# Patient Record
Sex: MALE | Race: WHITE | NOT HISPANIC OR LATINO | Employment: OTHER | URBAN - METROPOLITAN AREA
[De-identification: names, ages, dates, MRNs, and addresses within clinical notes are randomized per-mention and may not be internally consistent; named-entity substitution may affect disease eponyms.]

---

## 2023-03-06 LAB
ALANINE AMINOTRANSFERASE (SGPT) (U/L) IN SER/PLAS: 19 U/L (ref 10–52)
ALBUMIN (G/DL) IN SER/PLAS: 4.1 G/DL (ref 3.4–5)
ALKALINE PHOSPHATASE (U/L) IN SER/PLAS: 76 U/L (ref 33–136)
ANION GAP IN SER/PLAS: 12 MMOL/L (ref 10–20)
ASPARTATE AMINOTRANSFERASE (SGOT) (U/L) IN SER/PLAS: 19 U/L (ref 9–39)
BASOPHILS (10*3/UL) IN BLOOD BY AUTOMATED COUNT: 0.06 X10E9/L (ref 0–0.1)
BASOPHILS/100 LEUKOCYTES IN BLOOD BY AUTOMATED COUNT: 1.4 % (ref 0–2)
BILIRUBIN TOTAL (MG/DL) IN SER/PLAS: 0.8 MG/DL (ref 0–1.2)
CALCIUM (MG/DL) IN SER/PLAS: 9.4 MG/DL (ref 8.6–10.3)
CARBON DIOXIDE, TOTAL (MMOL/L) IN SER/PLAS: 27 MMOL/L (ref 21–32)
CHLORIDE (MMOL/L) IN SER/PLAS: 104 MMOL/L (ref 98–107)
CREATININE (MG/DL) IN SER/PLAS: 0.81 MG/DL (ref 0.5–1.3)
EOSINOPHILS (10*3/UL) IN BLOOD BY AUTOMATED COUNT: 0.28 X10E9/L (ref 0–0.7)
EOSINOPHILS/100 LEUKOCYTES IN BLOOD BY AUTOMATED COUNT: 6.7 % (ref 0–6)
ERYTHROCYTE DISTRIBUTION WIDTH (RATIO) BY AUTOMATED COUNT: 11.9 % (ref 11.5–14.5)
ERYTHROCYTE MEAN CORPUSCULAR HEMOGLOBIN CONCENTRATION (G/DL) BY AUTOMATED: 33.3 G/DL (ref 32–36)
ERYTHROCYTE MEAN CORPUSCULAR VOLUME (FL) BY AUTOMATED COUNT: 90 FL (ref 80–100)
ERYTHROCYTES (10*6/UL) IN BLOOD BY AUTOMATED COUNT: 4.71 X10E12/L (ref 4.5–5.9)
GFR MALE: >90 ML/MIN/1.73M2
GLUCOSE (MG/DL) IN SER/PLAS: 93 MG/DL (ref 74–99)
HEMATOCRIT (%) IN BLOOD BY AUTOMATED COUNT: 42.4 % (ref 41–52)
HEMOGLOBIN (G/DL) IN BLOOD: 14.1 G/DL (ref 13.5–17.5)
IMMATURE GRANULOCYTES/100 LEUKOCYTES IN BLOOD BY AUTOMATED COUNT: 0 % (ref 0–0.9)
LEUKOCYTES (10*3/UL) IN BLOOD BY AUTOMATED COUNT: 4.2 X10E9/L (ref 4.4–11.3)
LYMPHOCYTES (10*3/UL) IN BLOOD BY AUTOMATED COUNT: 0.87 X10E9/L (ref 1.2–4.8)
LYMPHOCYTES/100 LEUKOCYTES IN BLOOD BY AUTOMATED COUNT: 20.9 % (ref 13–44)
MONOCYTES (10*3/UL) IN BLOOD BY AUTOMATED COUNT: 0.5 X10E9/L (ref 0.1–1)
MONOCYTES/100 LEUKOCYTES IN BLOOD BY AUTOMATED COUNT: 12 % (ref 2–10)
NEUTROPHILS (10*3/UL) IN BLOOD BY AUTOMATED COUNT: 2.46 X10E9/L (ref 1.2–7.7)
NEUTROPHILS/100 LEUKOCYTES IN BLOOD BY AUTOMATED COUNT: 59 % (ref 40–80)
PLATELETS (10*3/UL) IN BLOOD AUTOMATED COUNT: 336 X10E9/L (ref 150–450)
POTASSIUM (MMOL/L) IN SER/PLAS: 3.2 MMOL/L (ref 3.5–5.3)
PROSTATE SPECIFIC AG (NG/ML) IN SER/PLAS: <0.01 NG/ML (ref 0–4)
PROTEIN TOTAL: 7.2 G/DL (ref 6.4–8.2)
SODIUM (MMOL/L) IN SER/PLAS: 140 MMOL/L (ref 136–145)
TESTOSTERONE (NG/DL) IN SER/PLAS: <60 NG/DL (ref 240–1000)
UREA NITROGEN (MG/DL) IN SER/PLAS: 15 MG/DL (ref 6–23)

## 2023-05-31 LAB
ALANINE AMINOTRANSFERASE (SGPT) (U/L) IN SER/PLAS: 20 U/L (ref 10–52)
ALBUMIN (G/DL) IN SER/PLAS: 4.4 G/DL (ref 3.4–5)
ALKALINE PHOSPHATASE (U/L) IN SER/PLAS: 86 U/L (ref 33–136)
ANION GAP IN SER/PLAS: 13 MMOL/L (ref 10–20)
ASPARTATE AMINOTRANSFERASE (SGOT) (U/L) IN SER/PLAS: 20 U/L (ref 9–39)
BASOPHILS (10*3/UL) IN BLOOD BY AUTOMATED COUNT: 0.11 X10E9/L (ref 0–0.1)
BASOPHILS/100 LEUKOCYTES IN BLOOD BY AUTOMATED COUNT: 2.1 % (ref 0–2)
BILIRUBIN TOTAL (MG/DL) IN SER/PLAS: 0.6 MG/DL (ref 0–1.2)
CALCIUM (MG/DL) IN SER/PLAS: 9.4 MG/DL (ref 8.6–10.3)
CARBON DIOXIDE, TOTAL (MMOL/L) IN SER/PLAS: 26 MMOL/L (ref 21–32)
CHLORIDE (MMOL/L) IN SER/PLAS: 106 MMOL/L (ref 98–107)
CREATININE (MG/DL) IN SER/PLAS: 0.74 MG/DL (ref 0.5–1.3)
EOSINOPHILS (10*3/UL) IN BLOOD BY AUTOMATED COUNT: 0.42 X10E9/L (ref 0–0.7)
EOSINOPHILS/100 LEUKOCYTES IN BLOOD BY AUTOMATED COUNT: 7.9 % (ref 0–6)
ERYTHROCYTE DISTRIBUTION WIDTH (RATIO) BY AUTOMATED COUNT: 12.3 % (ref 11.5–14.5)
ERYTHROCYTE MEAN CORPUSCULAR HEMOGLOBIN CONCENTRATION (G/DL) BY AUTOMATED: 33.4 G/DL (ref 32–36)
ERYTHROCYTE MEAN CORPUSCULAR VOLUME (FL) BY AUTOMATED COUNT: 90 FL (ref 80–100)
ERYTHROCYTES (10*6/UL) IN BLOOD BY AUTOMATED COUNT: 5 X10E12/L (ref 4.5–5.9)
GFR MALE: >90 ML/MIN/1.73M2
GLUCOSE (MG/DL) IN SER/PLAS: 99 MG/DL (ref 74–99)
HEMATOCRIT (%) IN BLOOD BY AUTOMATED COUNT: 44.9 % (ref 41–52)
HEMOGLOBIN (G/DL) IN BLOOD: 15 G/DL (ref 13.5–17.5)
IMMATURE GRANULOCYTES/100 LEUKOCYTES IN BLOOD BY AUTOMATED COUNT: 0.2 % (ref 0–0.9)
LEUKOCYTES (10*3/UL) IN BLOOD BY AUTOMATED COUNT: 5.3 X10E9/L (ref 4.4–11.3)
LYMPHOCYTES (10*3/UL) IN BLOOD BY AUTOMATED COUNT: 0.92 X10E9/L (ref 1.2–4.8)
LYMPHOCYTES/100 LEUKOCYTES IN BLOOD BY AUTOMATED COUNT: 17.4 % (ref 13–44)
MONOCYTES (10*3/UL) IN BLOOD BY AUTOMATED COUNT: 0.6 X10E9/L (ref 0.1–1)
MONOCYTES/100 LEUKOCYTES IN BLOOD BY AUTOMATED COUNT: 11.3 % (ref 2–10)
NEUTROPHILS (10*3/UL) IN BLOOD BY AUTOMATED COUNT: 3.24 X10E9/L (ref 1.2–7.7)
NEUTROPHILS/100 LEUKOCYTES IN BLOOD BY AUTOMATED COUNT: 61.1 % (ref 40–80)
PLATELETS (10*3/UL) IN BLOOD AUTOMATED COUNT: 349 X10E9/L (ref 150–450)
POTASSIUM (MMOL/L) IN SER/PLAS: 3.6 MMOL/L (ref 3.5–5.3)
PROSTATE SPECIFIC AG (NG/ML) IN SER/PLAS: <0.01 NG/ML (ref 0–4)
PROTEIN TOTAL: 7.8 G/DL (ref 6.4–8.2)
SODIUM (MMOL/L) IN SER/PLAS: 141 MMOL/L (ref 136–145)
TESTOSTERONE (NG/DL) IN SER/PLAS: <60 NG/DL (ref 240–1000)
UREA NITROGEN (MG/DL) IN SER/PLAS: 18 MG/DL (ref 6–23)

## 2023-07-20 DIAGNOSIS — I10 PRIMARY HYPERTENSION: Primary | ICD-10-CM

## 2023-07-20 RX ORDER — ABIRATERONE ACETATE 250 MG/1
4 TABLET ORAL DAILY
COMMUNITY
Start: 2022-10-11 | End: 2023-11-30 | Stop reason: ALTCHOICE

## 2023-07-20 RX ORDER — LOSARTAN POTASSIUM 50 MG/1
50 TABLET ORAL 2 TIMES DAILY
Qty: 180 TABLET | Refills: 3 | Status: SHIPPED | OUTPATIENT
Start: 2023-07-20 | End: 2023-07-20

## 2023-07-20 RX ORDER — PREDNISONE 5 MG/1
TABLET ORAL
COMMUNITY
Start: 2022-10-11 | End: 2023-11-13 | Stop reason: WASHOUT

## 2023-07-20 RX ORDER — AMLODIPINE BESYLATE 5 MG/1
2 TABLET ORAL DAILY
COMMUNITY
Start: 2022-11-23 | End: 2023-11-30 | Stop reason: ALTCHOICE

## 2023-07-20 RX ORDER — LOSARTAN POTASSIUM 50 MG/1
50 TABLET ORAL 2 TIMES DAILY
COMMUNITY
End: 2023-07-20 | Stop reason: SDUPTHER

## 2023-08-24 LAB
ALANINE AMINOTRANSFERASE (SGPT) (U/L) IN SER/PLAS: 14 U/L (ref 10–52)
ALBUMIN (G/DL) IN SER/PLAS: 4.3 G/DL (ref 3.4–5)
ALKALINE PHOSPHATASE (U/L) IN SER/PLAS: 91 U/L (ref 33–136)
ANION GAP IN SER/PLAS: 12 MMOL/L (ref 10–20)
ASPARTATE AMINOTRANSFERASE (SGOT) (U/L) IN SER/PLAS: 16 U/L (ref 9–39)
BASOPHILS (10*3/UL) IN BLOOD BY AUTOMATED COUNT: 0.08 X10E9/L (ref 0–0.1)
BASOPHILS/100 LEUKOCYTES IN BLOOD BY AUTOMATED COUNT: 1.4 % (ref 0–2)
BILIRUBIN TOTAL (MG/DL) IN SER/PLAS: 0.4 MG/DL (ref 0–1.2)
CALCIUM (MG/DL) IN SER/PLAS: 9.7 MG/DL (ref 8.6–10.3)
CARBON DIOXIDE, TOTAL (MMOL/L) IN SER/PLAS: 26 MMOL/L (ref 21–32)
CHLORIDE (MMOL/L) IN SER/PLAS: 105 MMOL/L (ref 98–107)
CREATININE (MG/DL) IN SER/PLAS: 0.78 MG/DL (ref 0.5–1.3)
EOSINOPHILS (10*3/UL) IN BLOOD BY AUTOMATED COUNT: 0.6 X10E9/L (ref 0–0.7)
EOSINOPHILS/100 LEUKOCYTES IN BLOOD BY AUTOMATED COUNT: 10.7 % (ref 0–6)
ERYTHROCYTE DISTRIBUTION WIDTH (RATIO) BY AUTOMATED COUNT: 12.3 % (ref 11.5–14.5)
ERYTHROCYTE MEAN CORPUSCULAR HEMOGLOBIN CONCENTRATION (G/DL) BY AUTOMATED: 33.6 G/DL (ref 32–36)
ERYTHROCYTE MEAN CORPUSCULAR VOLUME (FL) BY AUTOMATED COUNT: 90 FL (ref 80–100)
ERYTHROCYTES (10*6/UL) IN BLOOD BY AUTOMATED COUNT: 4.76 X10E12/L (ref 4.5–5.9)
GFR MALE: >90 ML/MIN/1.73M2
GLUCOSE (MG/DL) IN SER/PLAS: 104 MG/DL (ref 74–99)
HEMATOCRIT (%) IN BLOOD BY AUTOMATED COUNT: 42.8 % (ref 41–52)
HEMOGLOBIN (G/DL) IN BLOOD: 14.4 G/DL (ref 13.5–17.5)
IMMATURE GRANULOCYTES/100 LEUKOCYTES IN BLOOD BY AUTOMATED COUNT: 0.2 % (ref 0–0.9)
LEUKOCYTES (10*3/UL) IN BLOOD BY AUTOMATED COUNT: 5.6 X10E9/L (ref 4.4–11.3)
LYMPHOCYTES (10*3/UL) IN BLOOD BY AUTOMATED COUNT: 1.05 X10E9/L (ref 1.2–4.8)
LYMPHOCYTES/100 LEUKOCYTES IN BLOOD BY AUTOMATED COUNT: 18.7 % (ref 13–44)
MONOCYTES (10*3/UL) IN BLOOD BY AUTOMATED COUNT: 0.49 X10E9/L (ref 0.1–1)
MONOCYTES/100 LEUKOCYTES IN BLOOD BY AUTOMATED COUNT: 8.7 % (ref 2–10)
NEUTROPHILS (10*3/UL) IN BLOOD BY AUTOMATED COUNT: 3.39 X10E9/L (ref 1.2–7.7)
NEUTROPHILS/100 LEUKOCYTES IN BLOOD BY AUTOMATED COUNT: 60.3 % (ref 40–80)
PLATELETS (10*3/UL) IN BLOOD AUTOMATED COUNT: 306 X10E9/L (ref 150–450)
POTASSIUM (MMOL/L) IN SER/PLAS: 3.8 MMOL/L (ref 3.5–5.3)
PROSTATE SPECIFIC AG (NG/ML) IN SER/PLAS: <0.1 NG/ML (ref 0–4)
PROTEIN TOTAL: 7.3 G/DL (ref 6.4–8.2)
SODIUM (MMOL/L) IN SER/PLAS: 139 MMOL/L (ref 136–145)
TESTOSTERONE (NG/DL) IN SER/PLAS: <60 NG/DL (ref 240–1000)
UREA NITROGEN (MG/DL) IN SER/PLAS: 18 MG/DL (ref 6–23)

## 2023-10-10 ENCOUNTER — SPECIALTY PHARMACY (OUTPATIENT)
Dept: PHARMACY | Facility: CLINIC | Age: 63
End: 2023-10-10

## 2023-10-10 ENCOUNTER — PHARMACY VISIT (OUTPATIENT)
Dept: PHARMACY | Facility: CLINIC | Age: 63
End: 2023-10-10
Payer: COMMERCIAL

## 2023-10-10 PROCEDURE — RXMED WILLOW AMBULATORY MEDICATION CHARGE

## 2023-10-19 ENCOUNTER — PHARMACY VISIT (OUTPATIENT)
Dept: PHARMACY | Facility: CLINIC | Age: 63
End: 2023-10-19
Payer: COMMERCIAL

## 2023-10-19 PROCEDURE — RXMED WILLOW AMBULATORY MEDICATION CHARGE

## 2023-10-30 PROBLEM — N40.0 BPH WITHOUT URINARY OBSTRUCTION: Status: ACTIVE | Noted: 2023-10-30

## 2023-10-30 PROBLEM — M25.551 BILATERAL HIP PAIN: Status: ACTIVE | Noted: 2023-10-30

## 2023-10-30 PROBLEM — G56.20 CUBITAL TUNNEL SYNDROME: Status: ACTIVE | Noted: 2023-10-30

## 2023-10-30 PROBLEM — N52.9 ERECTILE DYSFUNCTION: Status: ACTIVE | Noted: 2023-10-30

## 2023-10-30 PROBLEM — A60.00 HERPES, GENITAL: Status: ACTIVE | Noted: 2023-10-30

## 2023-10-30 PROBLEM — C61 PROSTATE CA (MULTI): Status: ACTIVE | Noted: 2023-10-30

## 2023-10-30 PROBLEM — R68.82 LOW LIBIDO: Status: ACTIVE | Noted: 2023-10-30

## 2023-10-30 PROBLEM — R35.1 NOCTURIA: Status: ACTIVE | Noted: 2023-10-30

## 2023-10-30 PROBLEM — G56.03 CARPAL TUNNEL SYNDROME, BILATERAL: Status: ACTIVE | Noted: 2023-10-30

## 2023-10-30 PROBLEM — L98.9 SKIN LESION: Status: ACTIVE | Noted: 2023-10-30

## 2023-10-30 PROBLEM — N50.89 SWELLING OF SCROTUM PRESENT ON EXAMINATION: Status: ACTIVE | Noted: 2023-10-30

## 2023-10-30 PROBLEM — I10 HTN (HYPERTENSION): Status: ACTIVE | Noted: 2023-10-30

## 2023-10-30 PROBLEM — M25.552 BILATERAL HIP PAIN: Status: ACTIVE | Noted: 2023-10-30

## 2023-10-30 PROBLEM — M19.012 GLENOHUMERAL ARTHRITIS, LEFT: Status: ACTIVE | Noted: 2023-10-30

## 2023-10-30 PROBLEM — E29.1 HYPOGONADISM IN MALE: Status: ACTIVE | Noted: 2023-10-30

## 2023-10-30 PROBLEM — R30.0 DYSURIA: Status: ACTIVE | Noted: 2023-10-30

## 2023-11-09 ENCOUNTER — APPOINTMENT (OUTPATIENT)
Dept: HEMATOLOGY/ONCOLOGY | Facility: CLINIC | Age: 63
End: 2023-11-09
Payer: COMMERCIAL

## 2023-11-13 ENCOUNTER — PHARMACY VISIT (OUTPATIENT)
Dept: PHARMACY | Facility: CLINIC | Age: 63
End: 2023-11-13
Payer: COMMERCIAL

## 2023-11-13 ENCOUNTER — SPECIALTY PHARMACY (OUTPATIENT)
Dept: PHARMACY | Facility: CLINIC | Age: 63
End: 2023-11-13

## 2023-11-13 DIAGNOSIS — C61 PROSTATE CA (MULTI): Primary | ICD-10-CM

## 2023-11-13 PROCEDURE — RXMED WILLOW AMBULATORY MEDICATION CHARGE

## 2023-11-13 RX ORDER — PREDNISONE 5 MG/1
TABLET ORAL
Qty: 30 TABLET | Refills: 11 | Status: SHIPPED | OUTPATIENT
Start: 2023-11-13 | End: 2024-11-11

## 2023-11-14 ENCOUNTER — PHARMACY VISIT (OUTPATIENT)
Dept: PHARMACY | Facility: CLINIC | Age: 63
End: 2023-11-14
Payer: COMMERCIAL

## 2023-11-14 PROCEDURE — RXMED WILLOW AMBULATORY MEDICATION CHARGE

## 2023-11-27 ENCOUNTER — PHARMACY VISIT (OUTPATIENT)
Dept: PHARMACY | Facility: CLINIC | Age: 63
End: 2023-11-27
Payer: COMMERCIAL

## 2023-11-27 ENCOUNTER — LAB (OUTPATIENT)
Dept: LAB | Facility: LAB | Age: 63
End: 2023-11-27
Payer: COMMERCIAL

## 2023-11-27 DIAGNOSIS — C61 MALIGNANT NEOPLASM OF PROSTATE (MULTI): Primary | ICD-10-CM

## 2023-11-27 LAB
ALBUMIN SERPL BCP-MCNC: 4.3 G/DL (ref 3.4–5)
ALP SERPL-CCNC: 89 U/L (ref 33–136)
ALT SERPL W P-5'-P-CCNC: 17 U/L (ref 10–52)
ANION GAP SERPL CALC-SCNC: 12 MMOL/L (ref 10–20)
AST SERPL W P-5'-P-CCNC: 17 U/L (ref 9–39)
BASOPHILS # BLD AUTO: 0.1 X10*3/UL (ref 0–0.1)
BASOPHILS NFR BLD AUTO: 1.7 %
BILIRUB SERPL-MCNC: 0.7 MG/DL (ref 0–1.2)
BUN SERPL-MCNC: 16 MG/DL (ref 6–23)
CALCIUM SERPL-MCNC: 9.4 MG/DL (ref 8.6–10.3)
CHLORIDE SERPL-SCNC: 103 MMOL/L (ref 98–107)
CO2 SERPL-SCNC: 28 MMOL/L (ref 21–32)
CREAT SERPL-MCNC: 0.75 MG/DL (ref 0.5–1.3)
EOSINOPHIL # BLD AUTO: 0.46 X10*3/UL (ref 0–0.7)
EOSINOPHIL NFR BLD AUTO: 7.9 %
ERYTHROCYTE [DISTWIDTH] IN BLOOD BY AUTOMATED COUNT: 12.3 % (ref 11.5–14.5)
GFR SERPL CREATININE-BSD FRML MDRD: >90 ML/MIN/1.73M*2
GLUCOSE SERPL-MCNC: 87 MG/DL (ref 74–99)
HCT VFR BLD AUTO: 43.7 % (ref 41–52)
HGB BLD-MCNC: 14.7 G/DL (ref 13.5–17.5)
IMM GRANULOCYTES # BLD AUTO: 0.01 X10*3/UL (ref 0–0.7)
IMM GRANULOCYTES NFR BLD AUTO: 0.2 % (ref 0–0.9)
LYMPHOCYTES # BLD AUTO: 1.14 X10*3/UL (ref 1.2–4.8)
LYMPHOCYTES NFR BLD AUTO: 19.7 %
MCH RBC QN AUTO: 30.6 PG (ref 26–34)
MCHC RBC AUTO-ENTMCNC: 33.6 G/DL (ref 32–36)
MCV RBC AUTO: 91 FL (ref 80–100)
MONOCYTES # BLD AUTO: 0.66 X10*3/UL (ref 0.1–1)
MONOCYTES NFR BLD AUTO: 11.4 %
NEUTROPHILS # BLD AUTO: 3.42 X10*3/UL (ref 1.2–7.7)
NEUTROPHILS NFR BLD AUTO: 59.1 %
NRBC BLD-RTO: 0 /100 WBCS (ref 0–0)
PLATELET # BLD AUTO: 346 X10*3/UL (ref 150–450)
POTASSIUM SERPL-SCNC: 4 MMOL/L (ref 3.5–5.3)
PROT SERPL-MCNC: 7.2 G/DL (ref 6.4–8.2)
PSA SERPL-MCNC: <0.01 NG/ML
RBC # BLD AUTO: 4.81 X10*6/UL (ref 4.5–5.9)
SODIUM SERPL-SCNC: 139 MMOL/L (ref 136–145)
TESTOST SERPL-MCNC: <30 NG/DL (ref 240–1000)
WBC # BLD AUTO: 5.8 X10*3/UL (ref 4.4–11.3)

## 2023-11-27 PROCEDURE — 80053 COMPREHEN METABOLIC PANEL: CPT

## 2023-11-27 PROCEDURE — 84403 ASSAY OF TOTAL TESTOSTERONE: CPT

## 2023-11-27 PROCEDURE — 36415 COLL VENOUS BLD VENIPUNCTURE: CPT

## 2023-11-27 PROCEDURE — RXMED WILLOW AMBULATORY MEDICATION CHARGE

## 2023-11-27 PROCEDURE — 84153 ASSAY OF PSA TOTAL: CPT

## 2023-11-27 PROCEDURE — 85025 COMPLETE CBC W/AUTO DIFF WBC: CPT

## 2023-11-30 ENCOUNTER — OFFICE VISIT (OUTPATIENT)
Dept: HEMATOLOGY/ONCOLOGY | Facility: CLINIC | Age: 63
End: 2023-11-30
Payer: COMMERCIAL

## 2023-11-30 ENCOUNTER — INFUSION (OUTPATIENT)
Dept: HEMATOLOGY/ONCOLOGY | Facility: CLINIC | Age: 63
End: 2023-11-30
Payer: COMMERCIAL

## 2023-11-30 ENCOUNTER — PHARMACY VISIT (OUTPATIENT)
Dept: PHARMACY | Facility: CLINIC | Age: 63
End: 2023-11-30
Payer: COMMERCIAL

## 2023-11-30 ENCOUNTER — APPOINTMENT (OUTPATIENT)
Dept: HEMATOLOGY/ONCOLOGY | Facility: CLINIC | Age: 63
End: 2023-11-30
Payer: COMMERCIAL

## 2023-11-30 VITALS
DIASTOLIC BLOOD PRESSURE: 70 MMHG | TEMPERATURE: 96.4 F | RESPIRATION RATE: 16 BRPM | BODY MASS INDEX: 32.1 KG/M2 | HEART RATE: 80 BPM | HEIGHT: 70 IN | SYSTOLIC BLOOD PRESSURE: 98 MMHG | WEIGHT: 224.21 LBS | OXYGEN SATURATION: 95 %

## 2023-11-30 DIAGNOSIS — C61 PROSTATE CA (MULTI): ICD-10-CM

## 2023-11-30 DIAGNOSIS — C61 PROSTATE CA (MULTI): Primary | ICD-10-CM

## 2023-11-30 PROCEDURE — 3078F DIAST BP <80 MM HG: CPT | Performed by: NURSE PRACTITIONER

## 2023-11-30 PROCEDURE — 96402 CHEMO HORMON ANTINEOPL SQ/IM: CPT

## 2023-11-30 PROCEDURE — 99215 OFFICE O/P EST HI 40 MIN: CPT | Performed by: NURSE PRACTITIONER

## 2023-11-30 PROCEDURE — RXMED WILLOW AMBULATORY MEDICATION CHARGE

## 2023-11-30 PROCEDURE — 3074F SYST BP LT 130 MM HG: CPT | Performed by: NURSE PRACTITIONER

## 2023-11-30 PROCEDURE — 2500000004 HC RX 250 GENERAL PHARMACY W/ HCPCS (ALT 636 FOR OP/ED): Mod: JZ | Performed by: NURSE PRACTITIONER

## 2023-11-30 RX ORDER — OXYBUTYNIN CHLORIDE 5 MG/1
5 TABLET ORAL 2 TIMES DAILY
Qty: 60 TABLET | Refills: 2 | Status: SHIPPED | OUTPATIENT
Start: 2023-11-30 | End: 2024-04-02 | Stop reason: SDUPTHER

## 2023-11-30 RX ORDER — EPINEPHRINE 0.3 MG/.3ML
0.3 INJECTION SUBCUTANEOUS EVERY 5 MIN PRN
Status: CANCELLED | OUTPATIENT
Start: 2023-11-30

## 2023-11-30 RX ORDER — DIPHENHYDRAMINE HYDROCHLORIDE 50 MG/ML
50 INJECTION INTRAMUSCULAR; INTRAVENOUS AS NEEDED
Status: CANCELLED | OUTPATIENT
Start: 2023-11-30

## 2023-11-30 RX ORDER — FAMOTIDINE 10 MG/ML
20 INJECTION INTRAVENOUS ONCE AS NEEDED
Status: CANCELLED | OUTPATIENT
Start: 2023-11-30

## 2023-11-30 RX ORDER — ALBUTEROL SULFATE 0.83 MG/ML
3 SOLUTION RESPIRATORY (INHALATION) AS NEEDED
Status: CANCELLED | OUTPATIENT
Start: 2024-02-22

## 2023-11-30 RX ORDER — FAMOTIDINE 10 MG/ML
20 INJECTION INTRAVENOUS ONCE AS NEEDED
Status: CANCELLED | OUTPATIENT
Start: 2024-02-22

## 2023-11-30 RX ORDER — ALBUTEROL SULFATE 0.83 MG/ML
3 SOLUTION RESPIRATORY (INHALATION) AS NEEDED
Status: CANCELLED | OUTPATIENT
Start: 2023-11-30

## 2023-11-30 RX ORDER — DIPHENHYDRAMINE HYDROCHLORIDE 50 MG/ML
50 INJECTION INTRAMUSCULAR; INTRAVENOUS AS NEEDED
Status: CANCELLED | OUTPATIENT
Start: 2024-02-22

## 2023-11-30 RX ORDER — EPINEPHRINE 0.3 MG/.3ML
0.3 INJECTION SUBCUTANEOUS EVERY 5 MIN PRN
Status: CANCELLED | OUTPATIENT
Start: 2024-02-22

## 2023-11-30 RX ADMIN — TRIPTORELIN PAMOATE 11.25 MG: KIT at 16:24

## 2023-11-30 ASSESSMENT — PAIN SCALES - GENERAL: PAINLEVEL: 0-NO PAIN

## 2023-11-30 ASSESSMENT — ENCOUNTER SYMPTOMS
LOSS OF SENSATION IN FEET: 0
DEPRESSION: 0
OCCASIONAL FEELINGS OF UNSTEADINESS: 0

## 2023-11-30 NOTE — PROGRESS NOTES
Pt seen and assessed by JULIETA Burkett NP.  Pt had no further symptoms, questions, or concerns during RN assessment.

## 2023-11-30 NOTE — PROGRESS NOTES
Patient ID: Arvind Cabrera is a 63 y.o. male.  Attending Physician: Dr. Lenard Ware  Cancer Diagnosis: Cancer Staging   No matching staging information was found for the patient.   Current Therapy: ADT (trelstar q12 weeks)  Abiraterone Acetate 750 mg PO daily  Prednisone 5 mg PO daily    Genetics:   xT  TMPRSS2 - ERG fusion  HRR not detected  MIS-S  TMB 0.0    Subjective      Cancer History:  Oncology History    No history exists.     9/2021  PSA 3.2     10/13/21  Prostate biopsy - Suzette grade group 2, involving 35% of the specimen     12/20/21  Prostatectomy - Suzette grade group 2, tertiary pattern 5, 0/6 lymph nodes positive, staged as pT3aN0, +EPE, +PNI, -SVI, - LVI. Tumor microns from lateral margin     2/1/22  PSA myles at 0.10     8/5/22  PSA 0.18     8/19/22  PSMA PET shows lesion at T11     8/26/22 MR spine shows enhancement at T9 and T11     9/9/22   PSA 0.24     9/15/22  Start ADT     10/6/22  Start abiraterone and prednisone     11/2/22 PSA 0.13, completed radiation with Dr. Nunez     12/7/22 PSA 0.03     3/6/23 PSA undetectable. C/o worsening back pain, MRI reveals treated lesions and unchanged area of edema     5/31/23  PSA undetectable     8/24/23  Abiraterone dose decreased to 750 mg    Interval History:  Mr. Cabrera feels much better from a fatigue standpoint since dose-reducing luan to 750. His hot flashes had improved about 50% when he started acupuncture, but he has noticed a somewhat rapid return after not going for some time. He has some pruritus in his low back occasionally without notable rash. His ankles do swell intermittently, as well as his hands. This has been for some time, and is equal bilaterally. Is taking 10 mg daily amlodipine in conjunction with other BP meds. Hasn't been checking BP at home, but doesn't have any dizziness or s/s of low BP. He follows with PCP for this. Otherwise, no new or concerning symptoms. The remainder of his ROS is otherwise negative.    HPI    Objective     BSA: There is no height or weight on file to calculate BSA.  There were no vitals taken for this visit.    Physical Exam  General: alert, well-dressed in NAD. Speech is fluent and coherent, words clear. Good insight. Oriented x4   Skin: warm, dry, and pink without cyanosis or nail clubbing. No rash, petechiae, or ecchymoses  HEENT: Normocephalic atraumatic. Sclera white, conjunctiva pink. EOMs intact. Hearing intact to spoken voice. Oral mucosa pink. No visible lesions  Respiratory: Chest expansion symmetric. Breath sounds vesicular in all lobes without crackles, wheezes, or rhonchi bilaterally.  CV: S1S2 audible and crisp without murmurs, rubs, or extra sounds. RRR. Radial pulses strong and regular. Extremities warm and pink. No edema bilaterally.  Lymph: no palpable cervical, submandibular, or supraclavicular lymphadenopathy.  GI: abdomen is round, soft, and non-tender. Active bowel sounds. No palpable masses or hepatosplenomegaly.   Psych: engaged, polite, appropriate conversation and eye contact.    Current Medications:    Current Outpatient Medications:     abiraterone (Zytiga) 250 mg tablet, Take 4 tablets (1,000 mg total) by mouth once daily., Disp: , Rfl:     abiraterone (Zytiga) 250 mg tablet, TAKE THREE (3) TABLETS BY MOUTH ONCE DAILY., Disp: 90 tablet, Rfl: 11    amLODIPine (Norvasc) 5 mg tablet, Take 2 tablets (10 mg) by mouth once daily., Disp: , Rfl:     amLODIPine (Norvasc) 5 mg tablet, TAKE 2 TABLETS DAILY., Disp: 180 tablet, Rfl: 1    amLODIPine (Norvasc) 5 mg tablet, TAKE 1 TABLET DAILY., Disp: 90 tablet, Rfl: 3    losartan (Cozaar) 50 mg tablet, TAKE 1 TABLET BY MOUTH 2 TIMES A DAY, Disp: 180 tablet, Rfl: 3    predniSONE (Deltasone) 5 mg tablet, TAKE ONE (1) TABLET BY MOUTH ONCE A DAY, Disp: 30 tablet, Rfl: 11     Most Recent Labs:  Results for orders placed or performed in visit on 11/27/23   Comprehensive Metabolic Panel   Result Value Ref Range    Glucose 87 74 - 99 mg/dL    Sodium 139 136 -  145 mmol/L    Potassium 4.0 3.5 - 5.3 mmol/L    Chloride 103 98 - 107 mmol/L    Bicarbonate 28 21 - 32 mmol/L    Anion Gap 12 10 - 20 mmol/L    Urea Nitrogen 16 6 - 23 mg/dL    Creatinine 0.75 0.50 - 1.30 mg/dL    eGFR >90 >60 mL/min/1.73m*2    Calcium 9.4 8.6 - 10.3 mg/dL    Albumin 4.3 3.4 - 5.0 g/dL    Alkaline Phosphatase 89 33 - 136 U/L    Total Protein 7.2 6.4 - 8.2 g/dL    AST 17 9 - 39 U/L    Bilirubin, Total 0.7 0.0 - 1.2 mg/dL    ALT 17 10 - 52 U/L   CBC and Auto Differential   Result Value Ref Range    WBC 5.8 4.4 - 11.3 x10*3/uL    nRBC 0.0 0.0 - 0.0 /100 WBCs    RBC 4.81 4.50 - 5.90 x10*6/uL    Hemoglobin 14.7 13.5 - 17.5 g/dL    Hematocrit 43.7 41.0 - 52.0 %    MCV 91 80 - 100 fL    MCH 30.6 26.0 - 34.0 pg    MCHC 33.6 32.0 - 36.0 g/dL    RDW 12.3 11.5 - 14.5 %    Platelets 346 150 - 450 x10*3/uL    Neutrophils % 59.1 40.0 - 80.0 %    Immature Granulocytes %, Automated 0.2 0.0 - 0.9 %    Lymphocytes % 19.7 13.0 - 44.0 %    Monocytes % 11.4 2.0 - 10.0 %    Eosinophils % 7.9 0.0 - 6.0 %    Basophils % 1.7 0.0 - 2.0 %    Neutrophils Absolute 3.42 1.20 - 7.70 x10*3/uL    Immature Granulocytes Absolute, Automated 0.01 0.00 - 0.70 x10*3/uL    Lymphocytes Absolute 1.14 (L) 1.20 - 4.80 x10*3/uL    Monocytes Absolute 0.66 0.10 - 1.00 x10*3/uL    Eosinophils Absolute 0.46 0.00 - 0.70 x10*3/uL    Basophils Absolute 0.10 0.00 - 0.10 x10*3/uL   Prostate Specific Antigen   Result Value Ref Range    Prostate Specific AG <0.01 <=4.00 ng/mL   Testosterone   Result Value Ref Range    Testosterone <30 (L) 240 - 1,000 ng/dL      Lab Results   Component Value Date    PSA <0.01 11/27/2023    PSA <0.10 08/24/2023    PSA <0.01 05/31/2023        Performance Status:  ECOG Score: 0- Fully active, able to carry on all pre-disease performance w/o restriction.  Karnofsky Score: 90 - Able to carry on normal activity; minor signs or symptoms of disease       Assessment/Plan   Arvind Cabrera is a 63 y.o. male with oligometastatic  prostate cancer who presents in follow up on ADT and AA/P (dose reduced).    He was diagnosed with [id number] GG2 prostate adenocarcinoma in 2021 and underwent prostatectomy. At the time, he had some high risk features including  EPE and close margins. In August 2022, he was found to have residual PSA and underwent PSMA PET imaging, which revealed a lesion at T11. He had no other sites of disease on imaging. He completed radiation to T11 and did not receive pelvic XRT.     Tolerating well, see below for AE management. He will continue treatment and plans to complete a two year course.    # Oligometastatic Prostate Cancer  - Continue ADT every 3 months, due today, next due February 2024  - Continue abiraterone at reduced dose of 750 mg PO, prednisone 5 mg PO daily  - No actionable mutations on NGS  - S/p radiation to T11  - Plan is for 2 years of therapy    # Pruritus on low back without rash  - OK for hydrocortisone    # Back pain  - Continue with ortho     # Fatigue, much improved  - Continue activity  - Dose reduction of abiraterone      # Hot flashes, did not tolerate venlafaxine  - Continue with acupuncture   - Start oxybutynin 5 mg PO BID     # Bone Health  - Continue calcium and vitamin D, at least 1000 units each daily  - Continue regular weight bearing physical activity     # HTN, hypotension today without symptoms  # Ankle edema  - Closely monitor BP  - Continue to follow with cardiology  - May need dose reduction of amlodipine  - Monitor when forgetting prednisone dose for worsening/improved swelling if correlation     # Urinary Incontinence  - Follows with urology (Dr. Alberts)    RTC 12 weeks with labs for next injection    Total time spent on this encounter was 50 minutes, which included preparation, direct time with patient, documentation, and care coordination on the day of visit.    Janel Burkett, MSN, APRN, AGNP-C, AOCNP  Associate Nurse Practitioner  [facility] [place], [facility]

## 2023-12-13 ENCOUNTER — SPECIALTY PHARMACY (OUTPATIENT)
Dept: PHARMACY | Facility: CLINIC | Age: 63
End: 2023-12-13

## 2023-12-13 ENCOUNTER — PHARMACY VISIT (OUTPATIENT)
Dept: PHARMACY | Facility: CLINIC | Age: 63
End: 2023-12-13
Payer: COMMERCIAL

## 2023-12-13 PROCEDURE — RXMED WILLOW AMBULATORY MEDICATION CHARGE

## 2024-01-04 ENCOUNTER — OFFICE VISIT (OUTPATIENT)
Dept: PRIMARY CARE | Facility: CLINIC | Age: 64
End: 2024-01-04
Payer: COMMERCIAL

## 2024-01-04 VITALS
SYSTOLIC BLOOD PRESSURE: 118 MMHG | TEMPERATURE: 98.9 F | HEART RATE: 71 BPM | WEIGHT: 224.4 LBS | DIASTOLIC BLOOD PRESSURE: 82 MMHG | HEIGHT: 72 IN | BODY MASS INDEX: 30.4 KG/M2

## 2024-01-04 DIAGNOSIS — Z00.00 HEALTHCARE MAINTENANCE: ICD-10-CM

## 2024-01-04 DIAGNOSIS — E29.1 HYPOGONADISM IN MALE: ICD-10-CM

## 2024-01-04 DIAGNOSIS — I10 PRIMARY HYPERTENSION: Primary | ICD-10-CM

## 2024-01-04 DIAGNOSIS — C61 PROSTATE CA (MULTI): ICD-10-CM

## 2024-01-04 PROCEDURE — 99213 OFFICE O/P EST LOW 20 MIN: CPT | Performed by: PHYSICIAN ASSISTANT

## 2024-01-04 PROCEDURE — 3074F SYST BP LT 130 MM HG: CPT | Performed by: PHYSICIAN ASSISTANT

## 2024-01-04 PROCEDURE — 3079F DIAST BP 80-89 MM HG: CPT | Performed by: PHYSICIAN ASSISTANT

## 2024-01-04 PROCEDURE — 1036F TOBACCO NON-USER: CPT | Performed by: PHYSICIAN ASSISTANT

## 2024-01-04 ASSESSMENT — PATIENT HEALTH QUESTIONNAIRE - PHQ9
SUM OF ALL RESPONSES TO PHQ9 QUESTIONS 1 AND 2: 0
1. LITTLE INTEREST OR PLEASURE IN DOING THINGS: NOT AT ALL
2. FEELING DOWN, DEPRESSED OR HOPELESS: NOT AT ALL

## 2024-01-04 NOTE — PROGRESS NOTES
Subjective   Patient ID: Arvind Cabrera is a 63 y.o. male who presents for Annual Exam (FU 1 year, patient offers no complaints./Colonoscopy done 7-17-23 and PSA done 11-27-23.).  HPI  Patient presents for annual follow-up.    Patient continues to follow with oncology regarding treatment for prostate cancer.  Patient was having hot flashes at for which oxybutynin was written and effective.  Patient is taking losartan for hypertension.    Patient reports chronic hip pain tends to wax and wane.  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    Patient also reports chronic neuropathy in the feet.  Patient has seen podiatry for this and was prescribed gabapentin.  Patient did not tolerate the medication.    Patient has some pending lab orders from oncology.  No acute complaints.    Review of Systems    Constitutional:  See HPI    Neurologic:  Alert and oriented X4, No numbness, No tingling.    All other systems are negative     Objective     /82   Pulse 71   Temp 37.2 °C (98.9 °F) (Temporal)   Ht 1.829 m (6')   Wt 102 kg (224 lb 6.4 oz)   BMI 30.43 kg/m²     Physical Exam  General:  Alert and oriented, No acute distress.    Eye:  Pupils are equal, round and reactive to light, Extraocular movements are intact, Normal conjunctiva.    HENT:  Normocephalic, Normal hearing, Oral mucosa is moist, No pharyngeal erythema, No sinus tenderness.    Neck:  Supple, Non-tender, No lymphadenopathy.    Respiratory:  Lungs are clear to auscultation, Respirations are non-labored, Breath sounds are equal    Cardiovascular:  Normal rate, Regular rhythm.    Gastrointestinal:  Non-distended.    Musculoskeletal: Normal range of motion, Normal strength, No tenderness, No swelling, No deformity, Normal gait.     Integumentary:  Warm, Dry, Intact, No pallor, No rash.    Neurologic:  Alert, Oriented, Normal sensory, Normal motor function, No focal deficits, Cranial Nerves  II-XII are grossly intact   Psychiatric:  Cooperative, Appropriate mood & affect.    Assessment/Plan   Hypertension: Continue losartan.  Lipid panel and TSH added to the previous lab orders    Prostate cancer: Continue with oncology and treatment as prescribed    Hypogonadism: Testosterone has been ordered by oncology.    Follow-up in 1 year or as needed.  Problem List Items Addressed This Visit       HTN (hypertension) - Primary    Relevant Orders    Lipid Panel    TSH    Hypogonadism in male    Relevant Orders    TSH    Prostate CA (CMS/HCC)    Relevant Orders    TSH     Other Visit Diagnoses       Healthcare maintenance        Relevant Orders    Lipid Panel    TSH            Final diagnoses:   [I10] Primary hypertension   [C61] Prostate CA (CMS/HCC)   [E29.1] Hypogonadism in male   [Z00.00] Healthcare maintenance

## 2024-01-05 DIAGNOSIS — E29.1 HYPOGONADISM IN MALE: Primary | ICD-10-CM

## 2024-01-05 DIAGNOSIS — I10 PRIMARY HYPERTENSION: ICD-10-CM

## 2024-01-05 RX ORDER — AMLODIPINE BESYLATE 5 MG/1
10 TABLET ORAL DAILY
Qty: 180 TABLET | Refills: 3 | Status: SHIPPED | OUTPATIENT
Start: 2024-01-05 | End: 2025-01-04

## 2024-01-15 ENCOUNTER — SPECIALTY PHARMACY (OUTPATIENT)
Dept: PHARMACY | Facility: CLINIC | Age: 64
End: 2024-01-15

## 2024-01-15 ENCOUNTER — PHARMACY VISIT (OUTPATIENT)
Dept: PHARMACY | Facility: CLINIC | Age: 64
End: 2024-01-15
Payer: COMMERCIAL

## 2024-01-15 PROCEDURE — RXMED WILLOW AMBULATORY MEDICATION CHARGE

## 2024-01-16 ENCOUNTER — SPECIALTY PHARMACY (OUTPATIENT)
Dept: PHARMACY | Facility: CLINIC | Age: 64
End: 2024-01-16

## 2024-01-19 ENCOUNTER — PHARMACY VISIT (OUTPATIENT)
Dept: PHARMACY | Facility: CLINIC | Age: 64
End: 2024-01-19
Payer: COMMERCIAL

## 2024-01-19 PROCEDURE — RXMED WILLOW AMBULATORY MEDICATION CHARGE

## 2024-01-29 ENCOUNTER — PHARMACY VISIT (OUTPATIENT)
Dept: PHARMACY | Facility: CLINIC | Age: 64
End: 2024-01-29
Payer: COMMERCIAL

## 2024-01-29 PROCEDURE — RXMED WILLOW AMBULATORY MEDICATION CHARGE

## 2024-02-13 PROCEDURE — RXMED WILLOW AMBULATORY MEDICATION CHARGE

## 2024-02-14 ENCOUNTER — PHARMACY VISIT (OUTPATIENT)
Dept: PHARMACY | Facility: CLINIC | Age: 64
End: 2024-02-14
Payer: COMMERCIAL

## 2024-02-16 ENCOUNTER — LAB (OUTPATIENT)
Dept: LAB | Facility: LAB | Age: 64
End: 2024-02-16
Payer: COMMERCIAL

## 2024-02-16 DIAGNOSIS — C61 PROSTATE CA (MULTI): ICD-10-CM

## 2024-02-16 DIAGNOSIS — I10 PRIMARY HYPERTENSION: ICD-10-CM

## 2024-02-16 DIAGNOSIS — Z00.00 HEALTHCARE MAINTENANCE: ICD-10-CM

## 2024-02-16 DIAGNOSIS — E29.1 HYPOGONADISM IN MALE: ICD-10-CM

## 2024-02-16 LAB
ALBUMIN SERPL BCP-MCNC: 4.3 G/DL (ref 3.4–5)
ALP SERPL-CCNC: 93 U/L (ref 33–136)
ALT SERPL W P-5'-P-CCNC: 14 U/L (ref 10–52)
ANION GAP SERPL CALC-SCNC: 12 MMOL/L (ref 10–20)
AST SERPL W P-5'-P-CCNC: 17 U/L (ref 9–39)
BASOPHILS # BLD AUTO: 0.09 X10*3/UL (ref 0–0.1)
BASOPHILS NFR BLD AUTO: 1.7 %
BILIRUB SERPL-MCNC: 0.6 MG/DL (ref 0–1.2)
BUN SERPL-MCNC: 16 MG/DL (ref 6–23)
CALCIUM SERPL-MCNC: 9.3 MG/DL (ref 8.6–10.3)
CHLORIDE SERPL-SCNC: 104 MMOL/L (ref 98–107)
CHOLEST SERPL-MCNC: 159 MG/DL (ref 0–199)
CHOLESTEROL/HDL RATIO: 2.5
CO2 SERPL-SCNC: 27 MMOL/L (ref 21–32)
CREAT SERPL-MCNC: 0.78 MG/DL (ref 0.5–1.3)
EGFRCR SERPLBLD CKD-EPI 2021: >90 ML/MIN/1.73M*2
EOSINOPHIL # BLD AUTO: 0.38 X10*3/UL (ref 0–0.7)
EOSINOPHIL NFR BLD AUTO: 7 %
ERYTHROCYTE [DISTWIDTH] IN BLOOD BY AUTOMATED COUNT: 12.3 % (ref 11.5–14.5)
GLUCOSE SERPL-MCNC: 91 MG/DL (ref 74–99)
HCT VFR BLD AUTO: 45.6 % (ref 41–52)
HDLC SERPL-MCNC: 63 MG/DL
HGB BLD-MCNC: 15.2 G/DL (ref 13.5–17.5)
IMM GRANULOCYTES # BLD AUTO: 0.01 X10*3/UL (ref 0–0.7)
IMM GRANULOCYTES NFR BLD AUTO: 0.2 % (ref 0–0.9)
LDLC SERPL CALC-MCNC: 67 MG/DL
LYMPHOCYTES # BLD AUTO: 1.1 X10*3/UL (ref 1.2–4.8)
LYMPHOCYTES NFR BLD AUTO: 20.3 %
MCH RBC QN AUTO: 30 PG (ref 26–34)
MCHC RBC AUTO-ENTMCNC: 33.3 G/DL (ref 32–36)
MCV RBC AUTO: 90 FL (ref 80–100)
MONOCYTES # BLD AUTO: 0.67 X10*3/UL (ref 0.1–1)
MONOCYTES NFR BLD AUTO: 12.4 %
NEUTROPHILS # BLD AUTO: 3.16 X10*3/UL (ref 1.2–7.7)
NEUTROPHILS NFR BLD AUTO: 58.4 %
NON HDL CHOLESTEROL: 96 MG/DL (ref 0–149)
NRBC BLD-RTO: 0 /100 WBCS (ref 0–0)
PLATELET # BLD AUTO: 372 X10*3/UL (ref 150–450)
POTASSIUM SERPL-SCNC: 3.8 MMOL/L (ref 3.5–5.3)
PROT SERPL-MCNC: 7.7 G/DL (ref 6.4–8.2)
PSA SERPL-MCNC: <0.01 NG/ML
RBC # BLD AUTO: 5.07 X10*6/UL (ref 4.5–5.9)
SODIUM SERPL-SCNC: 139 MMOL/L (ref 136–145)
TESTOST SERPL-MCNC: <30 NG/DL (ref 240–1000)
TRIGL SERPL-MCNC: 145 MG/DL (ref 0–149)
TSH SERPL-ACNC: 1.03 MIU/L (ref 0.44–3.98)
VLDL: 29 MG/DL (ref 0–40)
WBC # BLD AUTO: 5.4 X10*3/UL (ref 4.4–11.3)

## 2024-02-16 PROCEDURE — 84443 ASSAY THYROID STIM HORMONE: CPT

## 2024-02-16 PROCEDURE — 80061 LIPID PANEL: CPT

## 2024-02-16 PROCEDURE — 84403 ASSAY OF TOTAL TESTOSTERONE: CPT

## 2024-02-16 PROCEDURE — 80053 COMPREHEN METABOLIC PANEL: CPT

## 2024-02-16 PROCEDURE — 85025 COMPLETE CBC W/AUTO DIFF WBC: CPT

## 2024-02-16 PROCEDURE — 84153 ASSAY OF PSA TOTAL: CPT

## 2024-02-16 PROCEDURE — 36415 COLL VENOUS BLD VENIPUNCTURE: CPT

## 2024-02-22 ENCOUNTER — OFFICE VISIT (OUTPATIENT)
Dept: HEMATOLOGY/ONCOLOGY | Facility: CLINIC | Age: 64
End: 2024-02-22
Payer: COMMERCIAL

## 2024-02-22 ENCOUNTER — INFUSION (OUTPATIENT)
Dept: HEMATOLOGY/ONCOLOGY | Facility: CLINIC | Age: 64
End: 2024-02-22
Payer: COMMERCIAL

## 2024-02-22 VITALS
BODY MASS INDEX: 30.26 KG/M2 | OXYGEN SATURATION: 95 % | RESPIRATION RATE: 14 BRPM | DIASTOLIC BLOOD PRESSURE: 76 MMHG | SYSTOLIC BLOOD PRESSURE: 113 MMHG | TEMPERATURE: 97.7 F | WEIGHT: 223.11 LBS | HEART RATE: 75 BPM

## 2024-02-22 DIAGNOSIS — C61 PROSTATE CA (MULTI): ICD-10-CM

## 2024-02-22 PROCEDURE — 99215 OFFICE O/P EST HI 40 MIN: CPT | Performed by: NURSE PRACTITIONER

## 2024-02-22 PROCEDURE — 1036F TOBACCO NON-USER: CPT | Performed by: NURSE PRACTITIONER

## 2024-02-22 PROCEDURE — 3074F SYST BP LT 130 MM HG: CPT | Performed by: NURSE PRACTITIONER

## 2024-02-22 PROCEDURE — 3078F DIAST BP <80 MM HG: CPT | Performed by: NURSE PRACTITIONER

## 2024-02-22 PROCEDURE — 96372 THER/PROPH/DIAG INJ SC/IM: CPT | Performed by: NURSE PRACTITIONER

## 2024-02-22 PROCEDURE — 2500000004 HC RX 250 GENERAL PHARMACY W/ HCPCS (ALT 636 FOR OP/ED): Mod: JZ | Performed by: NURSE PRACTITIONER

## 2024-02-22 PROCEDURE — 96402 CHEMO HORMON ANTINEOPL SQ/IM: CPT

## 2024-02-22 RX ORDER — ALBUTEROL SULFATE 0.83 MG/ML
3 SOLUTION RESPIRATORY (INHALATION) AS NEEDED
Status: CANCELLED | OUTPATIENT
Start: 2024-05-09

## 2024-02-22 RX ORDER — FAMOTIDINE 10 MG/ML
20 INJECTION INTRAVENOUS ONCE AS NEEDED
Status: DISCONTINUED | OUTPATIENT
Start: 2024-02-22 | End: 2024-02-22 | Stop reason: HOSPADM

## 2024-02-22 RX ORDER — ALBUTEROL SULFATE 0.83 MG/ML
3 SOLUTION RESPIRATORY (INHALATION) AS NEEDED
Status: DISCONTINUED | OUTPATIENT
Start: 2024-02-22 | End: 2024-02-22 | Stop reason: HOSPADM

## 2024-02-22 RX ORDER — EPINEPHRINE 0.3 MG/.3ML
0.3 INJECTION SUBCUTANEOUS EVERY 5 MIN PRN
Status: CANCELLED | OUTPATIENT
Start: 2024-05-09

## 2024-02-22 RX ORDER — FAMOTIDINE 10 MG/ML
20 INJECTION INTRAVENOUS ONCE AS NEEDED
Status: CANCELLED | OUTPATIENT
Start: 2024-05-09

## 2024-02-22 RX ORDER — DIPHENHYDRAMINE HYDROCHLORIDE 50 MG/ML
50 INJECTION INTRAMUSCULAR; INTRAVENOUS AS NEEDED
Status: DISCONTINUED | OUTPATIENT
Start: 2024-02-22 | End: 2024-02-22 | Stop reason: HOSPADM

## 2024-02-22 RX ORDER — EPINEPHRINE 0.3 MG/.3ML
0.3 INJECTION SUBCUTANEOUS EVERY 5 MIN PRN
Status: DISCONTINUED | OUTPATIENT
Start: 2024-02-22 | End: 2024-02-22 | Stop reason: HOSPADM

## 2024-02-22 RX ORDER — DIPHENHYDRAMINE HYDROCHLORIDE 50 MG/ML
50 INJECTION INTRAMUSCULAR; INTRAVENOUS AS NEEDED
Status: CANCELLED | OUTPATIENT
Start: 2024-05-09

## 2024-02-22 RX ADMIN — TRIPTORELIN PAMOATE 11.25 MG: KIT at 15:37

## 2024-02-22 ASSESSMENT — PAIN SCALES - GENERAL: PAINLEVEL: 0-NO PAIN

## 2024-02-22 NOTE — PROGRESS NOTES
Patient ID: Arvind Cabrera is a 63 y.o. male.  Attending Physician: Dr. Lenard Ware  Cancer Diagnosis:  Cancer Staging   No matching staging information was found for the patient.     Current Therapy: ADT (trelstar q12 weeks)  Abiraterone Acetate 750 mg PO daily  Prednisone 5 mg PO daily    Genetics:   xT  TMPRSS2 - ERG fusion  HRR not detected  MIS-S  TMB 0.0    Subjective      Cancer History:  Oncology History    No history exists.     9/2021  PSA 3.2     10/13/21  Prostate biopsy - Suzette grade group 2, involving 35% of the specimen     12/20/21  Prostatectomy - Suzette grade group 2, tertiary pattern 5, 0/6 lymph nodes positive, staged as pT3aN0, +EPE, +PNI, -SVI, - LVI. Tumor microns from lateral margin     2/1/22  PSA myles at 0.10     8/5/22  PSA 0.18     8/19/22  PSMA PET shows lesion at T11     8/26/22 MR spine shows enhancement at T9 and T11     9/9/22   PSA 0.24     9/15/22  Start ADT     10/6/22  Start abiraterone and prednisone     11/2/22 PSA 0.13, completed radiation with Dr. Nunez     12/7/22 PSA 0.03     3/6/23 PSA undetectable. C/o worsening back pain, MRI reveals treated lesions and unchanged area of edema     5/31/23  PSA undetectable     8/24/23  Abiraterone dose decreased to 750 mg    Interval History:  Mr. Cabrera feels much better from a fatigue standpoint since dose-reducing luan to 750. Since starting oxybutynin, his hot flashes have been significantly improved. His energy had been better than initially, though still slightly decreased. His ankles and (?) hands are slightly swollen, about the same as previously. No pain, unevenness, or heat. Continues on amlodipine. Otherwise, no new or concerning symptoms. The remainder of his ROS is otherwise negative.    HPI    Objective    BSA: There is no height or weight on file to calculate BSA.  There were no vitals taken for this visit.    Physical Exam  General: alert, well-dressed in NAD. Speech is fluent and coherent, words clear. Good insight.  Oriented x4  Skin: warm, dry, and pink without cyanosis or nail clubbing. No rash, petechiae, or ecchymoses  HEENT: Normocephalic atraumatic. Sclera white, conjunctiva pink. EOMs intact. Hearing intact to spoken voice. No visible lesions  Respiratory: Chest expansion symmetric. No audible wheeze. Unlabored breathing.  CV: Good color Slight ankle edema, difficult to tell in hands.  Psych: engaged, polite, appropriate conversation and eye contact.      Current Medications:    Current Outpatient Medications:     abiraterone (Zytiga) 250 mg tablet, TAKE THREE (3) TABLETS BY MOUTH ONCE DAILY., Disp: 90 tablet, Rfl: 11    amLODIPine (Norvasc) 5 mg tablet, TAKE 1 TABLET DAILY., Disp: 90 tablet, Rfl: 3    amLODIPine (Norvasc) 5 mg tablet, TAKE 2 TABLETS DAILY., Disp: 180 tablet, Rfl: 3    losartan (Cozaar) 50 mg tablet, TAKE 1 TABLET BY MOUTH 2 TIMES A DAY, Disp: 180 tablet, Rfl: 3    oxybutynin (Ditropan) 5 mg tablet, Take 1 tablet (5 mg) by mouth 2 times a day., Disp: 60 tablet, Rfl: 2    predniSONE (Deltasone) 5 mg tablet, TAKE ONE (1) TABLET BY MOUTH ONCE A DAY, Disp: 30 tablet, Rfl: 11     Most Recent Labs:  Results for orders placed or performed in visit on 02/16/24   CBC and Auto Differential   Result Value Ref Range    WBC 5.4 4.4 - 11.3 x10*3/uL    nRBC 0.0 0.0 - 0.0 /100 WBCs    RBC 5.07 4.50 - 5.90 x10*6/uL    Hemoglobin 15.2 13.5 - 17.5 g/dL    Hematocrit 45.6 41.0 - 52.0 %    MCV 90 80 - 100 fL    MCH 30.0 26.0 - 34.0 pg    MCHC 33.3 32.0 - 36.0 g/dL    RDW 12.3 11.5 - 14.5 %    Platelets 372 150 - 450 x10*3/uL    Neutrophils % 58.4 40.0 - 80.0 %    Immature Granulocytes %, Automated 0.2 0.0 - 0.9 %    Lymphocytes % 20.3 13.0 - 44.0 %    Monocytes % 12.4 2.0 - 10.0 %    Eosinophils % 7.0 0.0 - 6.0 %    Basophils % 1.7 0.0 - 2.0 %    Neutrophils Absolute 3.16 1.20 - 7.70 x10*3/uL    Immature Granulocytes Absolute, Automated 0.01 0.00 - 0.70 x10*3/uL    Lymphocytes Absolute 1.10 (L) 1.20 - 4.80 x10*3/uL     Monocytes Absolute 0.67 0.10 - 1.00 x10*3/uL    Eosinophils Absolute 0.38 0.00 - 0.70 x10*3/uL    Basophils Absolute 0.09 0.00 - 0.10 x10*3/uL   Comprehensive metabolic panel   Result Value Ref Range    Glucose 91 74 - 99 mg/dL    Sodium 139 136 - 145 mmol/L    Potassium 3.8 3.5 - 5.3 mmol/L    Chloride 104 98 - 107 mmol/L    Bicarbonate 27 21 - 32 mmol/L    Anion Gap 12 10 - 20 mmol/L    Urea Nitrogen 16 6 - 23 mg/dL    Creatinine 0.78 0.50 - 1.30 mg/dL    eGFR >90 >60 mL/min/1.73m*2    Calcium 9.3 8.6 - 10.3 mg/dL    Albumin 4.3 3.4 - 5.0 g/dL    Alkaline Phosphatase 93 33 - 136 U/L    Total Protein 7.7 6.4 - 8.2 g/dL    AST 17 9 - 39 U/L    Bilirubin, Total 0.6 0.0 - 1.2 mg/dL    ALT 14 10 - 52 U/L   PSA   Result Value Ref Range    Prostate Specific AG <0.01 <=4.00 ng/mL   Testosterone   Result Value Ref Range    Testosterone <30 (L) 240 - 1,000 ng/dL   Lipid Panel   Result Value Ref Range    Cholesterol 159 0 - 199 mg/dL    HDL-Cholesterol 63.0 mg/dL    Cholesterol/HDL Ratio 2.5     LDL Calculated 67 <=99 mg/dL    VLDL 29 0 - 40 mg/dL    Triglycerides 145 0 - 149 mg/dL    Non HDL Cholesterol 96 0 - 149 mg/dL   TSH   Result Value Ref Range    Thyroid Stimulating Hormone 1.03 0.44 - 3.98 mIU/L      Lab Results   Component Value Date    PSA <0.01 02/16/2024    PSA <0.01 11/27/2023    PSA <0.10 08/24/2023        Performance Status:  ECOG Score: 0- Fully active, able to carry on all pre-disease performance w/o restriction.  Karnofsky Score: 90 - Able to carry on normal activity; minor signs or symptoms of disease       Assessment/Plan   Arvind Cabrera is a 63 y.o. male with oligometastatic prostate cancer who presents in follow up on ADT and AA/P (dose reduced).    He was diagnosed with [id number] GG2 prostate adenocarcinoma in 2021 and underwent prostatectomy. At the time, he had some high risk features including  EPE and close margins. In August 2022, he was found to have residual PSA and underwent PSMA PET  imaging, which revealed a lesion at T11. He had no other sites of disease on imaging. He completed radiation to T11 and did not receive pelvic XRT.     Tolerating well, see below for AE management. He will continue treatment and plans to complete a two year course.    # Oligometastatic Prostate Cancer  - Continue ADT every 3 months, due today, next due May 2024  - Continue abiraterone at reduced dose of 750 mg PO, prednisone 5 mg PO daily  - No actionable mutations on NGS  - S/p radiation to T11  - Plan is for 2 years of therapy    # Back pain, OK  - Continue with ortho     # Fatigue, much improved  - Continue activity  - Dose reduction of abiraterone      # Hot flashes, did not tolerate venlafaxine  - Continue with acupuncture   - Continue oxybutynin 5 mg PO BID     # Bone Health  - Continue calcium and vitamin D, at least 1000 units each daily  - Continue regular weight bearing physical activity     # HTN, hypotension today without symptoms  # Ankle edema  - Closely monitor BP  - Continue to follow with cardiology  - May need dose reduction of amlodipine  - Monitor when forgetting prednisone dose for worsening/improved swelling if correlation     # Urinary Incontinence  - Follows with urology (Dr. Alberts)    RTC 12 weeks with labs for next injection    Total time spent on this encounter was 40 minutes, which included preparation, direct time with patient, documentation, and care coordination on the day of visit.    Janel Burkett, MSN, APRN, AGNP-C, AOCNP  Associate Nurse Practitioner  [facility], [facility]

## 2024-03-04 ENCOUNTER — PHARMACY VISIT (OUTPATIENT)
Dept: PHARMACY | Facility: CLINIC | Age: 64
End: 2024-03-04
Payer: COMMERCIAL

## 2024-03-04 PROCEDURE — RXMED WILLOW AMBULATORY MEDICATION CHARGE

## 2024-03-12 ENCOUNTER — PHARMACY VISIT (OUTPATIENT)
Dept: PHARMACY | Facility: CLINIC | Age: 64
End: 2024-03-12
Payer: COMMERCIAL

## 2024-03-12 ENCOUNTER — SPECIALTY PHARMACY (OUTPATIENT)
Dept: HEMATOLOGY/ONCOLOGY | Facility: HOSPITAL | Age: 64
End: 2024-03-12
Payer: COMMERCIAL

## 2024-03-12 DIAGNOSIS — C61 PROSTATE CA (MULTI): Primary | ICD-10-CM

## 2024-03-12 PROCEDURE — RXMED WILLOW AMBULATORY MEDICATION CHARGE

## 2024-03-12 NOTE — PROGRESS NOTES
[facility] Clinical Note    Arvind Cabrera is a 63 y.o. male, who is on the specialty pharmacy service for management of: Oncology Core with status of: (Enrolled)     Arvind was contacted on 3/12/2024.    Refer to the encounter summary report for documentation details about patient counseling and education.    [facility] Clinical Note    Arvind Cabrera is a 63 y.o. male, who is on the specialty pharmacy service for management of: Oncology Core with status of: (Enrolled)     Arvind was contacted on 3/12/2024.    Refer to the encounter summary report for documentation details about patient counseling and education.      Medication Adherence  The importance of adherence was discussed with the patient and they were advised to take the medication as prescribed by their provider. Arvind was encouraged to call his physician's office if they have a question regarding a missed dose.        Patient advised to contact the pharmacy if there are any changes to her medication list, including prescriptions, OTC medications, herbal products, or supplements. Patient was advised of [facility]’s dispensing process, refill timeline, contact information ([phone]), and patient management follow up. Patient confirmed understanding of education conducted during assessment. All patient questions and concerns were addressed to the best of my ability. Patient was encouraged to contact the specialty pharmacy with any questions or concerns.    Confirmed follow-up outreaches are properly scheduled. Reviewed goals of therapy in the program targets.    Hitesh Prince, PharmD, CSP  Clinical Pharm Specialist -  Oncology  [facility]  Phone #: [phone]  Fax #: [phone]  Email: [email or web address]

## 2024-03-13 ENCOUNTER — SPECIALTY PHARMACY (OUTPATIENT)
Dept: PHARMACY | Facility: CLINIC | Age: 64
End: 2024-03-13

## 2024-03-13 PROCEDURE — RXMED WILLOW AMBULATORY MEDICATION CHARGE

## 2024-03-14 ENCOUNTER — PHARMACY VISIT (OUTPATIENT)
Dept: PHARMACY | Facility: CLINIC | Age: 64
End: 2024-03-14
Payer: COMMERCIAL

## 2024-04-02 ENCOUNTER — TELEPHONE (OUTPATIENT)
Dept: ADMISSION | Facility: HOSPITAL | Age: 64
End: 2024-04-02
Payer: COMMERCIAL

## 2024-04-02 DIAGNOSIS — C61 PROSTATE CA (MULTI): ICD-10-CM

## 2024-04-02 PROCEDURE — RXMED WILLOW AMBULATORY MEDICATION CHARGE

## 2024-04-02 RX ORDER — OXYBUTYNIN CHLORIDE 5 MG/1
5 TABLET ORAL 2 TIMES DAILY
Qty: 60 TABLET | Refills: 2 | Status: SHIPPED | OUTPATIENT
Start: 2024-04-02 | End: 2024-06-11 | Stop reason: SDUPTHER

## 2024-04-02 NOTE — TELEPHONE ENCOUNTER
Patient called the refill line to request Oxybutynin 5 mg be sent in to the  pharmacy on file.   Next MD FUV is 05/16/24.

## 2024-04-03 ENCOUNTER — PHARMACY VISIT (OUTPATIENT)
Dept: PHARMACY | Facility: CLINIC | Age: 64
End: 2024-04-03
Payer: COMMERCIAL

## 2024-04-09 PROCEDURE — RXMED WILLOW AMBULATORY MEDICATION CHARGE

## 2024-04-11 ENCOUNTER — SPECIALTY PHARMACY (OUTPATIENT)
Dept: PHARMACY | Facility: CLINIC | Age: 64
End: 2024-04-11

## 2024-04-11 PROCEDURE — RXMED WILLOW AMBULATORY MEDICATION CHARGE

## 2024-04-12 ENCOUNTER — PHARMACY VISIT (OUTPATIENT)
Dept: PHARMACY | Facility: CLINIC | Age: 64
End: 2024-04-12
Payer: COMMERCIAL

## 2024-04-15 ENCOUNTER — SPECIALTY PHARMACY (OUTPATIENT)
Dept: PHARMACY | Facility: CLINIC | Age: 64
End: 2024-04-15

## 2024-04-24 ENCOUNTER — PHARMACY VISIT (OUTPATIENT)
Dept: PHARMACY | Facility: CLINIC | Age: 64
End: 2024-04-24
Payer: COMMERCIAL

## 2024-04-24 PROCEDURE — RXMED WILLOW AMBULATORY MEDICATION CHARGE

## 2024-05-06 ENCOUNTER — PHARMACY VISIT (OUTPATIENT)
Dept: PHARMACY | Facility: CLINIC | Age: 64
End: 2024-05-06
Payer: COMMERCIAL

## 2024-05-06 PROCEDURE — RXMED WILLOW AMBULATORY MEDICATION CHARGE

## 2024-05-08 PROCEDURE — RXMED WILLOW AMBULATORY MEDICATION CHARGE

## 2024-05-13 ENCOUNTER — SPECIALTY PHARMACY (OUTPATIENT)
Dept: PHARMACY | Facility: CLINIC | Age: 64
End: 2024-05-13

## 2024-05-14 ENCOUNTER — PHARMACY VISIT (OUTPATIENT)
Dept: PHARMACY | Facility: CLINIC | Age: 64
End: 2024-05-14
Payer: COMMERCIAL

## 2024-05-15 ENCOUNTER — LAB (OUTPATIENT)
Dept: LAB | Facility: LAB | Age: 64
End: 2024-05-15
Payer: COMMERCIAL

## 2024-05-15 DIAGNOSIS — C61 PROSTATE CA (MULTI): ICD-10-CM

## 2024-05-15 LAB
ALBUMIN SERPL BCP-MCNC: 4.1 G/DL (ref 3.4–5)
ALP SERPL-CCNC: 78 U/L (ref 33–136)
ALT SERPL W P-5'-P-CCNC: 14 U/L (ref 10–52)
ANION GAP SERPL CALC-SCNC: 9 MMOL/L (ref 10–20)
AST SERPL W P-5'-P-CCNC: 16 U/L (ref 9–39)
BASOPHILS # BLD AUTO: 0.08 X10*3/UL (ref 0–0.1)
BASOPHILS NFR BLD AUTO: 1.5 %
BILIRUB SERPL-MCNC: 0.6 MG/DL (ref 0–1.2)
BUN SERPL-MCNC: 17 MG/DL (ref 6–23)
CALCIUM SERPL-MCNC: 9.2 MG/DL (ref 8.6–10.3)
CHLORIDE SERPL-SCNC: 106 MMOL/L (ref 98–107)
CO2 SERPL-SCNC: 28 MMOL/L (ref 21–32)
CREAT SERPL-MCNC: 0.71 MG/DL (ref 0.5–1.3)
EGFRCR SERPLBLD CKD-EPI 2021: >90 ML/MIN/1.73M*2
EOSINOPHIL # BLD AUTO: 0.45 X10*3/UL (ref 0–0.7)
EOSINOPHIL NFR BLD AUTO: 8.3 %
ERYTHROCYTE [DISTWIDTH] IN BLOOD BY AUTOMATED COUNT: 12.1 % (ref 11.5–14.5)
GLUCOSE SERPL-MCNC: 93 MG/DL (ref 74–99)
HCT VFR BLD AUTO: 41.7 % (ref 41–52)
HGB BLD-MCNC: 13.9 G/DL (ref 13.5–17.5)
IMM GRANULOCYTES # BLD AUTO: 0.01 X10*3/UL (ref 0–0.7)
IMM GRANULOCYTES NFR BLD AUTO: 0.2 % (ref 0–0.9)
LYMPHOCYTES # BLD AUTO: 1.24 X10*3/UL (ref 1.2–4.8)
LYMPHOCYTES NFR BLD AUTO: 23 %
MCH RBC QN AUTO: 30.4 PG (ref 26–34)
MCHC RBC AUTO-ENTMCNC: 33.3 G/DL (ref 32–36)
MCV RBC AUTO: 91 FL (ref 80–100)
MONOCYTES # BLD AUTO: 0.64 X10*3/UL (ref 0.1–1)
MONOCYTES NFR BLD AUTO: 11.9 %
NEUTROPHILS # BLD AUTO: 2.97 X10*3/UL (ref 1.2–7.7)
NEUTROPHILS NFR BLD AUTO: 55.1 %
NRBC BLD-RTO: 0 /100 WBCS (ref 0–0)
PLATELET # BLD AUTO: 314 X10*3/UL (ref 150–450)
POTASSIUM SERPL-SCNC: 3.9 MMOL/L (ref 3.5–5.3)
PROT SERPL-MCNC: 6.8 G/DL (ref 6.4–8.2)
PSA SERPL-MCNC: <0.01 NG/ML
RBC # BLD AUTO: 4.57 X10*6/UL (ref 4.5–5.9)
SODIUM SERPL-SCNC: 139 MMOL/L (ref 136–145)
TESTOST SERPL-MCNC: <30 NG/DL (ref 240–1000)
WBC # BLD AUTO: 5.4 X10*3/UL (ref 4.4–11.3)

## 2024-05-15 PROCEDURE — 80053 COMPREHEN METABOLIC PANEL: CPT

## 2024-05-15 PROCEDURE — 85025 COMPLETE CBC W/AUTO DIFF WBC: CPT

## 2024-05-15 PROCEDURE — 84153 ASSAY OF PSA TOTAL: CPT

## 2024-05-15 PROCEDURE — 84403 ASSAY OF TOTAL TESTOSTERONE: CPT

## 2024-05-15 PROCEDURE — 36415 COLL VENOUS BLD VENIPUNCTURE: CPT

## 2024-05-16 ENCOUNTER — OFFICE VISIT (OUTPATIENT)
Dept: HEMATOLOGY/ONCOLOGY | Facility: CLINIC | Age: 64
End: 2024-05-16
Payer: COMMERCIAL

## 2024-05-16 ENCOUNTER — INFUSION (OUTPATIENT)
Dept: HEMATOLOGY/ONCOLOGY | Facility: CLINIC | Age: 64
End: 2024-05-16
Payer: COMMERCIAL

## 2024-05-16 VITALS
DIASTOLIC BLOOD PRESSURE: 93 MMHG | RESPIRATION RATE: 18 BRPM | WEIGHT: 220.68 LBS | TEMPERATURE: 97.9 F | HEART RATE: 74 BPM | BODY MASS INDEX: 29.93 KG/M2 | SYSTOLIC BLOOD PRESSURE: 144 MMHG | OXYGEN SATURATION: 97 %

## 2024-05-16 VITALS
RESPIRATION RATE: 16 BRPM | BODY MASS INDEX: 24.65 KG/M2 | TEMPERATURE: 98.3 F | OXYGEN SATURATION: 98 % | SYSTOLIC BLOOD PRESSURE: 164 MMHG | DIASTOLIC BLOOD PRESSURE: 91 MMHG | HEART RATE: 74 BPM | WEIGHT: 181.77 LBS

## 2024-05-16 DIAGNOSIS — C61 PROSTATE CA (MULTI): ICD-10-CM

## 2024-05-16 PROCEDURE — 1036F TOBACCO NON-USER: CPT | Performed by: NURSE PRACTITIONER

## 2024-05-16 PROCEDURE — 2500000004 HC RX 250 GENERAL PHARMACY W/ HCPCS (ALT 636 FOR OP/ED): Mod: JZ | Performed by: NURSE PRACTITIONER

## 2024-05-16 PROCEDURE — 99215 OFFICE O/P EST HI 40 MIN: CPT | Performed by: NURSE PRACTITIONER

## 2024-05-16 PROCEDURE — 3080F DIAST BP >= 90 MM HG: CPT | Performed by: NURSE PRACTITIONER

## 2024-05-16 PROCEDURE — 3077F SYST BP >= 140 MM HG: CPT | Performed by: NURSE PRACTITIONER

## 2024-05-16 PROCEDURE — 96402 CHEMO HORMON ANTINEOPL SQ/IM: CPT

## 2024-05-16 RX ORDER — DIPHENHYDRAMINE HYDROCHLORIDE 50 MG/ML
50 INJECTION INTRAMUSCULAR; INTRAVENOUS AS NEEDED
Status: DISCONTINUED | OUTPATIENT
Start: 2024-05-16 | End: 2024-05-16 | Stop reason: HOSPADM

## 2024-05-16 RX ORDER — EPINEPHRINE 0.3 MG/.3ML
0.3 INJECTION SUBCUTANEOUS EVERY 5 MIN PRN
OUTPATIENT
Start: 2024-08-07

## 2024-05-16 RX ORDER — EPINEPHRINE 0.3 MG/.3ML
0.3 INJECTION SUBCUTANEOUS EVERY 5 MIN PRN
Status: DISCONTINUED | OUTPATIENT
Start: 2024-05-16 | End: 2024-05-16 | Stop reason: HOSPADM

## 2024-05-16 RX ORDER — DIPHENHYDRAMINE HYDROCHLORIDE 50 MG/ML
50 INJECTION INTRAMUSCULAR; INTRAVENOUS AS NEEDED
OUTPATIENT
Start: 2024-08-07

## 2024-05-16 RX ORDER — FAMOTIDINE 10 MG/ML
20 INJECTION INTRAVENOUS ONCE AS NEEDED
Status: DISCONTINUED | OUTPATIENT
Start: 2024-05-16 | End: 2024-05-16 | Stop reason: HOSPADM

## 2024-05-16 RX ORDER — ALBUTEROL SULFATE 0.83 MG/ML
3 SOLUTION RESPIRATORY (INHALATION) AS NEEDED
OUTPATIENT
Start: 2024-08-07

## 2024-05-16 RX ORDER — FAMOTIDINE 10 MG/ML
20 INJECTION INTRAVENOUS ONCE AS NEEDED
OUTPATIENT
Start: 2024-08-07

## 2024-05-16 RX ORDER — ALBUTEROL SULFATE 0.83 MG/ML
3 SOLUTION RESPIRATORY (INHALATION) AS NEEDED
Status: DISCONTINUED | OUTPATIENT
Start: 2024-05-16 | End: 2024-05-16 | Stop reason: HOSPADM

## 2024-05-16 RX ADMIN — TRIPTORELIN PAMOATE 11.25 MG: KIT at 15:33

## 2024-05-16 ASSESSMENT — PAIN SCALES - GENERAL
PAINLEVEL: 0-NO PAIN
PAINLEVEL: 0-NO PAIN

## 2024-05-16 NOTE — PROGRESS NOTES
Patient ID: Arvind Cabrera is a 63 y.o. male.  Attending Physician: Dr. Lenard Ware  Cancer Diagnosis:  Cancer Staging   No matching staging information was found for the patient.     Current Therapy: ADT (trelstar q12 weeks)  Abiraterone Acetate 750 mg PO daily  Prednisone 5 mg PO daily    Genetics:   xT  TMPRSS2 - ERG fusion  HRR not detected  MIS-S  TMB 0.0    Subjective      Cancer History:  Oncology History    No history exists.     9/2021  PSA 3.2     10/13/21  Prostate biopsy - [place] grade group 2, involving 35% of the specimen     12/20/21  Prostatectomy - [place] grade group 2, tertiary pattern 5, 0/6 lymph nodes positive, staged as pT3aN0, +EPE, +PNI, -SVI, - LVI. Tumor microns from lateral margin     2/1/22  PSA myles at 0.10     8/5/22  PSA 0.18     8/19/22  PSMA PET shows lesion at T11     8/26/22 MR spine shows enhancement at T9 and T11     9/9/22   PSA 0.24     9/15/22  Start ADT     10/6/22  Start abiraterone and prednisone     11/2/22 PSA 0.13, completed radiation with Dr. Nunez     12/7/22 PSA 0.03     3/6/23 PSA undetectable. C/o worsening back pain, MRI reveals treated lesions and unchanged area of edema     5/31/23  PSA undetectable     8/24/23  Abiraterone dose decreased to 750 mg    Interval History:  Mr. Cabrera feels well overall. Still continues to have fatigue, though is able to do his normal daily activities. Hot flashes much more controlled on oxybutynin, though has dry mouth and dry eye symptoms. Urinary symptoms have also improved. He has brittle nails. Recovering from hurting his back, which he reports is 90% improved with taking it easy. BP has been OK at home and no s/s. He otherwise has no new or concerning symptoms. The remainder of his ROS is otherwise negative.    HPI    Objective    BSA: 2.25 meters squared  BP (!) 144/93 (BP Location: Right arm, Patient Position: Sitting, BP Cuff Size: Adult)   Pulse 74   Temp 36.6 °C (97.9 °F) (Temporal)   Resp 18   Wt 100 kg (220 lb  10.9 oz)   SpO2 97%   BMI 29.93 kg/m²     Physical Exam  General: alert, well-dressed in NAD. Speech is fluent and coherent, words clear. Good insight. Oriented x4  Skin: warm, dry, and pink without cyanosis or nail clubbing. No rash, petechiae, or ecchymoses  HEENT: Normocephalic atraumatic. Sclera white, conjunctiva pink. EOMs intact. Hearing intact to spoken voice. No visible lesions  Respiratory: Chest expansion symmetric. No audible wheeze. Unlabored breathing.  CV: Good colorr  Psych: engaged, polite, appropriate conversation and eye contact.      Current Medications:    Current Outpatient Medications:     abiraterone (Zytiga) 250 mg tablet, TAKE THREE (3) TABLETS BY MOUTH ONCE DAILY., Disp: 90 tablet, Rfl: 11    amLODIPine (Norvasc) 5 mg tablet, TAKE 2 TABLETS DAILY., Disp: 180 tablet, Rfl: 3    losartan (Cozaar) 50 mg tablet, TAKE 1 TABLET BY MOUTH 2 TIMES A DAY, Disp: 180 tablet, Rfl: 3    oxybutynin (Ditropan) 5 mg tablet, Take 1 tablet (5 mg) by mouth 2 times a day., Disp: 60 tablet, Rfl: 2    predniSONE (Deltasone) 5 mg tablet, TAKE ONE (1) TABLET BY MOUTH ONCE A DAY, Disp: 30 tablet, Rfl: 11    amLODIPine (Norvasc) 5 mg tablet, TAKE 1 TABLET DAILY., Disp: 90 tablet, Rfl: 3     Most Recent Labs:  Results for orders placed or performed in visit on 05/15/24   CBC and Auto Differential   Result Value Ref Range    WBC 5.4 4.4 - 11.3 x10*3/uL    nRBC 0.0 0.0 - 0.0 /100 WBCs    RBC 4.57 4.50 - 5.90 x10*6/uL    Hemoglobin 13.9 13.5 - 17.5 g/dL    Hematocrit 41.7 41.0 - 52.0 %    MCV 91 80 - 100 fL    MCH 30.4 26.0 - 34.0 pg    MCHC 33.3 32.0 - 36.0 g/dL    RDW 12.1 11.5 - 14.5 %    Platelets 314 150 - 450 x10*3/uL    Neutrophils % 55.1 40.0 - 80.0 %    Immature Granulocytes %, Automated 0.2 0.0 - 0.9 %    Lymphocytes % 23.0 13.0 - 44.0 %    Monocytes % 11.9 2.0 - 10.0 %    Eosinophils % 8.3 0.0 - 6.0 %    Basophils % 1.5 0.0 - 2.0 %    Neutrophils Absolute 2.97 1.20 - 7.70 x10*3/uL    Immature Granulocytes  Absolute, Automated 0.01 0.00 - 0.70 x10*3/uL    Lymphocytes Absolute 1.24 1.20 - 4.80 x10*3/uL    Monocytes Absolute 0.64 0.10 - 1.00 x10*3/uL    Eosinophils Absolute 0.45 0.00 - 0.70 x10*3/uL    Basophils Absolute 0.08 0.00 - 0.10 x10*3/uL   Comprehensive metabolic panel   Result Value Ref Range    Glucose 93 74 - 99 mg/dL    Sodium 139 136 - 145 mmol/L    Potassium 3.9 3.5 - 5.3 mmol/L    Chloride 106 98 - 107 mmol/L    Bicarbonate 28 21 - 32 mmol/L    Anion Gap 9 (L) 10 - 20 mmol/L    Urea Nitrogen 17 6 - 23 mg/dL    Creatinine 0.71 0.50 - 1.30 mg/dL    eGFR >90 >60 mL/min/1.73m*2    Calcium 9.2 8.6 - 10.3 mg/dL    Albumin 4.1 3.4 - 5.0 g/dL    Alkaline Phosphatase 78 33 - 136 U/L    Total Protein 6.8 6.4 - 8.2 g/dL    AST 16 9 - 39 U/L    Bilirubin, Total 0.6 0.0 - 1.2 mg/dL    ALT 14 10 - 52 U/L   PSA   Result Value Ref Range    Prostate Specific AG <0.01 <=4.00 ng/mL   Testosterone   Result Value Ref Range    Testosterone <30 (L) 240 - 1,000 ng/dL      Lab Results   Component Value Date    PSA <0.01 05/15/2024    PSA <0.01 02/16/2024    PSA <0.01 11/27/2023        Performance Status:  ECOG Score: 0- Fully active, able to carry on all pre-disease performance w/o restriction.  Karnofsky Score: 90 - Able to carry on normal activity; minor signs or symptoms of disease       Assessment/Plan   Arvind Cabrera is a 63 y.o. male with oligometastatic prostate cancer who presents in follow up on ADT and AA/P (dose reduced).    He was diagnosed with [id number] GG2 prostate adenocarcinoma in 2021 and underwent prostatectomy. At the time, he had some high risk features including  EPE and close margins. In August 2022, he was found to have residual PSA and underwent PSMA PET imaging, which revealed a lesion at T11. He had no other sites of disease on imaging. He completed radiation to T11 and did not receive pelvic XRT.     Tolerating well, see below for AE management. He will continue treatment and plans to complete a  two year course, which will complete next visit.    # Oligometastatic Prostate Cancer  - Continue ADT every 3 months, due today, next due August 2024 if continuing  - Continue abiraterone at reduced dose of 750 mg PO, prednisone 5 mg PO daily  - No actionable mutations on NGS  - S/p radiation to T11  - Plan is for 2 years of therapy    # Back pain, improving  - Continue with ortho     # Fatigue, much improved  - Continue activity  - Dose reduction of abiraterone      # Hot flashes, did not tolerate venlafaxine  - Continue with acupuncture   - Continue oxybutynin 5 mg PO BID    # Dry mouth  # Dry eye  - Hydration  - Lubricating eye drops     # Bone Health  - Continue calcium and vitamin D, at least 1000 units each daily  - Continue regular weight bearing physical activity     # HTN  - Closely monitor BP  - Continue to follow with cardiology     # Urinary Incontinence  - Follows with urology (Dr. Alberts)    RTC 12 weeks with labs for consideration of off therapy    Total time spent on this encounter was 45 minutes, which included preparation, direct time with patient, documentation, and care coordination on the day of visit.    Janel Burkett, MSN, APRN, AGNP-C, AOCNP  Associate Nurse Practitioner  [facility], [facility]

## 2024-06-04 ENCOUNTER — PHARMACY VISIT (OUTPATIENT)
Dept: PHARMACY | Facility: CLINIC | Age: 64
End: 2024-06-04
Payer: COMMERCIAL

## 2024-06-04 PROCEDURE — RXMED WILLOW AMBULATORY MEDICATION CHARGE

## 2024-06-07 PROCEDURE — RXMED WILLOW AMBULATORY MEDICATION CHARGE

## 2024-06-10 ENCOUNTER — SPECIALTY PHARMACY (OUTPATIENT)
Dept: PHARMACY | Facility: CLINIC | Age: 64
End: 2024-06-10

## 2024-06-11 ENCOUNTER — TELEPHONE (OUTPATIENT)
Dept: PRIMARY CARE | Facility: CLINIC | Age: 64
End: 2024-06-11

## 2024-06-11 ENCOUNTER — OFFICE VISIT (OUTPATIENT)
Dept: PRIMARY CARE | Facility: CLINIC | Age: 64
End: 2024-06-11
Payer: COMMERCIAL

## 2024-06-11 ENCOUNTER — PHARMACY VISIT (OUTPATIENT)
Dept: PHARMACY | Facility: CLINIC | Age: 64
End: 2024-06-11
Payer: COMMERCIAL

## 2024-06-11 VITALS
WEIGHT: 219.3 LBS | HEART RATE: 66 BPM | SYSTOLIC BLOOD PRESSURE: 137 MMHG | HEIGHT: 72 IN | BODY MASS INDEX: 29.7 KG/M2 | DIASTOLIC BLOOD PRESSURE: 90 MMHG

## 2024-06-11 DIAGNOSIS — I10 PRIMARY HYPERTENSION: ICD-10-CM

## 2024-06-11 DIAGNOSIS — C61 PROSTATE CA (MULTI): Primary | ICD-10-CM

## 2024-06-11 DIAGNOSIS — G62.9 NEUROPATHY: Primary | ICD-10-CM

## 2024-06-11 DIAGNOSIS — C61 PROSTATE CA (MULTI): ICD-10-CM

## 2024-06-11 PROCEDURE — 99214 OFFICE O/P EST MOD 30 MIN: CPT

## 2024-06-11 PROCEDURE — 3075F SYST BP GE 130 - 139MM HG: CPT

## 2024-06-11 PROCEDURE — 3080F DIAST BP >= 90 MM HG: CPT

## 2024-06-11 PROCEDURE — 1036F TOBACCO NON-USER: CPT

## 2024-06-11 PROCEDURE — RXMED WILLOW AMBULATORY MEDICATION CHARGE

## 2024-06-11 RX ORDER — LOSARTAN POTASSIUM 50 MG/1
50 TABLET ORAL 2 TIMES DAILY
Qty: 180 TABLET | Refills: 3 | Status: SHIPPED | OUTPATIENT
Start: 2024-06-11 | End: 2025-06-11

## 2024-06-11 RX ORDER — PREGABALIN 75 MG/1
75 CAPSULE ORAL 2 TIMES DAILY
Qty: 60 CAPSULE | Refills: 0 | Status: SHIPPED | OUTPATIENT
Start: 2024-06-11 | End: 2024-07-11

## 2024-06-11 RX ORDER — OXYBUTYNIN CHLORIDE 5 MG/1
5 TABLET ORAL 2 TIMES DAILY
Qty: 60 TABLET | Refills: 2 | Status: SHIPPED | OUTPATIENT
Start: 2024-06-11 | End: 2025-06-11

## 2024-06-11 ASSESSMENT — ENCOUNTER SYMPTOMS
COLOR CHANGE: 0
FLANK PAIN: 0
HALLUCINATIONS: 0
JOINT SWELLING: 1
DIARRHEA: 0
WEAKNESS: 0
SHORTNESS OF BREATH: 0
EYE PAIN: 0
SINUS PRESSURE: 0
PALPITATIONS: 0
ABDOMINAL PAIN: 0
HEADACHES: 0
CONSTIPATION: 0
SORE THROAT: 0
EYE REDNESS: 0
DIZZINESS: 0
FEVER: 0
BACK PAIN: 0
NUMBNESS: 0
DIFFICULTY URINATING: 0
NAUSEA: 0
FATIGUE: 1
SINUS PAIN: 0
EYE ITCHING: 0
CHILLS: 0
HEMATURIA: 0
COUGH: 0
VOMITING: 0

## 2024-06-11 NOTE — PROGRESS NOTES
Subjective   Patient ID: Arvind Cabrera is a 63 y.o. male who presents for Med Management.    HPI   Overall reports he feels well.Reports his cancer meds make him feel tired.     Patient is still following with oncology for his prostate ca. Follows every 3 months. Still taking oxybutynin for the hot flashes.     Hip pain that comes and goes. He says some days are worse than others.     Review of Systems   Constitutional:  Positive for fatigue. Negative for chills and fever.   HENT:  Negative for congestion, sinus pressure, sinus pain and sore throat.    Eyes:  Negative for pain, redness and itching.   Respiratory:  Negative for cough and shortness of breath.    Cardiovascular:  Negative for chest pain, palpitations and leg swelling.   Gastrointestinal:  Negative for abdominal pain, constipation, diarrhea, nausea and vomiting.   Endocrine: Negative for cold intolerance and heat intolerance.   Genitourinary:  Negative for difficulty urinating, flank pain and hematuria.   Musculoskeletal:  Positive for joint swelling. Negative for back pain and gait problem.   Skin:  Negative for color change.   Neurological:  Negative for dizziness, weakness, numbness and headaches.   Psychiatric/Behavioral:  Negative for hallucinations and suicidal ideas.        Objective   /90 (Patient Position: Sitting)   Pulse 66   Ht 1.829 m (6')   Wt 99.5 kg (219 lb 4.8 oz)   BMI 29.74 kg/m²     Physical Exam  Vitals and nursing note reviewed.   Constitutional:       Appearance: Normal appearance.   HENT:      Right Ear: Tympanic membrane normal.      Nose: Nose normal.   Eyes:      Extraocular Movements: Extraocular movements intact.      Pupils: Pupils are equal, round, and reactive to light.   Cardiovascular:      Rate and Rhythm: Normal rate and regular rhythm.   Pulmonary:      Effort: Pulmonary effort is normal.      Breath sounds: Normal breath sounds.   Abdominal:      General: Abdomen is flat. Bowel sounds are normal.       Palpations: Abdomen is soft.   Musculoskeletal:      Cervical back: Normal range of motion.      Right lower leg: Edema present.      Left lower leg: Edema present.   Skin:     General: Skin is warm and dry.      Capillary Refill: Capillary refill takes less than 2 seconds.   Neurological:      Mental Status: He is alert and oriented to person, place, and time.   Psychiatric:         Mood and Affect: Mood normal.         Behavior: Behavior normal.         Assessment/Plan   Problem List Items Addressed This Visit             ICD-10-CM       Cardiac and Vasculature    HTN (hypertension) I10    Relevant Medications    losartan (Cozaar) 50 mg tablet       Hematology and Neoplasia    Prostate CA (Multi) C61    Relevant Medications    oxybutynin (Ditropan) 5 mg tablet     Other Visit Diagnoses         Codes    Neuropathy    -  Primary G62.9    Relevant Medications    pregabalin (Lyrica) 75 mg capsule

## 2024-06-12 PROCEDURE — RXMED WILLOW AMBULATORY MEDICATION CHARGE

## 2024-06-13 ENCOUNTER — PHARMACY VISIT (OUTPATIENT)
Dept: PHARMACY | Facility: CLINIC | Age: 64
End: 2024-06-13
Payer: COMMERCIAL

## 2024-06-25 ENCOUNTER — APPOINTMENT (OUTPATIENT)
Dept: PRIMARY CARE | Facility: CLINIC | Age: 64
End: 2024-06-25
Payer: COMMERCIAL

## 2024-07-08 ENCOUNTER — PHARMACY VISIT (OUTPATIENT)
Dept: PHARMACY | Facility: CLINIC | Age: 64
End: 2024-07-08
Payer: COMMERCIAL

## 2024-07-08 PROCEDURE — RXMED WILLOW AMBULATORY MEDICATION CHARGE

## 2024-07-09 PROCEDURE — RXMED WILLOW AMBULATORY MEDICATION CHARGE

## 2024-07-12 ENCOUNTER — APPOINTMENT (OUTPATIENT)
Dept: PRIMARY CARE | Facility: CLINIC | Age: 64
End: 2024-07-12
Payer: COMMERCIAL

## 2024-07-15 ENCOUNTER — PHARMACY VISIT (OUTPATIENT)
Dept: PHARMACY | Facility: CLINIC | Age: 64
End: 2024-07-15
Payer: COMMERCIAL

## 2024-07-15 ENCOUNTER — SPECIALTY PHARMACY (OUTPATIENT)
Dept: PHARMACY | Facility: CLINIC | Age: 64
End: 2024-07-15

## 2024-07-26 ENCOUNTER — PHARMACY VISIT (OUTPATIENT)
Dept: PHARMACY | Facility: CLINIC | Age: 64
End: 2024-07-26
Payer: COMMERCIAL

## 2024-07-26 PROCEDURE — RXMED WILLOW AMBULATORY MEDICATION CHARGE

## 2024-08-05 ENCOUNTER — APPOINTMENT (OUTPATIENT)
Dept: HEMATOLOGY/ONCOLOGY | Facility: CLINIC | Age: 64
End: 2024-08-05
Payer: COMMERCIAL

## 2024-08-07 ENCOUNTER — PHARMACY VISIT (OUTPATIENT)
Dept: PHARMACY | Facility: CLINIC | Age: 64
End: 2024-08-07
Payer: COMMERCIAL

## 2024-08-07 PROCEDURE — RXMED WILLOW AMBULATORY MEDICATION CHARGE

## 2024-08-08 ENCOUNTER — APPOINTMENT (OUTPATIENT)
Dept: HEMATOLOGY/ONCOLOGY | Facility: CLINIC | Age: 64
End: 2024-08-08
Payer: COMMERCIAL

## 2024-08-09 ENCOUNTER — HOSPITAL ENCOUNTER (OUTPATIENT)
Dept: RADIOLOGY | Facility: HOSPITAL | Age: 64
Discharge: HOME | End: 2024-08-09
Payer: COMMERCIAL

## 2024-08-09 ENCOUNTER — LAB (OUTPATIENT)
Dept: LAB | Facility: LAB | Age: 64
End: 2024-08-09
Payer: COMMERCIAL

## 2024-08-09 DIAGNOSIS — C61 PROSTATE CA (MULTI): ICD-10-CM

## 2024-08-09 DIAGNOSIS — I10 PRIMARY HYPERTENSION: ICD-10-CM

## 2024-08-09 LAB
ALBUMIN SERPL BCP-MCNC: 4 G/DL (ref 3.4–5)
ALP SERPL-CCNC: 80 U/L (ref 33–136)
ALT SERPL W P-5'-P-CCNC: 11 U/L (ref 10–52)
ANION GAP SERPL CALC-SCNC: 11 MMOL/L (ref 10–20)
AST SERPL W P-5'-P-CCNC: 12 U/L (ref 9–39)
BASOPHILS # BLD AUTO: 0.11 X10*3/UL (ref 0–0.1)
BASOPHILS NFR BLD AUTO: 1.9 %
BILIRUB SERPL-MCNC: 0.5 MG/DL (ref 0–1.2)
BUN SERPL-MCNC: 20 MG/DL (ref 6–23)
CALCIUM SERPL-MCNC: 9 MG/DL (ref 8.6–10.3)
CHLORIDE SERPL-SCNC: 106 MMOL/L (ref 98–107)
CO2 SERPL-SCNC: 25 MMOL/L (ref 21–32)
CREAT SERPL-MCNC: 0.82 MG/DL (ref 0.5–1.3)
EGFRCR SERPLBLD CKD-EPI 2021: >90 ML/MIN/1.73M*2
EOSINOPHIL # BLD AUTO: 0.5 X10*3/UL (ref 0–0.7)
EOSINOPHIL NFR BLD AUTO: 8.5 %
ERYTHROCYTE [DISTWIDTH] IN BLOOD BY AUTOMATED COUNT: 12.4 % (ref 11.5–14.5)
GLUCOSE SERPL-MCNC: 98 MG/DL (ref 74–99)
HCT VFR BLD AUTO: 43.6 % (ref 41–52)
HGB BLD-MCNC: 14.4 G/DL (ref 13.5–17.5)
IMM GRANULOCYTES # BLD AUTO: 0.02 X10*3/UL (ref 0–0.7)
IMM GRANULOCYTES NFR BLD AUTO: 0.3 % (ref 0–0.9)
LYMPHOCYTES # BLD AUTO: 1.29 X10*3/UL (ref 1.2–4.8)
LYMPHOCYTES NFR BLD AUTO: 22 %
MCH RBC QN AUTO: 30.4 PG (ref 26–34)
MCHC RBC AUTO-ENTMCNC: 33 G/DL (ref 32–36)
MCV RBC AUTO: 92 FL (ref 80–100)
MONOCYTES # BLD AUTO: 0.57 X10*3/UL (ref 0.1–1)
MONOCYTES NFR BLD AUTO: 9.7 %
NEUTROPHILS # BLD AUTO: 3.37 X10*3/UL (ref 1.2–7.7)
NEUTROPHILS NFR BLD AUTO: 57.6 %
NRBC BLD-RTO: 0 /100 WBCS (ref 0–0)
PLATELET # BLD AUTO: 294 X10*3/UL (ref 150–450)
POTASSIUM SERPL-SCNC: 4.3 MMOL/L (ref 3.5–5.3)
PROT SERPL-MCNC: 6.8 G/DL (ref 6.4–8.2)
PSA SERPL-MCNC: <0.01 NG/ML
RBC # BLD AUTO: 4.74 X10*6/UL (ref 4.5–5.9)
SODIUM SERPL-SCNC: 138 MMOL/L (ref 136–145)
TESTOST SERPL-MCNC: <30 NG/DL (ref 240–1000)
WBC # BLD AUTO: 5.9 X10*3/UL (ref 4.4–11.3)

## 2024-08-09 PROCEDURE — 75571 CT HRT W/O DYE W/CA TEST: CPT

## 2024-08-09 PROCEDURE — 85025 COMPLETE CBC W/AUTO DIFF WBC: CPT

## 2024-08-09 PROCEDURE — 36415 COLL VENOUS BLD VENIPUNCTURE: CPT

## 2024-08-09 PROCEDURE — 84403 ASSAY OF TOTAL TESTOSTERONE: CPT

## 2024-08-09 PROCEDURE — 84153 ASSAY OF PSA TOTAL: CPT

## 2024-08-09 PROCEDURE — 80053 COMPREHEN METABOLIC PANEL: CPT

## 2024-08-13 ENCOUNTER — TELEPHONE (OUTPATIENT)
Dept: ADMISSION | Facility: HOSPITAL | Age: 64
End: 2024-08-13
Payer: COMMERCIAL

## 2024-08-13 DIAGNOSIS — I71.21 ANEURYSM OF ASCENDING AORTA WITHOUT RUPTURE (CMS-HCC): Primary | ICD-10-CM

## 2024-08-13 NOTE — TELEPHONE ENCOUNTER
Left the pt a VM the team will hold off on refilling for now because the plan is that he will likely come off treatment. I asked him to call back with any questions.

## 2024-08-13 NOTE — TELEPHONE ENCOUNTER
Pt called- he has an appt with Janel on Thursday. However, he said he only has enough Zytiga to last until Thursday. Pt said he is coming up on 2 years of therapy so he thought there was a chance he would be done with Zytiga on Thursday but wasn't sure. He said he doesn't want to get a refill of it if the team is going to discontinue. Message sent to the team.

## 2024-08-13 NOTE — PROGRESS NOTES
CT calcium scoring suggestive of aneurysmal thoracic ascending aorta, recommend MRA of chest and referral to caridiology. Orders placed.

## 2024-08-14 ENCOUNTER — SPECIALTY PHARMACY (OUTPATIENT)
Dept: PHARMACY | Facility: CLINIC | Age: 64
End: 2024-08-14

## 2024-08-15 ENCOUNTER — APPOINTMENT (OUTPATIENT)
Dept: HEMATOLOGY/ONCOLOGY | Facility: CLINIC | Age: 64
End: 2024-08-15
Payer: COMMERCIAL

## 2024-08-15 ENCOUNTER — OFFICE VISIT (OUTPATIENT)
Dept: HEMATOLOGY/ONCOLOGY | Facility: CLINIC | Age: 64
End: 2024-08-15
Payer: COMMERCIAL

## 2024-08-15 VITALS
DIASTOLIC BLOOD PRESSURE: 81 MMHG | RESPIRATION RATE: 16 BRPM | BODY MASS INDEX: 29.54 KG/M2 | TEMPERATURE: 97.5 F | HEART RATE: 67 BPM | SYSTOLIC BLOOD PRESSURE: 118 MMHG | OXYGEN SATURATION: 98 % | WEIGHT: 217.8 LBS

## 2024-08-15 DIAGNOSIS — Z79.818 ANDROGEN DEPRIVATION THERAPY: ICD-10-CM

## 2024-08-15 DIAGNOSIS — R23.2 HOT FLASH IN MALE: ICD-10-CM

## 2024-08-15 DIAGNOSIS — C61 PROSTATE CA (MULTI): Primary | ICD-10-CM

## 2024-08-15 DIAGNOSIS — R53.83 OTHER FATIGUE: ICD-10-CM

## 2024-08-15 PROCEDURE — 99215 OFFICE O/P EST HI 40 MIN: CPT | Performed by: STUDENT IN AN ORGANIZED HEALTH CARE EDUCATION/TRAINING PROGRAM

## 2024-08-15 PROCEDURE — 3074F SYST BP LT 130 MM HG: CPT | Performed by: STUDENT IN AN ORGANIZED HEALTH CARE EDUCATION/TRAINING PROGRAM

## 2024-08-15 PROCEDURE — 3079F DIAST BP 80-89 MM HG: CPT | Performed by: STUDENT IN AN ORGANIZED HEALTH CARE EDUCATION/TRAINING PROGRAM

## 2024-08-15 ASSESSMENT — PAIN SCALES - GENERAL: PAINLEVEL: 0-NO PAIN

## 2024-08-15 NOTE — PROGRESS NOTES
Oncology Return Visit      Patient ID: Arvind Cabrera is a 64 y.o. male who presents for follow up of prostate cancer  Primary Care Provider: ONEIL Bustamante-CNP    Patient Timeline    Date  Event    9/2021  PSA 3.2     10/13/21 Prostate biopsy - [place] grade group 2, involving 35% of the specimen     12/20/21 Prostatectomy - [place] grade group 2, tertiary pattern 5, 0/6 lymph nodes positive, staged as pT3aN0, +EPE, +PNI, -SVI, - LVI. Tumor microns from lateral margin     2/1/22  PSA myles at 0.10     8/5/22  PSA 0.18     8/19/22 PSMA PET shows lesion at T11     8/26/22 MR spine shows enhancement at T9 and T11     9/9/22  PSA 0.24     9/15/22 Start ADT     10/6/22 Start abiraterone and prednisone     11/2/22 PSA 0.13, completed radiation with Dr. Nunez     3/6/23  PSA undetectable. C/o worsening back pain, MRI reveals treated lesions and unchanged area of edema     8/24/23 Abiraterone dose decreased to 750 mg    8/15/24 Discontinue abiraterone, ADT     Cancer Staging   Prostate CA (Multi)  Staging form: Prostate, AJCC 8th Edition  - Clinical: Stage IVB (cTX, cNX, cM1b) - Signed by Lenard Ware MD PhD on 8/15/2024    [facility]    Arvind Cabrera presents accompanied by his wife. He reports that he continues to have fatigue and hot flashes, which were worse this summer with the hot weather. He does think that oxybutynin helped with the hot flashes. He currently does not have any pain. He says that he was recently diagnosed with an aortic aneurysm. He reports no fevers, chills, night sweats, dyspnea, chest pain, abdominal pain, nausea, vomiting, diarrhea, constipation, extremity weakness, neuropathy, skin changes/rash, easy bleeding/bruising, vision changes, or headaches.    ROS    A 14 point review of systems was performed and is negative unless otherwise stated in the [facility]    Past Medical History:   Diagnosis Date    Anesthesia of skin     Numbness and tingling    Pain in left shoulder 10/13/2020     Shoulder pain, left    Personal history of other specified conditions 2021    History of elevated prostate specific antigen (PSA)        Medications    Current Outpatient Medications   Medication Instructions    amLODIPine (Norvasc) 5 mg tablet TAKE 2 TABLETS DAILY.    losartan (Cozaar) 50 mg tablet TAKE 1 TABLET BY MOUTH 2 TIMES A DAY    oxybutynin (DITROPAN) 5 mg, oral, 2 times daily        Fam Hx    No family history on file.    Social Hx    Lives in [place] with his wife. 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 Never smoker. No heavy alcohol or recreational drug use.    Objective     Physical Examination    Vitals  /81 (BP Location: Left arm, Patient Position: Sitting)   Pulse 67   Temp 36.4 °C (97.5 °F) (Temporal)   Resp 16   Wt 98.8 kg (217 lb 12.8 oz)   SpO2 98%   BMI 29.54 kg/m²   BSA: 2.24 meters squared    Performance Status:  Asymptomatic (ECO)    Physical Exam    GENERAL: Well appearing, in no apparent distress  CV: Regular rate and rhythm, Normal S1, S2, no murmurs/rubs/gallops  RESP: Normal work of breathing, CTAB without wheeze or crackles  ABD: Normoactive bowel sounds. Soft, nontender, nondistended.  EXT: Warm and well perfused, no edema  NEURO: A&O x 3, normal gait  SKIN: No visible rashes or bruising.  PSYCH: Normal affect.    Results    Labs  Lab Results   Component Value Date    WBC 5.9 2024    HGB 14.4 2024    HCT 43.6 2024    MCV 92 2024     2024     Lab Results   Component Value Date    GLUCOSE 98 2024    CALCIUM 9.0 2024     2024    K 4.3 2024    CO2 25 2024     2024    BUN 20 2024    CREATININE 0.82 2024     Lab Results   Component Value Date    ALT 11 2024    AST 12 2024    ALKPHOS 80 2024    BILITOT 0.5 2024      Lab Results   Component Value Date    TSH 1.03 2024        PSA Trend  Lab  Results   Component Value Date    PSA <0.01 08/09/2024    PSA <0.01 05/15/2024    PSA <0.01 02/16/2024        Imaging    Imaging personally reviewed in EHR and summarized in prior notes. No new imaging.    Genomics    Germline:  Negative    Somatic: TMPRSS2-ERG chromosomal rearrangement    Assessment/Plan    Arvind Cabrera is a 64 y.o. year old male diagnosed with high risk ([place] GG2, pT3a) prostate adenocarcinoma in 2021 and underwent prostatectomy. At the time, he had some high risk features including EPE and close margins. In August 2022, he was found to have residual PSA and underwent PSMA PET imaging, which revealed a lesion at T11. He had no other sites of disease on imaging. He completed radiation to T11 and did not receive pelvic XRT. He received 2 years of ADT and abiraterone/prednisone with PSA response to undetectable. Abiraterone dose was decreased to 750 mg for fatigue and hot flashes.    At this time, he has completed 2 years of systemic therapy for oligometastatic disease. He will discontinue treatment and start PSA surveillance. We discussed that testosterone recovery should occur naturally and that testosterone supplementation would not be recommended given risk of recurrent disease.    # Oligometastatic prostate cancer  - S/p radiation to T11  - Discontinue ADT and abiraterone/prednisone  - PSA and testosterone in 3 months    # Hot flashes  - Did not tolerate venlafaxine  - Continue oxybutynin 5 mg PO BID until testosterone recovery  - Continue acupuncture    # Fatigue  - Continue activity  - Abiraterone dose previously reduced to 750 mg    # Back pain  - Continue with orthopedics    # Dry mouth  # Dry eye  - Hydration  - Lubricating eye drops    # Bone Health  - Continue calcium and vitamin D, at least 1000 units each daily  - Continue regular weight bearing physical activity    # HTN  - Closely monitor BP  - Continue to follow with cardiology     # Urinary Incontinence  - Follows with  urology (Dr. Alberts)    The above plan was discussed with the patient and family, and they were in agreement. All questions were answered to their satisfaction.    RV 3 months with labs (CBC, CMP, PSA, Testosterone)    More than 45 minutes were spent in face-to-face encounter, review of medical records, coordination of care, and documentation.

## 2024-08-30 ENCOUNTER — SPECIALTY PHARMACY (OUTPATIENT)
Dept: PHARMACY | Facility: CLINIC | Age: 64
End: 2024-08-30

## 2024-09-09 ENCOUNTER — PHARMACY VISIT (OUTPATIENT)
Dept: PHARMACY | Facility: CLINIC | Age: 64
End: 2024-09-09
Payer: COMMERCIAL

## 2024-09-09 PROCEDURE — RXMED WILLOW AMBULATORY MEDICATION CHARGE

## 2024-09-10 ENCOUNTER — PHARMACY VISIT (OUTPATIENT)
Dept: PHARMACY | Facility: CLINIC | Age: 64
End: 2024-09-10
Payer: COMMERCIAL

## 2024-09-10 PROCEDURE — RXMED WILLOW AMBULATORY MEDICATION CHARGE

## 2024-09-12 ENCOUNTER — HOSPITAL ENCOUNTER (OUTPATIENT)
Dept: RADIOLOGY | Facility: HOSPITAL | Age: 64
Discharge: HOME | End: 2024-09-12
Payer: COMMERCIAL

## 2024-09-12 DIAGNOSIS — I71.21 ANEURYSM OF ASCENDING AORTA WITHOUT RUPTURE (CMS-HCC): ICD-10-CM

## 2024-09-12 PROCEDURE — 2550000001 HC RX 255 CONTRASTS

## 2024-09-12 PROCEDURE — A9575 INJ GADOTERATE MEGLUMI 0.1ML: HCPCS

## 2024-09-12 PROCEDURE — 71555 MRI ANGIO CHEST W OR W/O DYE: CPT

## 2024-09-12 RX ORDER — GADOTERATE MEGLUMINE 376.9 MG/ML
20 INJECTION INTRAVENOUS
Status: COMPLETED | OUTPATIENT
Start: 2024-09-12 | End: 2024-09-12

## 2024-09-16 ENCOUNTER — APPOINTMENT (OUTPATIENT)
Dept: RADIOLOGY | Facility: HOSPITAL | Age: 64
End: 2024-09-16
Payer: COMMERCIAL

## 2024-09-17 DIAGNOSIS — Z15.89 ENCOUNTER FOR SCREENING FOR ABDOMINAL AORTIC ANEURYSM (AAA) IN PATIENT WITH GENETIC PREDISPOSITION TO AAA: Primary | ICD-10-CM

## 2024-09-17 DIAGNOSIS — Z13.6 ENCOUNTER FOR SCREENING FOR ABDOMINAL AORTIC ANEURYSM (AAA) IN PATIENT WITH GENETIC PREDISPOSITION TO AAA: Primary | ICD-10-CM

## 2024-09-18 ENCOUNTER — APPOINTMENT (OUTPATIENT)
Dept: CARDIOLOGY | Facility: CLINIC | Age: 64
End: 2024-09-18
Payer: COMMERCIAL

## 2024-09-18 ENCOUNTER — OFFICE VISIT (OUTPATIENT)
Dept: CARDIOLOGY | Facility: CLINIC | Age: 64
End: 2024-09-18
Payer: COMMERCIAL

## 2024-09-18 VITALS
WEIGHT: 218.6 LBS | OXYGEN SATURATION: 96 % | HEART RATE: 65 BPM | HEIGHT: 72 IN | DIASTOLIC BLOOD PRESSURE: 90 MMHG | BODY MASS INDEX: 29.61 KG/M2 | SYSTOLIC BLOOD PRESSURE: 126 MMHG

## 2024-09-18 DIAGNOSIS — Z13.6 ENCOUNTER FOR SCREENING FOR ABDOMINAL AORTIC ANEURYSM (AAA) IN PATIENT WITH GENETIC PREDISPOSITION TO AAA: ICD-10-CM

## 2024-09-18 DIAGNOSIS — I71.21 ANEURYSM OF ASCENDING AORTA WITHOUT RUPTURE (CMS-HCC): ICD-10-CM

## 2024-09-18 DIAGNOSIS — I10 PRIMARY HYPERTENSION: ICD-10-CM

## 2024-09-18 DIAGNOSIS — Z15.89 ENCOUNTER FOR SCREENING FOR ABDOMINAL AORTIC ANEURYSM (AAA) IN PATIENT WITH GENETIC PREDISPOSITION TO AAA: ICD-10-CM

## 2024-09-18 PROCEDURE — 99214 OFFICE O/P EST MOD 30 MIN: CPT | Performed by: STUDENT IN AN ORGANIZED HEALTH CARE EDUCATION/TRAINING PROGRAM

## 2024-09-18 PROCEDURE — 3008F BODY MASS INDEX DOCD: CPT | Performed by: STUDENT IN AN ORGANIZED HEALTH CARE EDUCATION/TRAINING PROGRAM

## 2024-09-18 PROCEDURE — 3074F SYST BP LT 130 MM HG: CPT | Performed by: STUDENT IN AN ORGANIZED HEALTH CARE EDUCATION/TRAINING PROGRAM

## 2024-09-18 PROCEDURE — 3080F DIAST BP >= 90 MM HG: CPT | Performed by: STUDENT IN AN ORGANIZED HEALTH CARE EDUCATION/TRAINING PROGRAM

## 2024-09-18 NOTE — PROGRESS NOTES
Referred by Mayra Ramirez, APRN-C*  Chief complaint:   Chief Complaint   Patient presents with    Aneurysm of ascending aorta     NPV        History of Present Illness  Arvind Cabrera is a 64 y.o. year old male patient with history of hypertension who is presenting for evaluation of incidental finding of enlarged aorta in CT calcium score.    Patient had a screening coronary calcium score that showed a calcium score of 11, but did show aneurysmal ascending thoracic aorta measuring 5.1 cm.  He is asymptomatic.  Denies chest pain, shortness of breath, palpitations or syncope.  Prostate cancer - radiation/surgery 3 years ago    No family history of aneurysms -   Grandfather -  in his 50s suddenly  Father had valve replacement in his 80s      Social History     Tobacco Use    Smoking status: Never     Passive exposure: Never    Smokeless tobacco: Never   Substance Use Topics    Alcohol use: Yes    Drug use: Never       Outpatient Medications:  Current Outpatient Medications   Medication Instructions    amLODIPine (Norvasc) 5 mg tablet TAKE 2 TABLETS DAILY.    losartan (Cozaar) 50 mg tablet TAKE 1 TABLET BY MOUTH 2 TIMES A DAY    oxybutynin (DITROPAN) 5 mg, oral, 2 times daily         Vitals:  Vitals:    24 1135   BP: 126/90   Pulse: 65   SpO2: 96%       Physical Exam:  General: NAD, well-appearing  HEENT: moist mucous membranes, no jaundice  Neck: No JVD, no carotid bruit  Lungs: CTA lissa, no wheezing or rales  Cardiac: RRR, no murmurs  Abdomen: soft, non-tender, non-distended  Extremities: 2+ radial pulses, no edema, no wounds  Skin: warm, dry  Neurologic: AAOx3,  no focal deficits        Assessment/Plan       # Thoracic ascending aorta aneurysm  -CT calcium score reviewed showing approximately 5.1 cm TAA.  Also had MRA that showed mid ascending thoracic aorta was measuring about 5 cm, without evidence of acute pathology.  Aortic valve also appeared to be trileaflet with mild regurgitation  -Will  obtain echocardiogram for further evaluation  -Further plans after echocardiogram    # Hypertension  -Discussed importance of blood pressure control and keeping blood pressure log for further titration  -Currently on amlodipine and Cozaar      Marga Winston MD [facility]  Interventional Cardiology  Endovascular Interventions  [email or web address]    **Disclaimer: This note was dictated by speech recognition, and every effort has been made to prevent any error in transcription, however minor errors may be present**

## 2024-09-27 ENCOUNTER — HOSPITAL ENCOUNTER (OUTPATIENT)
Dept: CARDIOLOGY | Facility: HOSPITAL | Age: 64
Discharge: HOME | End: 2024-09-27
Payer: COMMERCIAL

## 2024-09-27 DIAGNOSIS — I71.21 ANEURYSM OF ASCENDING AORTA WITHOUT RUPTURE (CMS-HCC): ICD-10-CM

## 2024-09-27 LAB
AORTIC VALVE MEAN GRADIENT: 6 MMHG
AORTIC VALVE PEAK VELOCITY: 1.77 M/S
AV PEAK GRADIENT: 12.5 MMHG
AVA (PEAK VEL): 2.99 CM2
AVA (VTI): 2.53 CM2
EJECTION FRACTION APICAL 4 CHAMBER: 66
EJECTION FRACTION: 64 %
LEFT ATRIUM VOLUME AREA LENGTH INDEX BSA: 12.8 ML/M2
LEFT VENTRICLE INTERNAL DIMENSION DIASTOLE: 4.47 CM (ref 3.5–6)
LEFT VENTRICULAR OUTFLOW TRACT DIAMETER: 2.1 CM
LV EJECTION FRACTION BIPLANE: 64 %
MITRAL VALVE E/A RATIO: 0.78
RIGHT VENTRICLE PEAK SYSTOLIC PRESSURE: 30.5 MMHG
TRICUSPID ANNULAR PLANE SYSTOLIC EXCURSION: 3 CM

## 2024-09-27 PROCEDURE — 93306 TTE W/DOPPLER COMPLETE: CPT | Performed by: STUDENT IN AN ORGANIZED HEALTH CARE EDUCATION/TRAINING PROGRAM

## 2024-09-27 PROCEDURE — 93306 TTE W/DOPPLER COMPLETE: CPT

## 2024-10-08 DIAGNOSIS — C61 PROSTATE CA (MULTI): ICD-10-CM

## 2024-10-08 PROCEDURE — RXMED WILLOW AMBULATORY MEDICATION CHARGE

## 2024-10-08 RX ORDER — OXYBUTYNIN CHLORIDE 5 MG/1
5 TABLET ORAL 2 TIMES DAILY
Qty: 60 TABLET | Refills: 2 | Status: SHIPPED | OUTPATIENT
Start: 2024-10-08 | End: 2025-10-08

## 2024-10-11 ENCOUNTER — APPOINTMENT (OUTPATIENT)
Dept: PRIMARY CARE | Facility: CLINIC | Age: 64
End: 2024-10-11
Payer: COMMERCIAL

## 2024-10-11 VITALS
WEIGHT: 221.6 LBS | HEIGHT: 72 IN | DIASTOLIC BLOOD PRESSURE: 67 MMHG | BODY MASS INDEX: 30.02 KG/M2 | SYSTOLIC BLOOD PRESSURE: 98 MMHG | HEART RATE: 73 BPM

## 2024-10-11 DIAGNOSIS — C61 PROSTATE CA (MULTI): ICD-10-CM

## 2024-10-11 DIAGNOSIS — I10 PRIMARY HYPERTENSION: ICD-10-CM

## 2024-10-11 PROCEDURE — 3008F BODY MASS INDEX DOCD: CPT

## 2024-10-11 PROCEDURE — 3074F SYST BP LT 130 MM HG: CPT

## 2024-10-11 PROCEDURE — 1036F TOBACCO NON-USER: CPT

## 2024-10-11 PROCEDURE — 99214 OFFICE O/P EST MOD 30 MIN: CPT

## 2024-10-11 PROCEDURE — 3078F DIAST BP <80 MM HG: CPT

## 2024-10-11 RX ORDER — OXYBUTYNIN CHLORIDE 5 MG/1
5 TABLET ORAL 2 TIMES DAILY
Qty: 60 TABLET | Refills: 2 | Status: SHIPPED | OUTPATIENT
Start: 2024-10-11 | End: 2025-01-09

## 2024-10-11 RX ORDER — AMLODIPINE BESYLATE 5 MG/1
10 TABLET ORAL DAILY
Qty: 180 TABLET | Refills: 3 | Status: SHIPPED | OUTPATIENT
Start: 2024-10-11 | End: 2025-10-11

## 2024-10-11 ASSESSMENT — ENCOUNTER SYMPTOMS
SHORTNESS OF BREATH: 0
CHILLS: 0
FEVER: 0
LIGHT-HEADEDNESS: 0
HEADACHES: 0
FATIGUE: 0
PALPITATIONS: 0
COUGH: 0

## 2024-10-11 NOTE — PROGRESS NOTES
Subjective   Patient ID: Geremias Cabrera is a 64 y.o. male who presents for Med Management.    HPI   Here today to discuss BP  Reports he checks it at home, reports he gets readings <130/90 at home.   Denies any lightheadedness, headaches, chest pain or SOB.  We did discuss taking the BP daily and calling if her is getting low or high readings as we would adjust his losartan.   Reports he stopped taking oxybutynin after he read side effects online. He did struggle with any side effects, just did not want to take it anymore. He does want to keep a refill as he mostly uses it for hot flashes, he would like to remain off for a month and then determine if he wants to resume the medication.   Review of Systems   Constitutional:  Negative for chills, fatigue and fever.   Respiratory:  Negative for cough and shortness of breath.    Cardiovascular:  Negative for chest pain, palpitations and leg swelling.   Neurological:  Negative for light-headedness and headaches.       Objective   BP 98/67 (Patient Position: Sitting)   Pulse 73   Ht 1.829 m (6')   Wt 101 kg (221 lb 9.6 oz)   BMI 30.05 kg/m²     Physical Exam  Cardiovascular:      Rate and Rhythm: Normal rate and regular rhythm.      Heart sounds: Normal heart sounds.   Pulmonary:      Breath sounds: Normal breath sounds.   Musculoskeletal:      Right lower leg: No edema.      Left lower leg: No edema.   Skin:     Capillary Refill: Capillary refill takes less than 2 seconds.   Neurological:      Mental Status: He is alert and oriented to person, place, and time.         Assessment/Plan   Problem List Items Addressed This Visit             ICD-10-CM    HTN (hypertension) I10    Relevant Medications    amLODIPine (Norvasc) 5 mg tablet    Prostate CA (Multi) C61    Relevant Medications    oxybutynin (Ditropan) 5 mg tablet     HTN  -Norvasc 10 mg   -losartan 50mg BID   -Continue home BP readings, if >140/90 or <100/60 he will call and possibly adjust losartan

## 2024-10-23 ENCOUNTER — APPOINTMENT (OUTPATIENT)
Dept: CARDIOLOGY | Facility: CLINIC | Age: 64
End: 2024-10-23
Payer: COMMERCIAL

## 2024-10-23 VITALS
WEIGHT: 222 LBS | BODY MASS INDEX: 30.07 KG/M2 | HEART RATE: 72 BPM | DIASTOLIC BLOOD PRESSURE: 58 MMHG | OXYGEN SATURATION: 96 % | HEIGHT: 72 IN | SYSTOLIC BLOOD PRESSURE: 90 MMHG

## 2024-10-23 DIAGNOSIS — I10 PRIMARY HYPERTENSION: ICD-10-CM

## 2024-10-23 DIAGNOSIS — I71.21 ANEURYSM OF ASCENDING AORTA WITHOUT RUPTURE (CMS-HCC): Primary | ICD-10-CM

## 2024-10-23 PROCEDURE — 3074F SYST BP LT 130 MM HG: CPT | Performed by: STUDENT IN AN ORGANIZED HEALTH CARE EDUCATION/TRAINING PROGRAM

## 2024-10-23 PROCEDURE — 3008F BODY MASS INDEX DOCD: CPT | Performed by: STUDENT IN AN ORGANIZED HEALTH CARE EDUCATION/TRAINING PROGRAM

## 2024-10-23 PROCEDURE — 99213 OFFICE O/P EST LOW 20 MIN: CPT | Performed by: STUDENT IN AN ORGANIZED HEALTH CARE EDUCATION/TRAINING PROGRAM

## 2024-10-23 PROCEDURE — 3078F DIAST BP <80 MM HG: CPT | Performed by: STUDENT IN AN ORGANIZED HEALTH CARE EDUCATION/TRAINING PROGRAM

## 2024-10-28 ENCOUNTER — APPOINTMENT (OUTPATIENT)
Dept: HEMATOLOGY/ONCOLOGY | Facility: CLINIC | Age: 64
End: 2024-10-28
Payer: COMMERCIAL

## 2024-10-30 ENCOUNTER — PHARMACY VISIT (OUTPATIENT)
Dept: PHARMACY | Facility: CLINIC | Age: 64
End: 2024-10-30
Payer: COMMERCIAL

## 2024-10-30 PROCEDURE — RXMED WILLOW AMBULATORY MEDICATION CHARGE

## 2024-11-06 ENCOUNTER — OFFICE VISIT (OUTPATIENT)
Dept: CARDIAC SURGERY | Facility: HOSPITAL | Age: 64
End: 2024-11-06
Payer: COMMERCIAL

## 2024-11-06 VITALS
OXYGEN SATURATION: 95 % | HEART RATE: 76 BPM | HEIGHT: 72 IN | SYSTOLIC BLOOD PRESSURE: 115 MMHG | WEIGHT: 223.4 LBS | BODY MASS INDEX: 30.26 KG/M2 | DIASTOLIC BLOOD PRESSURE: 78 MMHG

## 2024-11-06 DIAGNOSIS — R07.9 CHEST PAIN, UNSPECIFIED TYPE: ICD-10-CM

## 2024-11-06 DIAGNOSIS — I71.21 ANEURYSM OF ASCENDING AORTA WITHOUT RUPTURE (CMS-HCC): Primary | ICD-10-CM

## 2024-11-06 PROCEDURE — 3074F SYST BP LT 130 MM HG: CPT | Performed by: THORACIC SURGERY (CARDIOTHORACIC VASCULAR SURGERY)

## 2024-11-06 PROCEDURE — 3078F DIAST BP <80 MM HG: CPT | Performed by: THORACIC SURGERY (CARDIOTHORACIC VASCULAR SURGERY)

## 2024-11-06 PROCEDURE — 99205 OFFICE O/P NEW HI 60 MIN: CPT | Performed by: THORACIC SURGERY (CARDIOTHORACIC VASCULAR SURGERY)

## 2024-11-06 PROCEDURE — 3008F BODY MASS INDEX DOCD: CPT | Performed by: THORACIC SURGERY (CARDIOTHORACIC VASCULAR SURGERY)

## 2024-11-06 PROCEDURE — 99215 OFFICE O/P EST HI 40 MIN: CPT | Performed by: THORACIC SURGERY (CARDIOTHORACIC VASCULAR SURGERY)

## 2024-11-06 ASSESSMENT — PAIN SCALES - GENERAL: PAINLEVEL_OUTOF10: 0-NO PAIN

## 2024-11-11 ENCOUNTER — LAB (OUTPATIENT)
Dept: LAB | Facility: LAB | Age: 64
End: 2024-11-11
Payer: COMMERCIAL

## 2024-11-11 DIAGNOSIS — C61 PROSTATE CA (MULTI): ICD-10-CM

## 2024-11-11 LAB
ALBUMIN SERPL BCP-MCNC: 4.4 G/DL (ref 3.4–5)
ALP SERPL-CCNC: 84 U/L (ref 33–136)
ALT SERPL W P-5'-P-CCNC: 18 U/L (ref 10–52)
ANION GAP SERPL CALC-SCNC: 11 MMOL/L (ref 10–20)
AST SERPL W P-5'-P-CCNC: 18 U/L (ref 9–39)
BASOPHILS # BLD AUTO: 0.1 X10*3/UL (ref 0–0.1)
BASOPHILS NFR BLD AUTO: 1.7 %
BILIRUB SERPL-MCNC: 0.4 MG/DL (ref 0–1.2)
BUN SERPL-MCNC: 17 MG/DL (ref 6–23)
CALCIUM SERPL-MCNC: 9.7 MG/DL (ref 8.6–10.3)
CHLORIDE SERPL-SCNC: 104 MMOL/L (ref 98–107)
CO2 SERPL-SCNC: 27 MMOL/L (ref 21–32)
CREAT SERPL-MCNC: 0.89 MG/DL (ref 0.5–1.3)
EGFRCR SERPLBLD CKD-EPI 2021: >90 ML/MIN/1.73M*2
EOSINOPHIL # BLD AUTO: 0.52 X10*3/UL (ref 0–0.7)
EOSINOPHIL NFR BLD AUTO: 9.1 %
ERYTHROCYTE [DISTWIDTH] IN BLOOD BY AUTOMATED COUNT: 12.5 % (ref 11.5–14.5)
GLUCOSE SERPL-MCNC: 88 MG/DL (ref 74–99)
HCT VFR BLD AUTO: 46.7 % (ref 41–52)
HGB BLD-MCNC: 15.4 G/DL (ref 13.5–17.5)
IMM GRANULOCYTES # BLD AUTO: 0.01 X10*3/UL (ref 0–0.7)
IMM GRANULOCYTES NFR BLD AUTO: 0.2 % (ref 0–0.9)
LYMPHOCYTES # BLD AUTO: 1.15 X10*3/UL (ref 1.2–4.8)
LYMPHOCYTES NFR BLD AUTO: 20.1 %
MCH RBC QN AUTO: 30 PG (ref 26–34)
MCHC RBC AUTO-ENTMCNC: 33 G/DL (ref 32–36)
MCV RBC AUTO: 91 FL (ref 80–100)
MONOCYTES # BLD AUTO: 0.72 X10*3/UL (ref 0.1–1)
MONOCYTES NFR BLD AUTO: 12.6 %
NEUTROPHILS # BLD AUTO: 3.22 X10*3/UL (ref 1.2–7.7)
NEUTROPHILS NFR BLD AUTO: 56.3 %
NRBC BLD-RTO: 0 /100 WBCS (ref 0–0)
PLATELET # BLD AUTO: 351 X10*3/UL (ref 150–450)
POTASSIUM SERPL-SCNC: 4.3 MMOL/L (ref 3.5–5.3)
PROT SERPL-MCNC: 7 G/DL (ref 6.4–8.2)
PSA SERPL-MCNC: <0.01 NG/ML
RBC # BLD AUTO: 5.14 X10*6/UL (ref 4.5–5.9)
SODIUM SERPL-SCNC: 138 MMOL/L (ref 136–145)
WBC # BLD AUTO: 5.7 X10*3/UL (ref 4.4–11.3)

## 2024-11-11 PROCEDURE — 85025 COMPLETE CBC W/AUTO DIFF WBC: CPT

## 2024-11-11 PROCEDURE — 84403 ASSAY OF TOTAL TESTOSTERONE: CPT

## 2024-11-11 PROCEDURE — 80053 COMPREHEN METABOLIC PANEL: CPT

## 2024-11-11 PROCEDURE — 36415 COLL VENOUS BLD VENIPUNCTURE: CPT

## 2024-11-11 PROCEDURE — 84153 ASSAY OF PSA TOTAL: CPT

## 2024-11-12 LAB — TESTOST SERPL-MCNC: 293 NG/DL (ref 240–1000)

## 2024-11-12 NOTE — PROGRESS NOTES
History of Present Illness  Arvind Cabrera is a 64 y.o. year old male patient with history of hypertension who is presenting for evaluation of incidental finding of enlarged aorta in CT calcium score.  This patient was sent to me for aortic center evaluation.  The referral cardiologist Dr. Melissa Nj.     Patient had a screening coronary calcium score that showed a calcium score of 11, but did show aneurysmal ascending thoracic aorta measuring 5.1 cm.  He is asymptomatic.  Denies chest pain, shortness of breath, palpitations or syncope.  Prostate cancer - radiation/surgery 3 years ago     No family history of aneurysms -   Grandfather -  in his 50s suddenly  Father had valve replacement in his 80s        Social History   Social History            Tobacco Use    Smoking status: Never       Passive exposure: Never    Smokeless tobacco: Never   Substance Use Topics    Alcohol use: Yes    Drug use: Never            Outpatient Medications:       Current Outpatient Medications   Medication Instructions    amLODIPine (Norvasc) 5 mg tablet TAKE 2 TABLETS DAILY.    losartan (Cozaar) 50 mg tablet TAKE 1 TABLET BY MOUTH 2 TIMES A DAY    oxybutynin (DITROPAN) 5 mg, oral, 2 times daily    oxybutynin (DITROPAN) 5 mg, oral, 2 times daily         Vitals:      Vitals:     10/23/24 1511   BP: 90/58   Pulse: 72   SpO2: 96%         Physical Exam:  General: NAD, well-appearing  HEENT: moist mucous membranes, no jaundice  Neck: No JVD, no carotid bruit  Lungs: CTA lissa, no wheezing or rales  Cardiac: RRR, no murmurs  Abdomen: soft, non-tender, non-distended  Extremities: 2+ radial pulses, no edema, no wounds  Skin: warm, dry  Neurologic: AAOx3,  no focal deficits           Assessment/Plan   After thorough conversation with the patient and with his wife through the phone I offer him to be part of the Titan trial and eventually be randomized to either surgery or surveillance.  I have explained to him that a patient  with a 5.1 cm ascending aorta has an indication for surgery under our protocol.  The patient is going marinated on this and discuss with his wife further to make a final decision.     # Thoracic ascending aorta aneurysm  -CT calcium score reviewed showing approximately 5.1 cm TAA.  Also had MRA that showed mid ascending thoracic aorta was measuring about 5 cm, without evidence of acute pathology.  Aortic valve also appeared to be trileaflet with mild regurgitation  -Echocardiogram with trileaflet aortic valve and no regurgitation or stenosis       # Hypertension  -Discussed importance of blood pressure control and keeping blood pressure log for further titration  -Currently on amlodipine and Cozaar

## 2024-11-14 ENCOUNTER — OFFICE VISIT (OUTPATIENT)
Dept: HEMATOLOGY/ONCOLOGY | Facility: CLINIC | Age: 64
End: 2024-11-14
Payer: COMMERCIAL

## 2024-11-14 VITALS
DIASTOLIC BLOOD PRESSURE: 76 MMHG | HEART RATE: 69 BPM | RESPIRATION RATE: 16 BRPM | OXYGEN SATURATION: 96 % | BODY MASS INDEX: 30.39 KG/M2 | SYSTOLIC BLOOD PRESSURE: 113 MMHG | WEIGHT: 224.1 LBS | TEMPERATURE: 96.8 F

## 2024-11-14 DIAGNOSIS — C61 PROSTATE CA (MULTI): ICD-10-CM

## 2024-11-14 PROCEDURE — 99215 OFFICE O/P EST HI 40 MIN: CPT | Performed by: NURSE PRACTITIONER

## 2024-11-14 PROCEDURE — 3074F SYST BP LT 130 MM HG: CPT | Performed by: NURSE PRACTITIONER

## 2024-11-14 PROCEDURE — 3078F DIAST BP <80 MM HG: CPT | Performed by: NURSE PRACTITIONER

## 2024-11-14 ASSESSMENT — PAIN SCALES - GENERAL: PAINLEVEL_OUTOF10: 0-NO PAIN

## 2024-11-14 NOTE — PROGRESS NOTES
"Patient ID: Arvind Cabrera \"Ubaldo" is a 64 y.o. male.  Attending Physician: Dr. Lneard Ware  Cancer Diagnosis:  Cancer Staging   Prostate CA (Multi)  Staging form: Prostate, AJCC 8th Edition  - Clinical: Stage IVB (cTX, cNX, cM1b) - Signed by Lenard Ware MD PhD on 8/15/2024     Current Therapy: ADT (trelstar q12 weeks)  Abiraterone Acetate 750 mg PO daily  Prednisone 5 mg PO daily    Genetics:   xT  TMPRSS2 - ERG fusion  HRR not detected  MIS-S  TMB 0.0    Subjective      Cancer History:  Oncology History   Prostate CA (Multi)   10/30/2023 Initial Diagnosis    Prostate CA (Multi)     8/15/2024 Cancer Staged    Staging form: Prostate, AJCC 8th Edition, Clinical: Stage IVB (cTX, cNX, cM1b) - Signed by Lenard Ware MD PhD on 8/15/2024       9/2021  PSA 3.2     10/13/21 Prostate biopsy - [place] grade group 2, involving 35% of the specimen     12/20/21 Prostatectomy - [place] grade group 2, tertiary pattern 5, 0/6 lymph nodes positive, staged as pT3aN0, +EPE, +PNI, -SVI, - LVI. Tumor microns from lateral margin     2/1/22  PSA myles at 0.10     8/5/22  PSA 0.18     8/19/22 PSMA PET shows lesion at T11     8/26/22 MR spine shows enhancement at T9 and T11     9/9/22  PSA 0.24     9/15/22 Start ADT     10/6/22  Start abiraterone and prednisone     11/2/22 PSA 0.13, completed radiation with Dr. Nunez     12/7/22 PSA 0.03     3/6/23  PSA undetectable. C/o worsening back pain, MRI reveals treated lesions and unchanged area of edema     5/31/23 PSA undetectable     8/24/23 Abiraterone dose decreased to 750 mg    8/15/24 Discontinue abiraterone, ADT     11/11/24 PSA <0.1, T 293    Interval History:  Mr. Cabrera feels well overall. Still has some fatigue and hot flashes. He has been seeing cardiology for aneurysm, and is considering surgery vs surveillance. He otherwise has been urinating less at night. No new or concerning symptoms otherwise. The remainder of his ROS is otherwise negative.    HPI    Objective    BSA: There " is no height or weight on file to calculate BSA.  There were no vitals taken for this visit.    Physical Exam  General: alert, well-dressed in NAD. Speech is fluent and coherent, words clear. Good insight. Oriented x4  Skin: warm, dry, and pink without cyanosis or nail clubbing. No rash, petechiae, or ecchymoses  HEENT: Normocephalic atraumatic. Sclera white, conjunctiva pink. EOMs intact. Hearing intact to spoken voice. No visible lesions  Respiratory: Chest expansion symmetric. No audible wheeze. Unlabored breathing.  CV: Good color  Psych: engaged, polite, appropriate conversation and eye contact.      Current Medications:    Current Outpatient Medications:     amLODIPine (Norvasc) 5 mg tablet, TAKE 2 TABLETS DAILY., Disp: 180 tablet, Rfl: 3    losartan (Cozaar) 50 mg tablet, TAKE 1 TABLET BY MOUTH 2 TIMES A DAY, Disp: 180 tablet, Rfl: 3    oxybutynin (Ditropan) 5 mg tablet, Take 1 tablet (5 mg) by mouth 2 times a day., Disp: 60 tablet, Rfl: 2    oxybutynin (Ditropan) 5 mg tablet, Take 1 tablet (5 mg) by mouth 2 times a day., Disp: 60 tablet, Rfl: 2     Most Recent Labs:  Results for orders placed or performed in visit on 11/11/24   Prostate Specific Antigen    Collection Time: 11/11/24 10:32 AM   Result Value Ref Range    Prostate Specific AG <0.01 <=4.00 ng/mL   Testosterone    Collection Time: 11/11/24 10:32 AM   Result Value Ref Range    Testosterone 293 240 - 1,000 ng/dL   CBC and Auto Differential    Collection Time: 11/11/24 10:32 AM   Result Value Ref Range    WBC 5.7 4.4 - 11.3 x10*3/uL    nRBC 0.0 0.0 - 0.0 /100 WBCs    RBC 5.14 4.50 - 5.90 x10*6/uL    Hemoglobin 15.4 13.5 - 17.5 g/dL    Hematocrit 46.7 41.0 - 52.0 %    MCV 91 80 - 100 fL    MCH 30.0 26.0 - 34.0 pg    MCHC 33.0 32.0 - 36.0 g/dL    RDW 12.5 11.5 - 14.5 %    Platelets 351 150 - 450 x10*3/uL    Neutrophils % 56.3 40.0 - 80.0 %    Immature Granulocytes %, Automated 0.2 0.0 - 0.9 %    Lymphocytes % 20.1 13.0 - 44.0 %    Monocytes % 12.6 2.0  - 10.0 %    Eosinophils % 9.1 0.0 - 6.0 %    Basophils % 1.7 0.0 - 2.0 %    Neutrophils Absolute 3.22 1.20 - 7.70 x10*3/uL    Immature Granulocytes Absolute, Automated 0.01 0.00 - 0.70 x10*3/uL    Lymphocytes Absolute 1.15 (L) 1.20 - 4.80 x10*3/uL    Monocytes Absolute 0.72 0.10 - 1.00 x10*3/uL    Eosinophils Absolute 0.52 0.00 - 0.70 x10*3/uL    Basophils Absolute 0.10 0.00 - 0.10 x10*3/uL   Comprehensive Metabolic Panel    Collection Time: 11/11/24 10:32 AM   Result Value Ref Range    Glucose 88 74 - 99 mg/dL    Sodium 138 136 - 145 mmol/L    Potassium 4.3 3.5 - 5.3 mmol/L    Chloride 104 98 - 107 mmol/L    Bicarbonate 27 21 - 32 mmol/L    Anion Gap 11 10 - 20 mmol/L    Urea Nitrogen 17 6 - 23 mg/dL    Creatinine 0.89 0.50 - 1.30 mg/dL    eGFR >90 >60 mL/min/1.73m*2    Calcium 9.7 8.6 - 10.3 mg/dL    Albumin 4.4 3.4 - 5.0 g/dL    Alkaline Phosphatase 84 33 - 136 U/L    Total Protein 7.0 6.4 - 8.2 g/dL    AST 18 9 - 39 U/L    Bilirubin, Total 0.4 0.0 - 1.2 mg/dL    ALT 18 10 - 52 U/L      Lab Results   Component Value Date    PSA <0.01 11/11/2024    PSA <0.01 08/09/2024    PSA <0.01 05/15/2024        Performance Status:  ECOG Score: 0- Fully active, able to carry on all pre-disease performance w/o restriction.  Karnofsky Score: 90 - Able to carry on normal activity; minor signs or symptoms of disease       Assessment/Plan   Arvind Cabrera is a 64 y.o. male with oligometastatic prostate cancer who presents in follow up on ADT and AA/P (dose reduced).    He was diagnosed with [id number] prostate adenocarcinoma in 2021 and underwent prostatectomy. At the time, he had some high risk features including  EPE and close margins. In August 2022, he was found to have residual PSA and underwent PSMA PET imaging, which revealed a lesion at T11. He had no other sites of disease on imaging. He completed radiation to T11 and did not receive pelvic XRT. Therapy was discontinued in August 2024.     PSA remains undetectable, T  now up to normal, baseline 313 now 293. Will continue surveillance.  # Oligometastatic prostate cancer  - S/p radiation to T11  - Off ADT and abiraterone/prednisone  - PSA and testosterone in 3 months    # Aneurysm  - Continue to follow with cardiology     # Hot flashes  - Did not tolerate venlafaxine  - Off oxybutynin d/t T recovery  - Continue acupuncture     # Fatigue  - Continue activity  - Abiraterone dose previously reduced to 750 mg     # Back pain  - Continue with orthopedics     # Dry mouth  # Dry eye  - Hydration  - Lubricating eye drops     # Bone Health  - Calcium/D no longer needed  - Continue regular weight bearing physical activity     # HTN  - Closely monitor BP  - Continue to follow with cardiology     # Urinary Incontinence  - Follows with urology (Dr. Alberts)    RTC 3 months for repeat PSA/T    Total time spent on this encounter was 45 minutes, which included preparation, direct time with patient, documentation, and care coordination on the day of visit.    Janel Burkett, MSN, APRN, AGNP-C, AOCNP  Associate Nurse Practitioner  [facility]

## 2024-11-19 PROBLEM — M47.816 SPONDYLOSIS WITHOUT MYELOPATHY OR RADICULOPATHY, LUMBAR REGION: Status: RESOLVED | Noted: 2022-08-26 | Resolved: 2024-11-19

## 2024-11-19 PROBLEM — M25.559 ARTHRALGIA OF HIP: Status: ACTIVE | Noted: 2023-10-30

## 2024-11-19 PROBLEM — I10 HYPERTENSION: Status: ACTIVE | Noted: 2023-02-17

## 2024-11-19 PROBLEM — M19.012 ARTHRITIS OF LEFT GLENOHUMERAL JOINT: Status: ACTIVE | Noted: 2023-03-17

## 2024-11-19 PROBLEM — R20.0 NUMBNESS AND TINGLING SENSATION OF SKIN: Status: RESOLVED | Noted: 2024-11-19 | Resolved: 2024-11-19

## 2024-11-19 PROBLEM — C61 MALIGNANT NEOPLASM OF PROSTATE (MULTI): Status: ACTIVE | Noted: 2023-03-17

## 2024-11-19 PROBLEM — R20.2 NUMBNESS AND TINGLING SENSATION OF SKIN: Status: RESOLVED | Noted: 2024-11-19 | Resolved: 2024-11-19

## 2024-11-19 PROBLEM — R97.20 HIGH PROSTATE SPECIFIC ANTIGEN (PSA): Status: RESOLVED | Noted: 2023-02-17 | Resolved: 2024-11-19

## 2024-11-19 PROBLEM — G56.00 CARPAL TUNNEL SYNDROME: Status: RESOLVED | Noted: 2024-11-19 | Resolved: 2024-11-19

## 2024-11-19 PROBLEM — Z85.46 HISTORY OF PROSTATE CANCER: Status: RESOLVED | Noted: 2023-03-17 | Resolved: 2024-11-19

## 2024-11-19 PROBLEM — T81.9XXA POSTOPERATIVE COMPLICATION: Status: RESOLVED | Noted: 2024-11-19 | Resolved: 2024-11-19

## 2024-11-19 NOTE — PROGRESS NOTES
Geremias returns to discuss treatment recommendations for dilated aorta. Alberta Underwood RN  Is a 64-year-old male patient who has already been in my clinic before.  He was diagnosed with a 5.1 cm ascending aorta.  The first time I met him he was with his wife through the phone.  His wife works in our hospital.  So she is very knowledgeable about this issues.  So today we have the same discussion about options.  We talked about Titan trial, surgery, surveillance.  They still want to marginate on this a little bit longer and then they will discuss with us about the next steps.

## 2024-11-27 ENCOUNTER — OFFICE VISIT (OUTPATIENT)
Dept: CARDIAC SURGERY | Facility: HOSPITAL | Age: 64
End: 2024-11-27
Payer: COMMERCIAL

## 2024-11-27 VITALS
DIASTOLIC BLOOD PRESSURE: 69 MMHG | HEART RATE: 68 BPM | HEIGHT: 72 IN | OXYGEN SATURATION: 97 % | BODY MASS INDEX: 30.34 KG/M2 | SYSTOLIC BLOOD PRESSURE: 100 MMHG | WEIGHT: 224 LBS

## 2024-11-27 DIAGNOSIS — I77.819 DILATATION OF AORTA (CMS-HCC): ICD-10-CM

## 2024-11-27 PROCEDURE — 3074F SYST BP LT 130 MM HG: CPT | Performed by: THORACIC SURGERY (CARDIOTHORACIC VASCULAR SURGERY)

## 2024-11-27 PROCEDURE — 99215 OFFICE O/P EST HI 40 MIN: CPT | Performed by: THORACIC SURGERY (CARDIOTHORACIC VASCULAR SURGERY)

## 2024-11-27 PROCEDURE — 3008F BODY MASS INDEX DOCD: CPT | Performed by: THORACIC SURGERY (CARDIOTHORACIC VASCULAR SURGERY)

## 2024-11-27 PROCEDURE — 3078F DIAST BP <80 MM HG: CPT | Performed by: THORACIC SURGERY (CARDIOTHORACIC VASCULAR SURGERY)

## 2024-11-27 ASSESSMENT — ENCOUNTER SYMPTOMS
DEPRESSION: 0
OCCASIONAL FEELINGS OF UNSTEADINESS: 0
LOSS OF SENSATION IN FEET: 0

## 2024-11-27 ASSESSMENT — PAIN SCALES - GENERAL: PAINLEVEL_OUTOF10: 0-NO PAIN

## 2024-11-27 ASSESSMENT — PATIENT HEALTH QUESTIONNAIRE - PHQ9
2. FEELING DOWN, DEPRESSED OR HOPELESS: NOT AT ALL
1. LITTLE INTEREST OR PLEASURE IN DOING THINGS: NOT AT ALL
SUM OF ALL RESPONSES TO PHQ9 QUESTIONS 1 AND 2: 0

## 2024-12-17 ENCOUNTER — PATIENT MESSAGE (OUTPATIENT)
Dept: HEMATOLOGY/ONCOLOGY | Facility: CLINIC | Age: 64
End: 2024-12-17
Payer: COMMERCIAL

## 2024-12-18 DIAGNOSIS — I71.21 ANEURYSM OF ASCENDING AORTA WITHOUT RUPTURE (CMS-HCC): Primary | ICD-10-CM

## 2024-12-19 DIAGNOSIS — I71.9 AORTIC ANEURYSM (CMS-HCC): Primary | ICD-10-CM

## 2025-01-08 ENCOUNTER — APPOINTMENT (OUTPATIENT)
Dept: CARDIAC SURGERY | Facility: HOSPITAL | Age: 65
End: 2025-01-08
Payer: COMMERCIAL

## 2025-01-14 ENCOUNTER — PHARMACY VISIT (OUTPATIENT)
Dept: PHARMACY | Facility: CLINIC | Age: 65
End: 2025-01-14
Payer: COMMERCIAL

## 2025-01-14 PROCEDURE — RXMED WILLOW AMBULATORY MEDICATION CHARGE

## 2025-01-24 ENCOUNTER — HOSPITAL ENCOUNTER (OUTPATIENT)
Facility: HOSPITAL | Age: 65
Setting detail: OUTPATIENT SURGERY
Discharge: HOME | End: 2025-01-24
Payer: COMMERCIAL

## 2025-01-24 ENCOUNTER — DOCUMENTATION (OUTPATIENT)
Dept: CARDIOLOGY | Facility: HOSPITAL | Age: 65
End: 2025-01-24
Payer: COMMERCIAL

## 2025-01-24 VITALS
OXYGEN SATURATION: 99 % | HEIGHT: 72 IN | HEART RATE: 62 BPM | SYSTOLIC BLOOD PRESSURE: 112 MMHG | RESPIRATION RATE: 13 BRPM | BODY MASS INDEX: 29.8 KG/M2 | DIASTOLIC BLOOD PRESSURE: 73 MMHG | WEIGHT: 220 LBS

## 2025-01-24 DIAGNOSIS — I71.21 ANEURYSM OF ASCENDING AORTA WITHOUT RUPTURE (CMS-HCC): ICD-10-CM

## 2025-01-24 DIAGNOSIS — I71.20 THORACIC AORTIC ANEURYSM, WITHOUT RUPTURE, UNSPECIFIED (CMS-HCC): ICD-10-CM

## 2025-01-24 DIAGNOSIS — I71.9 AORTIC ANEURYSM (CMS-HCC): Primary | ICD-10-CM

## 2025-01-24 LAB
ANION GAP SERPL CALC-SCNC: 12 MMOL/L (ref 10–20)
BUN SERPL-MCNC: 24 MG/DL (ref 6–23)
CALCIUM SERPL-MCNC: 9.5 MG/DL (ref 8.6–10.6)
CHLORIDE SERPL-SCNC: 106 MMOL/L (ref 98–107)
CO2 SERPL-SCNC: 26 MMOL/L (ref 21–32)
CREAT SERPL-MCNC: 0.8 MG/DL (ref 0.5–1.3)
EGFRCR SERPLBLD CKD-EPI 2021: >90 ML/MIN/1.73M*2
ERYTHROCYTE [DISTWIDTH] IN BLOOD BY AUTOMATED COUNT: 12.4 % (ref 11.5–14.5)
GLUCOSE SERPL-MCNC: 94 MG/DL (ref 74–99)
HCT VFR BLD AUTO: 46.7 % (ref 41–52)
HGB BLD-MCNC: 15.7 G/DL (ref 13.5–17.5)
MCH RBC QN AUTO: 30 PG (ref 26–34)
MCHC RBC AUTO-ENTMCNC: 33.6 G/DL (ref 32–36)
MCV RBC AUTO: 89 FL (ref 80–100)
NRBC BLD-RTO: 0 /100 WBCS (ref 0–0)
PLATELET # BLD AUTO: 332 X10*3/UL (ref 150–450)
POTASSIUM SERPL-SCNC: 4.6 MMOL/L (ref 3.5–5.3)
RBC # BLD AUTO: 5.23 X10*6/UL (ref 4.5–5.9)
SODIUM SERPL-SCNC: 139 MMOL/L (ref 136–145)
WBC # BLD AUTO: 5 X10*3/UL (ref 4.4–11.3)

## 2025-01-24 PROCEDURE — 7100000009 HC PHASE TWO TIME - INITIAL BASE CHARGE

## 2025-01-24 PROCEDURE — 93454 CORONARY ARTERY ANGIO S&I: CPT

## 2025-01-24 PROCEDURE — 7100000010 HC PHASE TWO TIME - EACH INCREMENTAL 1 MINUTE

## 2025-01-24 PROCEDURE — 36415 COLL VENOUS BLD VENIPUNCTURE: CPT

## 2025-01-24 PROCEDURE — 99153 MOD SED SAME PHYS/QHP EA: CPT

## 2025-01-24 PROCEDURE — C1760 CLOSURE DEV, VASC: HCPCS

## 2025-01-24 PROCEDURE — C1894 INTRO/SHEATH, NON-LASER: HCPCS

## 2025-01-24 PROCEDURE — 2720000007 HC OR 272 NO HCPCS

## 2025-01-24 PROCEDURE — 2500000004 HC RX 250 GENERAL PHARMACY W/ HCPCS (ALT 636 FOR OP/ED)

## 2025-01-24 PROCEDURE — 99152 MOD SED SAME PHYS/QHP 5/>YRS: CPT

## 2025-01-24 PROCEDURE — 82374 ASSAY BLOOD CARBON DIOXIDE: CPT

## 2025-01-24 PROCEDURE — 96373 THER/PROPH/DIAG INJ IA: CPT | Mod: 59

## 2025-01-24 PROCEDURE — 2550000001 HC RX 255 CONTRASTS

## 2025-01-24 PROCEDURE — 85027 COMPLETE CBC AUTOMATED: CPT

## 2025-01-24 RX ORDER — MIDAZOLAM HYDROCHLORIDE 1 MG/ML
INJECTION, SOLUTION INTRAMUSCULAR; INTRAVENOUS AS NEEDED
Status: DISCONTINUED | OUTPATIENT
Start: 2025-01-24 | End: 2025-01-24 | Stop reason: HOSPADM

## 2025-01-24 RX ORDER — HEPARIN SODIUM 1000 [USP'U]/ML
INJECTION, SOLUTION INTRAVENOUS; SUBCUTANEOUS AS NEEDED
Status: DISCONTINUED | OUTPATIENT
Start: 2025-01-24 | End: 2025-01-24 | Stop reason: HOSPADM

## 2025-01-24 RX ORDER — LIDOCAINE HYDROCHLORIDE 20 MG/ML
INJECTION, SOLUTION INFILTRATION; PERINEURAL AS NEEDED
Status: DISCONTINUED | OUTPATIENT
Start: 2025-01-24 | End: 2025-01-24 | Stop reason: HOSPADM

## 2025-01-24 RX ORDER — FENTANYL CITRATE 50 UG/ML
INJECTION, SOLUTION INTRAMUSCULAR; INTRAVENOUS AS NEEDED
Status: DISCONTINUED | OUTPATIENT
Start: 2025-01-24 | End: 2025-01-24 | Stop reason: HOSPADM

## 2025-01-24 ASSESSMENT — ENCOUNTER SYMPTOMS
CONSTITUTIONAL NEGATIVE: 1
MUSCULOSKELETAL NEGATIVE: 1
SHORTNESS OF BREATH: 0
NEUROLOGICAL NEGATIVE: 1
GASTROINTESTINAL NEGATIVE: 1

## 2025-01-24 NOTE — PROGRESS NOTES
Arvind H Cabrera Is enrolled in [facility] Registry for DR. Parisa ALDRIDGE Registry eligibility criteria     Patient ID                               Site number [id number]    Inclusion Criteria   Patients >=18 years of age Patients with ascending aortic aneurysm >=5.0cm who are either not eligible or not willing to participate in the TITAN Randomized Controlled Trial     Exclusion Criteria   Patients who are unable to give informed consent    Patients who are unable to attend for regular follow-up/ remain compliant with protocol    Patients who will be undergoing aortic or cardiac surgery and have an ascending aortic aneurysm >=5.5cm       The above Subject has meet all the inclusion and Exclusion criteria.   For any question please call Laurel Tilley at [phone]

## 2025-01-24 NOTE — H&P
"History Of Present Illness  Arvind Cabrera \"Geremias\" is a 64 y.o. male presenting for elective L & RHC with Dr. Monroe for presurgical evaluation prior to surgical repair of dilated 5.1 cm ascending aorta with Dr Mccauley.    Mr. Cabrera has a PMHx of HTN, and Prostate CA, s/p radiation/surgery 3 years ago. Patient had a screening coronary calcium score that showed a calcium score of 11, but did show aneurysmal ascending thoracic aorta measuring 5.1 cm. He is asymptomatic.  Denies chest pain, shortness of breath, palpitations or syncope. Patient seen and assessed pre-procedure. Allergies and current medications reviewed. NPO since midnight, all questions answered. Comfortable on RA, able to lay flat. Labs collected, pending review.     No family history of aneurysms -   Grandfather -  in his 50s suddenly  Father had valve replacement in his 80s     Past Medical History  Past Medical History:   Diagnosis Date    Anesthesia of skin     Numbness and tingling    Carpal tunnel syndrome 2024    High prostate specific antigen (PSA) 2023    History of prostate cancer 2023    Numbness and tingling sensation of skin 2024    Pain in left shoulder 10/13/2020    Shoulder pain, left    Personal history of other specified conditions 2021    History of elevated prostate specific antigen (PSA)    Postoperative complication 2024    Spondylosis without myelopathy or radiculopathy, lumbar region 2022       Surgical History  Past Surgical History:   Procedure Laterality Date    OTHER SURGICAL HISTORY  2020    Carpal tunnel surgery    OTHER SURGICAL HISTORY  2021    Robotic-assisted laparoscopic radical prostatectomy        Social History  He reports that he has never smoked. He has never been exposed to tobacco smoke. He has never used smokeless tobacco. He reports current alcohol use. He reports that he does not use drugs.    Family History  Family History   Problem Relation Name " Age of Onset    Other (heart valve replacement) Father      Heart attack Paternal Grandfather          Allergies  Patient has no known allergies.    Review of Systems   Constitutional: Negative.    HENT: Negative.     Respiratory:  Negative for shortness of breath.    Cardiovascular:  Negative for chest pain.   Gastrointestinal: Negative.    Genitourinary: Negative.    Musculoskeletal: Negative.    Neurological: Negative.         Physical Exam  Vitals reviewed.   Constitutional:       General: He is not in acute distress.     Appearance: Normal appearance.   HENT:      Head: Atraumatic.      Mouth/Throat:      Mouth: Mucous membranes are moist.   Eyes:      Extraocular Movements: Extraocular movements intact.      Conjunctiva/sclera: Conjunctivae normal.   Cardiovascular:      Rate and Rhythm: Normal rate and regular rhythm.      Pulses: Normal pulses.      Heart sounds: Normal heart sounds.   Pulmonary:      Effort: Pulmonary effort is normal. No respiratory distress.      Breath sounds: Normal breath sounds.   Abdominal:      General: Bowel sounds are normal.      Palpations: Abdomen is soft.   Musculoskeletal:         General: Normal range of motion.      Cervical back: Normal range of motion.      Right lower leg: No edema.      Left lower leg: No edema.   Skin:     General: Skin is warm and dry.   Neurological:      General: No focal deficit present.      Mental Status: He is alert and oriented to person, place, and time.   Psychiatric:         Mood and Affect: Mood normal.         Behavior: Behavior normal.          Last Recorded Vitals  Height 1.829 m (6'), weight 99.8 kg (220 lb).    Relevant Results           Assessment/Plan   Assessment & Plan  Aortic aneurysm (CMS-HCC)  - had a screening coronary calcium score that showed a calcium score of 11, but did show aneurysmal ascending thoracic aorta measuring 5.1 cm  - He is asymptomatic, denies chest pain, shortness of breath, palpitations or syncope.   - plan  for elective L & RHC with Dr. Monroe for presurgical evaluation prior to surgical repair of dilated ascending aorta with Dr Mccauley on 3/7/25.           I spent 30 minutes in the professional and overall care of this patient.      Filipe Joshi, APRN-CNP

## 2025-01-24 NOTE — Clinical Note
Sheath was removed in the right radial artery. Ultrasound guidance was used. Site closed by TR-Band. 15mL Patient

## 2025-01-24 NOTE — PRE-SEDATION DOCUMENTATION
Sedation Plan    ASA 3     Mallampati class: I.    Risks, benefits, and alternatives discussed with patient and spouse.    Pt NPO since midnight (sip with meds), all questions answered. No previous issues with anesthesia.

## 2025-01-24 NOTE — DISCHARGE INSTRUCTIONS
CARDIAC CATHETERIZATION DISCHARGE INSTRUCTIONS (procedure done on 1/24/2025). Please follow up as previously instructed by Dr. Mccauley's office.    Your medications have not changed. Please continue take you medications as prescribed. It was a pleasure to have met and care for you!

## 2025-01-24 NOTE — ASSESSMENT & PLAN NOTE
- had a screening coronary calcium score that showed a calcium score of 11, but did show aneurysmal ascending thoracic aorta measuring 5.1 cm  - He is asymptomatic, denies chest pain, shortness of breath, palpitations or syncope.   - plan for elective L & RHC with Dr. Monroe for presurgical evaluation prior to surgical repair of dilated ascending aorta with Dr Mccauley on 3/7/25.

## 2025-01-24 NOTE — POST-PROCEDURE NOTE
Physician Transition of Care Summary  Invasive Cardiovascular Lab    Procedure Date: 1/24/2025  Attending:    * Santi Monroe - Primary  Resident/Fellow/Other Assistant: Surgeons and Role:     * Parminder Baldwin MD - Fellow     * Mario Steen MD - Fellow    Indications:   Pre-op Diagnosis      * Aortic aneurysm (CMS-HCC) [I71.9]    Post-procedure diagnosis:   Post-op Diagnosis     * Aortic aneurysm (CMS-HCC) [I71.9]    Procedure(s):   Left & Right Heart Cath w Angiography & LV  99950 - TN R & L HRT CATH WINJX HRT ART& L VENTR IMG        Procedure Findings:   Right dominant system  Non obstructive CAD  LM- <10%  LAD- <10%  lcX- <10%  Rca<10% m(there was catheter induced spasm seen angiographically), patient did not have any CP or ECG changes and the pressure did not dampen    Description of the Procedure:   RRA  6Fr  TR band    Complications:   Nil     Stents/Implants:   Implants       No implant documentation for this case.            Anticoagulation/Antiplatelet Plan:       Estimated Blood Loss:   5 mL    Anesthesia: Moderate Sedation Anesthesia Staff: No anesthesia staff entered.    Any Specimen(s) Removed:   Order Name Source Comment Collection Info Order Time   BASIC METABOLIC PANEL Blood, Venous  Collected By: Jared Ni RN 1/24/2025  7:03 AM     Release result to [facility]   Immediate        CBC Blood, Venous  Collected By: Jared Ni RN 1/24/2025  7:03 AM     Release result to MyChart   Immediate            Disposition:   Home later today      Electronically signed by: Mario Steen MD, 1/24/2025 10:24 AM

## 2025-01-27 DIAGNOSIS — I71.20 THORACIC AORTIC ANEURYSM WITHOUT RUPTURE, UNSPECIFIED PART (CMS-HCC): ICD-10-CM

## 2025-02-07 ENCOUNTER — HOSPITAL ENCOUNTER (OUTPATIENT)
Dept: RADIOLOGY | Facility: HOSPITAL | Age: 65
Discharge: HOME | End: 2025-02-07
Payer: COMMERCIAL

## 2025-02-07 ENCOUNTER — PHARMACY VISIT (OUTPATIENT)
Dept: PHARMACY | Facility: CLINIC | Age: 65
End: 2025-02-07
Payer: COMMERCIAL

## 2025-02-07 DIAGNOSIS — I71.20 THORACIC AORTIC ANEURYSM WITHOUT RUPTURE, UNSPECIFIED PART (CMS-HCC): ICD-10-CM

## 2025-02-07 PROCEDURE — 71275 CT ANGIOGRAPHY CHEST: CPT

## 2025-02-07 PROCEDURE — RXMED WILLOW AMBULATORY MEDICATION CHARGE

## 2025-02-07 PROCEDURE — 2550000001 HC RX 255 CONTRASTS: Performed by: THORACIC SURGERY (CARDIOTHORACIC VASCULAR SURGERY)

## 2025-02-07 RX ADMIN — IOHEXOL 90 ML: 350 INJECTION, SOLUTION INTRAVENOUS at 10:13

## 2025-02-14 ENCOUNTER — LAB (OUTPATIENT)
Dept: LAB | Facility: HOSPITAL | Age: 65
End: 2025-02-14
Payer: COMMERCIAL

## 2025-02-14 ENCOUNTER — TELEPHONE (OUTPATIENT)
Dept: PRIMARY CARE | Facility: CLINIC | Age: 65
End: 2025-02-14
Payer: COMMERCIAL

## 2025-02-14 ENCOUNTER — PHARMACY VISIT (OUTPATIENT)
Dept: PHARMACY | Facility: CLINIC | Age: 65
End: 2025-02-14
Payer: COMMERCIAL

## 2025-02-14 DIAGNOSIS — J20.9 ACUTE BRONCHITIS, UNSPECIFIED ORGANISM: Primary | ICD-10-CM

## 2025-02-14 DIAGNOSIS — C61 PROSTATE CA (MULTI): ICD-10-CM

## 2025-02-14 PROCEDURE — 84153 ASSAY OF PSA TOTAL: CPT

## 2025-02-14 PROCEDURE — RXMED WILLOW AMBULATORY MEDICATION CHARGE

## 2025-02-14 PROCEDURE — 84403 ASSAY OF TOTAL TESTOSTERONE: CPT

## 2025-02-14 RX ORDER — AZITHROMYCIN 500 MG/1
500 TABLET, FILM COATED ORAL DAILY
Qty: 5 TABLET | Refills: 0 | Status: SHIPPED | OUTPATIENT
Start: 2025-02-14 | End: 2025-02-19

## 2025-02-14 NOTE — TELEPHONE ENCOUNTER
Pt called in asking for a zpak - has had URI/Bronchitis sx since Saturday and it's not getting better. Would like it sent to [facility] if possible.

## 2025-02-15 LAB
PSA SERPL-MCNC: <0.1 NG/ML
TESTOST SERPL-MCNC: 199 NG/DL (ref 240–1000)

## 2025-02-19 ENCOUNTER — TELEPHONE (OUTPATIENT)
Dept: HEMATOLOGY/ONCOLOGY | Facility: HOSPITAL | Age: 65
End: 2025-02-19
Payer: COMMERCIAL

## 2025-02-19 NOTE — TELEPHONE ENCOUNTER
RN returned patient's phone call. Patient reports he currently has bronchitis and his wife is also sick ,doesn't want to make the drive to [email or web address]. RN changed patient's appointment to virtual per patient request. Labs were completed Friday 2/14/25. Patient agreeable to Mikala Golden CNP calling him in the morning.   Radha Garber RN 02/19/25 6:10 PM

## 2025-02-20 ENCOUNTER — APPOINTMENT (OUTPATIENT)
Dept: PREADMISSION TESTING | Facility: HOSPITAL | Age: 65
End: 2025-02-20
Payer: COMMERCIAL

## 2025-02-20 ENCOUNTER — TELEMEDICINE (OUTPATIENT)
Dept: HEMATOLOGY/ONCOLOGY | Facility: CLINIC | Age: 65
End: 2025-02-20
Payer: COMMERCIAL

## 2025-02-20 DIAGNOSIS — C61 PROSTATE CANCER (MULTI): Primary | ICD-10-CM

## 2025-02-20 PROCEDURE — 1036F TOBACCO NON-USER: CPT | Performed by: NURSE PRACTITIONER

## 2025-02-20 PROCEDURE — 99214 OFFICE O/P EST MOD 30 MIN: CPT | Performed by: NURSE PRACTITIONER

## 2025-02-20 NOTE — CPM/PAT H&P
"CPM/PAT Evaluation       Name: Arvind Cabrera (Arvind Cabrera \"Geremias\")  /Age: 1960/64 y.o.     {[facility] Visit Type:67069}      Chief Complaint: ***    HPI    Arvind Cabrera is scheduled for REPAIR, AORTA, ASCENDING THORACIC on 25    **Data Input  Past Medical History:   Diagnosis Date    Anesthesia of skin     Numbness and tingling    Carpal tunnel syndrome 2024    High prostate specific antigen (PSA) 2023    History of prostate cancer 2023    Hypertension     Numbness and tingling sensation of skin 2024    Pain in left shoulder 10/13/2020    Shoulder pain, left    Personal history of other specified conditions 2021    History of elevated prostate specific antigen (PSA)    Postoperative complication 2024    Spondylosis without myelopathy or radiculopathy, lumbar region 2022       Past Surgical History:   Procedure Laterality Date    CARDIAC CATHETERIZATION N/A 2025    Procedure: Left & Right Heart Cath w Angiography & LV;  Surgeon: Santi Monroe MD;  Location: [place] [address] Cardiac Cath Lab;  Service: Cardiovascular;  Laterality: N/A;  request  or     OTHER SURGICAL HISTORY  2020    Carpal tunnel surgery    OTHER SURGICAL HISTORY  2021    Robotic-assisted laparoscopic radical prostatectomy       Patient  has no history on file for sexual activity.    Family History   Problem Relation Name Age of Onset    Other (heart valve replacement) Father      Heart attack Paternal Grandfather         No Known Allergies    Prior to Admission medications    Medication Sig Start Date End Date Taking? Authorizing Provider   amLODIPine (Norvasc) 5 mg tablet TAKE 2 TABLETS DAILY. 10/11/24 10/11/25  ONEIL Bustamante-CNP   azithromycin (Zithromax) 500 mg tablet Take 1 tablet (500 mg) by mouth once daily for 5 days. 25  DUNIA Bustamante   losartan (Cozaar) 50 mg tablet TAKE 1 TABLET BY MOUTH 2 TIMES A DAY 24 " 25  ONEIL Bustamante-CNP        PAT ROS     PAT Physical Exam     Airway    Testing/Diagnostic:       - EK23        - Echo:24  CONCLUSIONS:   1. Left ventricular ejection fraction is normal, calculated by Ruffin's biplane at 64%.   2. Spectral Doppler shows an impaired relaxation pattern of left ventricular diastolic filling.   3. There is normal right ventricular global systolic function.   4. There is moderate dilatation of the ascending aorta.   There is trace mitral valve regurgitation.    There is trace tricuspid regurgitation.       - Cardiac Cath: 25  CONCLUSIONS:   1. No evidence of obstructive coronary artery disease in a right dominant system.       - CT Chest: 25  IMPRESSION:  1.  Stable aneurysmal dilatation of the thoracic ascending aorta  measuring 5.0 cm.. Recommend 1 year aortic surveillance with contrast chest CT/MR and continued surveillance with cardiothoracic surgery.  2. There is no evidence for acute aortic pathology.  3. Few scattered prominent pulmonary nodules, likely favoring benign etiology.      - CT Cardiac Scorin24  IMPRESSION:  1. Coronary artery calcium score of 11.75*.      2. Aneurysmal ascending thoracic aorta measuring 5.1 cm as maximal diameter. Recommend CTA and or MRA of the chest within 3 months for further characterization. Recommend echocardiography for assessment of the aortic valve.          - PET CT: 22  IMPRESSION:  1. Status post prostatectomy, no PET evidence of residual or  recurrent disease at the postsurgical bed.  2. Focal F 18 PSMA avid lesion within the T11 vertebral body abutting the endplate, suspicious for osseous metastatic disease. Attention on follow-up study.  3. No PET evidence of gustabo metastasis.       - Urea: 24 on 25        Patient Specialist/PCP:       Cardiology: Caroline Nj 10/23/24 evaluation of incidental finding of enlarged aorta in CT calcium score. 24 coronary calcium  score that showed a calcium score of 11, but did show aneurysmal ascending thoracic aorta measuring 5.1 cm.  He is asymptomatic.        PCP: Mayra Lopez CNP 10/11/24 presents for Med Management       Onc: Janeljarrell Burkett CNP 11/14/24 follow up on ADT and AA/P (dose reduced). Prostate adenocarcinoma dx in 2021 and underwent prostatectomy. In August 2022, he was found to have residual PSA and underwent PSMA PET imaging, which revealed a lesion at T11. He completed radiation to T11 and did not receive pelvic XRT. Therapy was discontinued in August 2024.    **PSA remains undetectable, T now up to normal, baseline 313 now 293. Will continue surveillance.  # Oligometastatic prostate cancer  - S/p radiation to T11  - Off ADT and abiraterone/prednisone  - PSA and testosterone in 3 months       Urology: Lenard Alberts 08/25/22 follow-up. Recently had a robotic prostatectomy. Now has a PSA recurrence of 0.18. This prompted a PMSA scan. Scan reveals an area of increased uptake in T11.     Cardiac Sx: Anastacio Mccauley 11/27/24 presents to discuss treatment recommendations for dilated aorta (5.1 cm ascending aorta).     We talked about Titan trial, surgery, surveillance. They still want to marginate on this a little bit longer and then they will discuss with us about the next steps.         _____________________________________________________  **Data input only. No medications, history or providers verified              Tiffany Henry LPN  Preadmission Testing            Visit Vitals  Smoking Status Never       DASI Risk Score    No data to display       Caprini DVT Assessment    No data to display       Modified Frailty Index    No data to display       CHADS2 Stroke Risk  Current as of 46 minutes ago        N/A 3 to 100%: High Risk   2 to < 3%: Medium Risk   0 to < 2%: Low Risk     Last Change: N/A          This score determines the patient's risk of having a stroke if the patient has atrial fibrillation.        This score is  not applicable to this patient. Components are not calculated.          Revised Cardiac Risk Index    No data to display       Apfel Simplified Score    No data to display       Risk Analysis Index Results This Encounter    No data found in the last 10 encounters.       Prodigy: High Risk  Total Score: 16              Prodigy Age Score      Prodigy Gender Score          ARISCAT Score for Postoperative Pulmonary Complications    No data to display       Jones Perioperative Risk for Myocardial Infarction or Cardiac Arrest (VLADIMIR)    No data to display         Assessment and Plan:     {[facility] EMBEDDED ASSESSMENT AND PLAN:27563}

## 2025-02-20 NOTE — PROGRESS NOTES
"Patient ID: Arvind Cabrera \"Ubaldo" is a 64 y.o. male.  Attending Physician: Dr. Lenard Ware  Cancer Diagnosis:  Cancer Staging   Malignant neoplasm of prostate (Multi)  Staging form: Prostate, AJCC 8th Edition  - Clinical: Stage IVB (cTX, cNX, cM1b) - Signed by Lenard Ware MD PhD on 8/15/2024     Current Therapy: ADT (trelstar q12 weeks)  Abiraterone Acetate 750 mg PO daily  Prednisone 5 mg PO daily    Genetics:   xT  TMPRSS2 - ERG fusion  HRR not detected  MIS-S  TMB 0.0    Subjective      Cancer History:  Oncology History   Malignant neoplasm of prostate (Multi)   10/30/2023 Initial Diagnosis    Prostate CA (Multi)     8/15/2024 Cancer Staged    Staging form: Prostate, AJCC 8th Edition, Clinical: Stage IVB (cTX, cNX, cM1b) - Signed by Lenard Ware MD PhD on 8/15/2024       9/2021  PSA 3.2     10/13/21 Prostate biopsy - Suzette grade group 2, involving 35% of the specimen     12/20/21 Prostatectomy - Suzette grade group 2, tertiary pattern 5, 0/6 lymph nodes positive, staged as pT3aN0, +EPE, +PNI, -SVI, - LVI. Tumor microns from lateral margin     2/1/22  PSA myles at 0.10     8/5/22  PSA 0.18     8/19/22 PSMA PET shows lesion at T11     8/26/22 MR spine shows enhancement at T9 and T11     9/9/22  PSA 0.24     9/15/22 Start ADT     10/6/22  Start abiraterone and prednisone     11/2/22 PSA 0.13, completed radiation with Dr. Nunez     12/7/22 PSA 0.03     3/6/23  PSA undetectable. C/o worsening back pain, MRI reveals treated lesions and unchanged area of edema     5/31/23 PSA undetectable     8/24/23 Abiraterone dose decreased to 750 mg    8/15/24 Discontinue abiraterone, ADT     11/11/24 PSA <0.1, T 293    2/20/25  PSA <0.1, T 199     Interval History:  Mr. Cabrera feels well overall. Still has some fatigue, wondering if this will further improve when his testosterone increases. He has been seeing cardiology for aneurysm, will have surgery in March. He otherwise has been urinating less at night. No new or " concerning symptoms otherwise. The remainder of his ROS is otherwise negative.      Objective    BSA: There is no height or weight on file to calculate BSA.  There were no vitals taken for this visit.    Physical Exam  No exam since phone visit:  A&O with clear and fluent speech.        Current Medications:    Current Outpatient Medications:     amLODIPine (Norvasc) 5 mg tablet, TAKE 2 TABLETS DAILY., Disp: 180 tablet, Rfl: 3    losartan (Cozaar) 50 mg tablet, TAKE 1 TABLET BY MOUTH 2 TIMES A DAY, Disp: 180 tablet, Rfl: 3     Most Recent Labs:  Results for orders placed or performed in visit on 02/14/25   Prostate Specific Antigen    Collection Time: 02/14/25  4:41 PM   Result Value Ref Range    Prostate Specific AG <0.10 <=4.00 ng/mL   Testosterone    Collection Time: 02/14/25  4:41 PM   Result Value Ref Range    Testosterone 199 (L) 240 - 1,000 ng/dL      Lab Results   Component Value Date    PSA <0.10 02/14/2025    PSA <0.01 11/11/2024    PSA <0.01 08/09/2024        Performance Status:  ECOG Score: 0- Fully active, able to carry on all pre-disease performance w/o restriction.  Karnofsky Score: 90 - Able to carry on normal activity; minor signs or symptoms of disease       Assessment/Plan   Arvind Cabrera is a 64 y.o. male with oligometastatic prostate cancer who presents in follow up on ADT and AA/P (dose reduced).    He was diagnosed with [id number] prostate adenocarcinoma in 2021 and underwent prostatectomy. At the time, he had some high risk features including  EPE and close margins. In August 2022, he was found to have residual PSA and underwent PSMA PET imaging, which revealed a lesion at T11. He had no other sites of disease on imaging. He completed radiation to T11 and did not receive pelvic XRT. Therapy was discontinued in August 2024.     PSA remains undetectable, T now recovered, baseline 313 now 199. Will continue surveillance.    # Oligometastatic prostate cancer  - S/p radiation to T11  - Off  ADT and abiraterone/prednisone  - PSA and testosterone in 3 months    # Aneurysm  - Continue to follow with cardiology     # Hot flashes  - Did not tolerate venlafaxine  - Off oxybutynin d/t T recovery  - Continue acupuncture     # Fatigue  - Continue activity  - Abiraterone dose previously reduced to 750 mg     # Back pain  - Continue with orthopedics     # Dry mouth  # Dry eye  - Hydration  - Lubricating eye drops     # Bone Health  - Calcium/D no longer needed  - Continue regular weight bearing physical activity     # HTN  - Closely monitor BP  - Continue to follow with cardiology     # Urinary Incontinence  - Follows with urology (Dr. Alberts)    RTC 3 months for repeat PSA/T    Total time spent on this encounter was 45 minutes, which included preparation, direct time with patient, documentation, and care coordination on the day of visit.    Patient verbalizes understanding of above plan. Time provided for patient's questions. Patient instructed to reach out for any new concerning issues.     Mikala Golden, MSN, APRN-CNP  Acute Care Nurse Practitioner   Genitourinary () Oncology  [facility]  Phone: [phone]

## 2025-02-28 ENCOUNTER — PRE-ADMISSION TESTING (OUTPATIENT)
Dept: PREADMISSION TESTING | Facility: HOSPITAL | Age: 65
End: 2025-02-28
Payer: COMMERCIAL

## 2025-02-28 VITALS
DIASTOLIC BLOOD PRESSURE: 86 MMHG | OXYGEN SATURATION: 98 % | HEART RATE: 69 BPM | HEIGHT: 72 IN | SYSTOLIC BLOOD PRESSURE: 117 MMHG | WEIGHT: 231 LBS | TEMPERATURE: 97.3 F | BODY MASS INDEX: 31.29 KG/M2

## 2025-02-28 DIAGNOSIS — Z01.818 PREOPERATIVE EXAMINATION: Primary | ICD-10-CM

## 2025-02-28 DIAGNOSIS — I71.21 ANEURYSM OF ASCENDING AORTA WITHOUT RUPTURE (CMS-HCC): ICD-10-CM

## 2025-02-28 LAB
ABO GROUP (TYPE) IN BLOOD: NORMAL
ANION GAP SERPL CALC-SCNC: 12 MMOL/L (ref 10–20)
ANTIBODY SCREEN: NORMAL
BUN SERPL-MCNC: 20 MG/DL (ref 6–23)
CALCIUM SERPL-MCNC: 9.2 MG/DL (ref 8.6–10.6)
CHLORIDE SERPL-SCNC: 104 MMOL/L (ref 98–107)
CO2 SERPL-SCNC: 28 MMOL/L (ref 21–32)
CREAT SERPL-MCNC: 0.87 MG/DL (ref 0.5–1.3)
EGFRCR SERPLBLD CKD-EPI 2021: >90 ML/MIN/1.73M*2
ERYTHROCYTE [DISTWIDTH] IN BLOOD BY AUTOMATED COUNT: 12.6 % (ref 11.5–14.5)
GLUCOSE SERPL-MCNC: 90 MG/DL (ref 74–99)
HCT VFR BLD AUTO: 45.2 % (ref 41–52)
HGB BLD-MCNC: 15.1 G/DL (ref 13.5–17.5)
MCH RBC QN AUTO: 30 PG (ref 26–34)
MCHC RBC AUTO-ENTMCNC: 33.4 G/DL (ref 32–36)
MCV RBC AUTO: 90 FL (ref 80–100)
NRBC BLD-RTO: 0 /100 WBCS (ref 0–0)
PLATELET # BLD AUTO: 352 X10*3/UL (ref 150–450)
POTASSIUM SERPL-SCNC: 4.5 MMOL/L (ref 3.5–5.3)
RBC # BLD AUTO: 5.04 X10*6/UL (ref 4.5–5.9)
RH FACTOR (ANTIGEN D): NORMAL
SODIUM SERPL-SCNC: 139 MMOL/L (ref 136–145)
WBC # BLD AUTO: 4.4 X10*3/UL (ref 4.4–11.3)

## 2025-02-28 PROCEDURE — 87081 CULTURE SCREEN ONLY: CPT

## 2025-02-28 PROCEDURE — 99244 OFF/OP CNSLTJ NEW/EST MOD 40: CPT

## 2025-02-28 PROCEDURE — 86901 BLOOD TYPING SEROLOGIC RH(D): CPT

## 2025-02-28 PROCEDURE — 85027 COMPLETE CBC AUTOMATED: CPT

## 2025-02-28 PROCEDURE — 36415 COLL VENOUS BLD VENIPUNCTURE: CPT

## 2025-02-28 PROCEDURE — RXMED WILLOW AMBULATORY MEDICATION CHARGE

## 2025-02-28 PROCEDURE — 80048 BASIC METABOLIC PNL TOTAL CA: CPT

## 2025-02-28 PROCEDURE — 86923 COMPATIBILITY TEST ELECTRIC: CPT

## 2025-02-28 RX ORDER — CHLORHEXIDINE GLUCONATE ORAL RINSE 1.2 MG/ML
15 SOLUTION DENTAL AS NEEDED
Qty: 473 ML | Refills: 0 | Status: ON HOLD | OUTPATIENT
Start: 2025-02-28 | End: 2025-03-19

## 2025-02-28 RX ORDER — CHLORHEXIDINE GLUCONATE 40 MG/ML
SOLUTION TOPICAL DAILY PRN
Qty: 473 ML | Refills: 0 | Status: ON HOLD | OUTPATIENT
Start: 2025-02-28

## 2025-02-28 ASSESSMENT — ENCOUNTER SYMPTOMS
NECK MASS: 0
CONFUSION: 0
LIMITED RANGE OF MOTION: 0
TREMORS: 0
POLYDIPSIA: 0
NUMBNESS: 0
WHEEZING: 0
ABDOMINAL PAIN: 0
DYSPNEA WITH EXERTION: 0
BLOOD IN STOOL: 0
VOICE CHANGE: 0
SINUS CONGESTION: 0
VOMITING: 0
NAUSEA: 0
COUGH: 0
SKIN CHANGES: 0
MYALGIAS: 0
EYE DISCHARGE: 0
TROUBLE SWALLOWING: 0
PALPITATIONS: 0
DIARRHEA: 0
DYSURIA: 0
VERTIGO: 0
NERVOUS/ANXIOUS: 0
NECK STIFFNESS: 0
CONSTIPATION: 0
NECK PAIN: 0
EXCESSIVE BLEEDING: 0
WOUND: 0
DIFFICULTY URINATING: 0
BRUISES/BLEEDS EASILY: 0
HEMOPTYSIS: 0
DOUBLE VISION: 0
WEAKNESS: 0
SHORTNESS OF BREATH: 0
CHILLS: 0
DYSPNEA AT REST: 0
PND: 0
FEVER: 0
NECK SWELLING: 0
UNEXPECTED WEIGHT CHANGE: 0
ARTHRALGIAS: 0
VISUAL CHANGE: 0
JOINT SWELLING: 0
ABDOMINAL DISTENTION: 0
RHINORRHEA: 0
LIGHT-HEADEDNESS: 0

## 2025-02-28 ASSESSMENT — DUKE ACTIVITY SCORE INDEX (DASI)
CAN YOU WALK INDOORS, SUCH AS AROUND YOUR HOUSE: YES
CAN YOU DO HEAVY WORK AROUND THE HOUSE LIKE SCRUBBING FLOORS OR LIFTING AND MOVING HEAVY FURNITURE: NO
TOTAL_SCORE: 37.45
CAN YOU DO YARD WORK LIKE RAKING LEAVES, WEEDING OR PUSHING A MOWER: YES
CAN YOU DO MODERATE WORK AROUND THE HOUSE LIKE VACUUMING, SWEEPING FLOORS OR CARRYING GROCERIES: YES
CAN YOU CLIMB A FLIGHT OF STAIRS OR WALK UP A HILL: YES
CAN YOU DO LIGHT WORK AROUND THE HOUSE LIKE DUSTING OR WASHING DISHES: YES
CAN YOU TAKE CARE OF YOURSELF (EAT, DRESS, BATHE, OR USE TOILET): YES
CAN YOU PARTICIPATE IN MODERATE RECREATIONAL ACTIVITIES LIKE GOLF, BOWLING, DANCING, DOUBLES TENNIS OR THROWING A BASEBALL OR FOOTBALL: YES
CAN YOU PARTICIPATE IN STRENOUS SPORTS LIKE SWIMMING, SINGLES TENNIS, FOOTBALL, BASKETBALL, OR SKIING: NO
CAN YOU HAVE SEXUAL RELATIONS: NO
CAN YOU WALK A BLOCK OR TWO ON LEVEL GROUND: YES
CAN YOU RUN A SHORT DISTANCE: YES
DASI METS SCORE: 7.3

## 2025-02-28 ASSESSMENT — LIFESTYLE VARIABLES: SMOKING_STATUS: NONSMOKER

## 2025-02-28 NOTE — CPM/PAT H&P
"CPM/PAT Evaluation       Name: Arvind Cabrera (Arvind Cabrera \"Geremias\")  /Age: 1960/64 y.o.     Visit Type:   In-Person       Chief Complaint: Perioperative EValuation    HPI HPI: 63 y/o male scheduled for REPAIR, AORTA, ASCENDING THORACIC  on 2025  with Dr. Anastacio Mccauley secondary to Aneurysm of ascending aorta without rupture.   Presents to CPM today for perioperative risk stratification and optimization. 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    #Acute URI - Beginning on 25 patient reports a cough dry & congestion/post nasal drip. No body aches/fevers/chills. Saw PCP on  and was given 5 day course of Azithromycin & mucinex. Reports cough was mostly dry, and nearly resolved as of . Patient reports today he feels much better, and is mostly coughing only to clear phlegm from his throat. Surgeon has been notified via secure chat, recommend typical 4-6 weeks for lung tissue recovery, depending on urgency of surgery. At this point patient will have 2 weeks since resolution of symptoms to currently scheduled surgery. Patient is aware he will hear from surgeons office if needed.     PCP: ONEIL Falcon-CNP  Specialists:   Oncology - Mikala Golden, ONEIL-CNP   Thoracic Surgery - Anastacio Mccauley MD   Cardiology - Caroline Nj MD            Past Medical History:   Diagnosis Date    Anesthesia of skin     Numbness and tingling    Arthritis     hips    Carpal tunnel syndrome 2024    Chronic bronchitis (Multi)     High prostate specific antigen (PSA) 2023    History of prostate cancer 2023    Hypertension     Numbness and tingling sensation of skin 2024    Pain in left shoulder 10/13/2020    Shoulder pain, left    Peripheral neuropathy     Personal history of other specified conditions 2021    History of " elevated prostate specific antigen (PSA)    Postoperative complication 11/19/2024    Prostate cancer (Multi)     2021 s/p prostatectomy and Radiation therapy    Spondylosis without myelopathy or radiculopathy, lumbar region 08/26/2022    Thoracic ascending aortic aneurysm (CMS-HCC)     CT Chest scan on 2/7/25-measuring 5.0 cm       Past Surgical History:   Procedure Laterality Date    CARDIAC CATHETERIZATION N/A 01/24/2025    Procedure: Left & Right Heart Cath w Angiography & LV;  Surgeon: Santi Monroe MD;  Location: [place] [address] [facility];  Service: Cardiovascular;  Laterality: N/A;  request 1/24 or 1/31    OTHER SURGICAL HISTORY  03/23/2020    Carpal tunnel surgery    OTHER SURGICAL HISTORY  12/22/2021    Robotic-assisted laparoscopic radical prostatectomy       Patient Sexual activity questions deferred to the physician.    Family History   Problem Relation Name Age of Onset    Deep vein thrombosis Mother      Other (heart valve replacement) Father      Other (varicose veins) Sister      Heart attack Paternal Grandfather         No Known Allergies    Prior to Admission medications    Medication Sig Start Date End Date Taking? Authorizing Provider   amLODIPine (Norvasc) 5 mg tablet TAKE 2 TABLETS DAILY. 10/11/24 10/11/25  DUNIA Bustamante   losartan (Cozaar) 50 mg tablet TAKE 1 TABLET BY MOUTH 2 TIMES A DAY 6/11/24 6/11/25  DUNIA Bustamante        PAT ROS:   Constitutional:    no fever   no chills   no unexpected weight change  Neuro/Psych:    no numbness   no weakness   no light-headedness   no tremors   no confusion   not nervous/anxious   no self-injury   no suicidal ideation  Eyes:    no discharge   no vision loss   no diplopia   no visual disturbance   use of corrective lenses  Ears:    no ear pain   no hearing loss   no vertigo   no tinnitus   no hearing aides  Nose:    no nasal discharge   no sinus congestion   no epistaxis  Mouth:    no dental issues   no mouth pain   no oral  bleeding   no mouth lesions  Throat:    no throat pain   no dysphagia   no voice change  Neck:    no neck pain   neck swelling   no neck stiffness   no neck masses  Cardio:    no chest pain   no palpitations   no peripheral edema   no dyspnea   no WONG   no paroxysmal nocturnal dyspnea  Respiratory:    no cough   no wheezing   no hemoptysis   no shortness of breath  Endocrine:    no cold intolerance   no heat intolerance   no polydipsia  GI:    no abdominal distention   no abdominal pain   no constipation   no diarrhea   no nausea   no vomiting   no blood in stool  :    no difficulty urinating   no dysuria   no oliguria   no polyuria  Musculoskeletal:    no arthralgias   no myalgias   no decreased ROM   no swelling  Hematologic:    does not bruise/bleed easily   no excessive bleeding   no history of blood transfusion   no blood clots  Skin:   no skin changes   no sores/wound   no rash      Physical Exam  Vitals reviewed.   Constitutional:       General: He is not in acute distress.     Appearance: Normal appearance. He is normal weight. He is not ill-appearing, toxic-appearing or diaphoretic.   HENT:      Head: Normocephalic.      Nose: Nose normal. No congestion or rhinorrhea.      Mouth/Throat:      Mouth: Mucous membranes are moist.      Pharynx: Oropharynx is clear. No oropharyngeal exudate or posterior oropharyngeal erythema.   Eyes:      General: No scleral icterus.        Right eye: No discharge.         Left eye: No discharge.      Conjunctiva/sclera: Conjunctivae normal.   Neck:      Vascular: No carotid bruit.   Cardiovascular:      Rate and Rhythm: Normal rate and regular rhythm.      Pulses: Normal pulses.      Heart sounds: Normal heart sounds. No murmur heard.     No friction rub. No gallop.   Pulmonary:      Effort: Pulmonary effort is normal. No respiratory distress.      Breath sounds: Normal breath sounds. No stridor. No wheezing, rhonchi or rales.   Chest:      Chest wall: No tenderness.    Musculoskeletal:         General: No swelling or tenderness.      Cervical back: Normal range of motion. No rigidity or tenderness.   Neurological:      General: No focal deficit present.      Mental Status: He is alert.   Psychiatric:         Mood and Affect: Mood normal.         Behavior: Behavior normal.         Thought Content: Thought content normal.         Judgment: Judgment normal.          PAT AIRWAY:   Airway:     Mallampati::  II    TM distance::  >3 FB    Neck ROM::  Full   Front top bridge        Visit Vitals  /86   Pulse 69   Temp 36.3 °C (97.3 °F)   Ht 1.829 m (6')   Wt 105 kg (231 lb)   SpO2 98%   BMI 31.33 kg/m²   Smoking Status Never   BSA 2.31 m²       DASI Risk Score      Flowsheet Row Pre-Admission Testing from 2/28/2025 in [facility]   Can you take care of yourself (eat, dress, bathe, or use toilet)?  2.75 filed at 02/28/2025 0814   Can you walk indoors, such as around your house? 1.75 filed at 02/28/2025 0814   Can you walk a block or two on level ground?  2.75 filed at 02/28/2025 0814   Can you climb a flight of stairs or walk up a hill? 5.5 filed at 02/28/2025 0814   Can you run a short distance? 8 filed at 02/28/2025 0814   Can you do light work around the house like dusting or washing dishes? 2.7 filed at 02/28/2025 0814   Can you do moderate work around the house like vacuuming, sweeping floors or carrying groceries? 3.5 filed at 02/28/2025 0814   Can you do heavy work around the house like scrubbing floors or lifting and moving heavy furniture?  0 filed at 02/28/2025 0814   Can you do yard work like raking leaves, weeding or pushing a mower? 4.5 filed at 02/28/2025 0814   Can you have sexual relations? 0 filed at 02/28/2025 0814   Can you participate in moderate recreational activities like golf, bowling, dancing, doubles tennis or throwing a baseball or football? 6 filed at 02/28/2025 0814   Can you participate in strenous sports like swimming, singles tennis,  football, basketball, or skiing? 0 filed at 02/28/2025 0814   DASI SCORE 37.45 filed at 02/28/2025 0814   METS Score (Will be calculated only when all the questions are answered) 7.3 filed at 02/28/2025 0814          Caprini DVT Assessment      Flowsheet Row Pre-Admission Testing from 2/28/2025 in [facility]   DVT Score (IF A SCORE IS NOT CALCULATING, MUST SELECT A BMI TO COMPLETE) 12 filed at 02/28/2025 1119   Medical Factors Present cancer, chemotherapy, or previous malignancy filed at 02/28/2025 1119   Surgical Factors Major surgery planned, lasting over 3 hours filed at 02/28/2025 1119   BMI (BMI MUST BE CHOSEN) 31-40 (Obesity) filed at 02/28/2025 1119          Modified Frailty Index    No data to display       [id number] Stroke Risk Points  Current as of just now        N/A 0 to 9 Points:      Last Change: N/A          The [id number] risk score (Lip GH, et al. 2009. © 2010 American [employer]) quantifies the risk of stroke for a patient with atrial fibrillation. For patients without atrial fibrillation or under the age of 18 this score appears as N/A. Higher score values generally indicate higher risk of stroke.        This score is not applicable to this patient. Components are not calculated.          Revised Cardiac Risk Index      Flowsheet Row Pre-Admission Testing from 2/28/2025 in [facility]   High-Risk Surgery (Intraperitoneal, Intrathoracic,Suprainguinal vascular) 1 filed at 02/28/2025 1118   History of ischemic heart disease (History of MI, History of positive exercuse test, Current chest paint considered due to myocardial ischemia, Use of nitrate therapy, ECG with pathological Q Waves) 0 filed at 02/28/2025 1118   History of congestive heart failure (pulmonary edemia, bilateral rales or S3 gallop, Paroxysmal nocturnal dyspnea, CXR showing pulmonary vascular redistribution) 0 filed at 02/28/2025 1118   History of cerebrovascular disease (Prior TIA  or stroke) 0 filed at 02/28/2025 1118   Pre-operative insulin treatment 0 filed at 02/28/2025 1118   Pre-operative creatinine>2 mg/dl 0 filed at 02/28/2025 1118   Revised Cardiac Risk Calculator 1 filed at 02/28/2025 1118          Apfel Simplified Score      Flowsheet Row Pre-Admission Testing from 2/28/2025 in [facility]   Smoking status 1 filed at 02/28/2025 1120   History of motion sickness or PONV  0 filed at 02/28/2025 1120   Use of postoperative opioids 1 filed at 02/28/2025 1120   Gender - Female 0=No filed at 02/28/2025 1120   Apfel Simplified Score Calculator 2 filed at 02/28/2025 1120          Risk Analysis Index Results This Encounter    No data found in the last 10 encounters.       Stop Bang Score      Flowsheet Row Pre-Admission Testing from 2/28/2025 in [facility]   Do you snore loudly? 1 filed at 02/28/2025 0814   Do you often feel tired or fatigued after your sleep? 0 filed at 02/28/2025 0814   Has anyone ever observed you stop breathing in your sleep? 0 filed at 02/28/2025 0814   Do you have or are you being treated for high blood pressure? 1 filed at 02/28/2025 0814   Recent BMI (Calculated) 31.3 filed at 02/28/2025 0814   Is BMI greater than 35 kg/m2? 0=No filed at 02/28/2025 0814   Age older than 50 years old? 1=Yes filed at 02/28/2025 0814   Is your neck circumference greater than 17 inches (Male) or 16 inches (Female)? 0 filed at 02/28/2025 0814   Gender - Male 1=Yes filed at 02/28/2025 0814   STOP-BANG Total Score 4 filed at 02/28/2025 0814          Prodigy: High Risk  Total Score: 16              Prodigy Age Score      Prodigy Gender Score          ARISCAT Score for Postoperative Pulmonary Complications      Flowsheet Row Pre-Admission Testing from 2/28/2025 in [facility]   Age Calculated Score 3 filed at 02/28/2025 1119   Preoperative SpO2 0 filed at 02/28/2025 1119   Respiratory infection in the last month Either upper or lower (i.e., URI,  bronchitis, pneumonia), with fever and antibiotic treatment 0 filed at 02/28/2025 1119   Preoperative anemia (Hgb less than 10 g/dl) 0 filed at 02/28/2025 1119   Surgical incision  24 filed at 02/28/2025 1119   Duration of surgery  23 filed at 02/28/2025 1119   Emergency Procedure  0 filed at 02/28/2025 1119   ARISCAT Total Score  50 filed at 02/28/2025 1119          Karen Perioperative Risk for Myocardial Infarction or Cardiac Arrest (VLADIMIR)    No data to display           Assessment and Plan:   Anesthesia/Airway:  #Past Anesthesia Concerns - Reports significant hypotension following OR during prostate surgery, states during PACU and was told it was during extubation of anesthesia. No further information.       Neuro:    No neurologic diagnoses, however, the patient is at an increased risk for post operative delirium secondary to type and duration of surgery.  Preoperative brain exercise educational handout provided to patient.    The patient is at an increased risk for perioperative stroke secondary to increased age, HTN, vascular surgery , general anesthesia, and op time >2.5 hours.       HEENT/Airway:    No HEENT diagnosis or significant findings on chart review or clinical presentation and evaluation. No further preoperative testing/intervention indicated at this time.      Cardiovascular:    #HTN, Aneurysm of ascending aorta without rupture - medicated with amlodipine (continue), losartan (last dose morning 3/6) and follows with Cardiology. BP today is 117/86 and denies cardiovascular symptoms. Physical exam is benign. Functional capacity is sufficient. Patient was sent by Cardiologist for current surgical referral for Aorta repair. No additional preoperative testing is currently indicated.    METS are 7.3    RCRI  1 which is 6% 30 day risk of MACE (risk for cardiac death, nonfatal myocardial infarction, and nonfactal cardiac arrest    VLADIMIR score which indicates a   0.2 % risk of intraoperative or 30-day  postoperative MACE    EKG 1/24/25  Normal sinus rhythm  Low voltage QRS  Left anterior fascicular block  Cannot rule out Inferior infarct (cited on or before 23-NOV-2022)  Abnormal ECG    Left Heart Cath 1/24/25  CONCLUSIONS:   1. No evidence of obstructive coronary artery disease in a right dominant system.    Echo 9/27/24  CONCLUSIONS:   1. Left ventricular ejection fraction is normal, calculated by Ruffin's biplane at 64%.   2. Spectral Doppler shows an impaired relaxation pattern of left ventricular diastolic filling.   3. There is normal right ventricular global systolic function.   4. There is moderate dilatation of the ascending aorta.    CT Cardiac Scoring 8/9/24  CONCLUSIONS:   1. Left ventricular ejection fraction is normal, calculated by Ruffin's biplane at 64%.   2. Spectral Doppler shows an impaired relaxation pattern of left ventricular diastolic filling.   3. There is normal right ventricular global systolic function.   4. There is moderate dilatation of the ascending aorta.          Pulmonary:    #Acute URI - Beginning on 2/14/25 patient reports a cough dry & congestion/post nasal drip. No body aches/fevers/chills. Saw PCP on 2/17 and was given 5 day course of Azithromycin & mucinex. Reports cough was mostly dry, and nearly resolved as of 2/25. Patient reports today he feels much better, and is mostly coughing only to clear phlegm from his throat. Surgeon has been notified via secure chat, recommend typical 4-6 weeks for lung tissue recovery, depending on urgency of surgery. At this point patient will have 2 weeks since resolution of symptoms to currently scheduled surgery. Patient is aware he will hear from surgeons office if needed.      Preoperative deep breathing educational handout provided to patient.    No pulmonary diagnosis, however patient is at increased risk of perioperative complications secondary to  age > 60, obesity, site of surgery, major surgery, duration of surgery > 2 hours,  types of anesthetic    ARISCAT:    50  points which is a high (42.1%) risk of in-hospital post-op pulmonary complications     PRODIGY:  16  points which is a high risk of post op opioid induced respiratory depression episodes    STOP BAN   points which is a intermediate risk for moderate to severe MARLI    Pumonary toilet education discussed, patient also provided deep breathing exercises and incentive spirometry educational handout      Renal:   No renal diagnosis, however patient is at increase risk for perioperative renal complications secondary to  Age equal to or greater than 56, BMI equal to or greater than 30, HTN, use of an ace, arb, or NSAID       Endocrine:   No endocrine diagnosis or significant findings on chart review or clinical presentation and evaluation. No further testing or intervention is indicated at this time.      Hematologic:      Preoperative DVT educational handout provided to patient.  No hematologic diagnosis, however patient is at an increased risk for DVT  Caprini Score:  12  points which is a highest risk of perioperative VTE      Gastrointestinal:   #Hypogonadism, Erectile Dysfunction, BPH, - Follows with PCP and denies GIGU symptoms. No further perioperative interventions at this time.   #Prostate Cancer, Mets to spine Grade 5 - Follows with Oncology, reports radiation last - 2022.     EAT-10 score of    0  : self-perceived oropharyngeal dysphagia scale (0-40)     Apfel: 2 points 39% risk for post operative N/V      Infectious disease:     No infectious diagnosis or significant findings on chart review or clinical presentation and evaluation.   Prescription provided for CHG body wash and dental rinse. CHG use instructions reviewed and provided to patient.  Staph screen collected      Musculoskeletal:    No diagnosis or significant findings on chart review or clinical presentation and evaluation.       Other:     Hold all vitamins and supplements 7 days prior to surgery  Tylenol  okay to continue, please hold Aleve/naproxen/ibuprofen (NSAIDs) for 7 days prior to surgery        Labs ordered  Recent Results (from the past 2 weeks)   Prostate Specific Antigen    Collection Time: 02/14/25  4:41 PM   Result Value Ref Range    Prostate Specific AG <0.10 <=4.00 ng/mL   Testosterone    Collection Time: 02/14/25  4:41 PM   Result Value Ref Range    Testosterone 199 (L) 240 - 1,000 ng/dL   CBC    Collection Time: 02/28/25  8:41 AM   Result Value Ref Range    WBC 4.4 4.4 - 11.3 x10*3/uL    nRBC 0.0 0.0 - 0.0 /100 WBCs    RBC 5.04 4.50 - 5.90 x10*6/uL    Hemoglobin 15.1 13.5 - 17.5 g/dL    Hematocrit 45.2 41.0 - 52.0 %    MCV 90 80 - 100 fL    MCH 30.0 26.0 - 34.0 pg    MCHC 33.4 32.0 - 36.0 g/dL    RDW 12.6 11.5 - 14.5 %    Platelets 352 150 - 450 x10*3/uL   Basic metabolic panel    Collection Time: 02/28/25  8:41 AM   Result Value Ref Range    Glucose 90 74 - 99 mg/dL    Sodium 139 136 - 145 mmol/L    Potassium 4.5 3.5 - 5.3 mmol/L    Chloride 104 98 - 107 mmol/L    Bicarbonate 28 21 - 32 mmol/L    Anion Gap 12 10 - 20 mmol/L    Urea Nitrogen 20 6 - 23 mg/dL    Creatinine 0.87 0.50 - 1.30 mg/dL    eGFR >90 >60 mL/min/1.73m*2    Calcium 9.2 8.6 - 10.6 mg/dL           Medication instructions and NPO guidelines reviewed with the patient.  All questions or concerns discussed and addressed.      Laura Berg PA-C

## 2025-02-28 NOTE — PREPROCEDURE INSTRUCTIONS
"      Thank you for visiting [facility] ([facility]) today for your pre-procedure evaluation, you were seen by     Laura Berg PA-C   Department of Anesthesiology and Perioperative Medicine  Main phone [phone]  Fax [phone]      This summary includes instructions and information to aid you during your perioperative period.  Please read carefully. If you have any questions about your visit today, please call the number listed above.  If you become ill or have any changes to your health before your surgery, please contact your primary care provider and alert your surgeon.    Preparing for Surgery       Preoperative Fasting Guidelines    Why must I stop eating and drinking near surgery time?  With sedation, food or liquid in your stomach can enter your lungs causing serious complications  Food can increase nausea and vomiting  When do I need to stop eating and drinking before my surgery?  Do not eat any food after midnight the night before your surgery/procedure.  You may have up to 13.5 ounces of clear liquid until TWO hours before your instructed arrival time to the hospital.  This includes water, black tea/coffee, (no milk or cream) apple juice, and electrolyte drinks (Gatorade)  You may chew gum until TWO hours before your surgery/procedure    Tobacco and Alcohol;  Do not drink alcohol or smoke within 24 hours of surgery.  It is best to quit smoking for as long as possible before any surgery or procedure.       Other Instructions  Why did I have my nose, under my arms, and groin swabbed? The purpose of the swab is to identify Staphylococcus aureus inside your nose or on your skin.  The swab was sent to the laboratory for culture.  A positive swab/culture for Staphylococcus aureus is called colonization or carriage.   What is Staphylococcus aureus? Staphylococcus aureus, also known as \"staph\", is a germ found on the skin or in the nose of healthy people.  Sometimes Staphylococcus " aureus can get into the body and cause an infection.  This can be minor (such as pimples, boils, or other skin problems).  It might also be serious (such as a blood infection, pneumonia, or a surgical site infection). What is Staphylococcus aureus colonization or carriage? Colonization or carriage means that a person has the germ but is not sick from it.  These bacteria can be spread on the hands or when breathing or sneezing. How is Staphylococcus aureus spread? It is most often spread by close contact with a person or item that carries it. What happens if my culture is positive for Staphylococcus aureus? Your doctor/medical team will use this information to guide any antibiotic treatment which may be necessary.  Regardless of the culture results, we will clean the inside of your nose with a betadine swab just before you have your surgery. Will I get an infection if I have Staphylococcus aureus in my nose or on my skin? Anyone can get an infection with Staphylococcus aureus.  However, the best way to reduce your risk of infection is to follow the instructions provided to you for the use of your CHG soap and dental rinse.  , Body Wash; What is a home preoperative antibacterial shower? This shower is a way of cleaning the skin with a germ-killing solution before surgery.  The solution contains chlorhexidine, commonly known as CHG.  CHG is a skin cleanser with germ-killing ability.  Let your doctor know if you are allergic to chlorhexidine. Why do I need to take a preoperative antibacterial shower? Skin is not sterile.  It is best to try to make your skin as free of germs as possible before surgery.  Proper cleansing with a germ-killing soap before surgery can lower the number of germs on your skin.  This helps to reduce the risk of infection at the surgical site.  Following the instructions listed below will help you prepare your skin for surgery.   How do I use the solution? Steps:  Begin using your CHG soap 5 days  before your scheduled surgery on ___________.   First, wash and rinse your hair using the CHG soap. Keep CHG soap away from ear canals and eyes.  Rinse completely, do not condition.  Hair extensions should be removed. , Oral/Dental Rinse: What is oral/dental rinse?  It is a mouthwash. It is a way of cleaning the mouth with a germ-killing solution before your surgery.  The solution contains chlorhexidine, commonly known as CHG. It is used inside the mouth to kill a bacteria known as Staphylococcus aureus.  Let your doctor know if you are allergic to Chlorhexidine. Why do I need to use CHG oral/dental rinse? The CHG oral/dental rinse helps to kill a bacteria in your mouth known as Staphylococcus aureus.  This reduces the risk of infection at the surgical site.  Using your CHG oral/dental rinse STEPS: Use your CHG oral/dental rinse after you brush your teeth the night before (at bedtime) and the morning of your surgery.  Follow all directions on your prescription label.  Use the cap on the container to measure 15 ml.  Swish (gargle if you can) the mouthwash in your mouth for at least 30 seconds, (do not swallow) and spit out.  After you use your CHG rinse, do not rinse your mouth with water, drink or eat.  Please refer to the prescription label for the appropriate time to resume oral intake What side effects might I have using the CHG oral/dental rinse? CHG rinse will stick to plaque on the teeth.  Brush and floss just before use.  Teeth brushing will help avoid staining of plaque during use.       The Week before Surgery        Seven days before Surgery  Check your [facility] medication instructions  Do the exercises provided to you by [facility]   Arrange for a responsible, adult licensed  to take you home after surgery and stay with you for 24 hours.  You will not be permitted to drive yourself home if you have received any anesthetic/sedation  Five days before surgery  Check your [facility] medication instructions  Do the  exercises provided to you by [facility]   Start using Chlorhexidene (CHG) body wash if prescribed (Continue till day of surgery)      The Day before Surgery       Check your [facility] medication and all other [facility] instructions including when to stop eating and drinking  You will be called with your arrival time for surgery in the late afternoon.  If you do not receive a call please reach out to your surgeon's office.  Do not smoke or drink 24 hours before surgery  Prepare items to bring with you to the hospital  Shower with your chlorhexidine wash if prescribed  Brush your teeth and use your chlorhexidine dental rinse if prescribed    The Day of Surgery       Check your [facility] medication instructions  Ensure you follow the instructions for when to stop eating and drinking  Shower, if prescribed use CHG.  Do not apply any lotions, creams, moisturizers, perfume or deodorant  Brush your teeth and use your CHG dental rinse if prescribed  Wear loose comfortable clothing  Avoid make-up  Remove  jewelry and piercings, consider professional piercing removal with a plastic spacer if needed  Bring photo ID and Insurance card  Bring an accurate medication list that includes medication dose, frequency and allergies  Bring a copy of your advanced directives (will, health care power of )  Bring any devices and controllers as well as medical devices you have been provided with for surgery (CPAP, slings, braces, etc.)  Dentures, eyeglasses, and contacts will be removed before surgery, please bring cases for contacts or glasses    Preoperative Deep Breathing Exercises    Why it is important to do deep breathing exercises before my surgery?  Deep breathing exercises strengthen your breathing muscles.  This helps you to recover after your surgery and decreases the chance of breathing complications.    How are the deep breathing exercises done?  Sit straight with your back supported.  Breathe in deeply and slowly through your nose. Your lower  rib cage should expand and your abdomen may move forward.  Hold that breath for 3 to 5 seconds.  Breathe out through pursed lips, slowly and completely.  Rest and repeat 10 times every hour while awake.  Rest longer if you become dizzy or lightheaded.      Preoperative Brain Exercises    What are brain exercises?  A brain exercise is any activity that engages your thinking (cognitive) skills.    What types of activities are considered brain exercises?  Jigsaw puzzles, crossword puzzles, word jumble, memory games, word search, and many more.  Many can be found free online or on your phone via a mobile laura.    Why should I do brain exercises before my surgery?  More recent research has shown brain exercise before surgery can lower the risk of postoperative delirium (confusion) which can be especially important for older adults.  Patients who did brain exercises for 5 to 10 hours the days before surgery, cut their risk of postoperative delirium in half up to 1 week after surgery.    Sit-to-Stand Exercise    What is the sit-to-stand exercise?  The sit-to-stand exercise strengthens the muscles of your lower body and muscles in the center of your body (core muscles for stability) helping to maintain and improve your strength and mobility.  How do I do the sit-to-stand exercise?  The goal is to do this exercise without using your arms or hands.  If this is too difficult, use your arms and hands or a chair with armrests to help slowly push yourself to the standing position and lower yourself back to the sitting position. As the movement becomes easier use your arms and hands less.    Steps to the sit-to-stand exercise  Sit up tall in a sturdy chair, knees bent, feet flat on the floor shoulder-width apart.  Shift your hips/pelvis forward in the chair to correctly position yourself for the next movement.  Lean forward at your hips.  Stand up straight putting equal weight on both feet.  Check to be sure you are properly  aligned with the chair, in a slow controlled movement sit back down.  Repeat this exercise 10-15 times.  If needed you can do it fewer times until your strength improves.  Rest for 1 minute.  Do another 10-15 sit-to-stand exercises.  Try to do this in the morning and evening.       Simple things you can do to help prevent blood clots     Blood clots are blockages that can form in the body's veins. When a blood clot forms in your deep veins, it may be called a deep vein thrombosis, or DVT for short. Blood clots can happen in any part of the body where blood flows, but they are most common in the arms and legs. If a piece of a blood clot breaks free and travels to the lungs, it is called a pulmonary embolus (PE). A PE can be a very serious problem.      Being in the hospital or having surgery can raise your chances of getting a blood clot because you may not be well enough to move around as much as you normally do.         Ways you can help prevent blood clots in the hospital       Wearing SCDs  SCDs stands for Sequential Compression Devices.   SCDs are special sleeves that wrap around your legs. They attach to a pump that fills them with air to gently squeeze your legs every few minutes.  This helps return the blood in your legs to your heart.   SCDs should only be taken off when walking or bathing. SCDs may not be comfortable, but they can help save your life.              Pump SCD leg sleeves  Wearing compression stockings - if your doctor orders them. These special snug-fitting stockings gently squeeze your legs to help blood flow.       Walking. Walking helps move the blood in your legs.   If your doctor says it is ok, try walking the halls at least   5 times a day. Ask us to help you get up, so you don't fall.      Taking any blood-thinning medicines your doctor orders.              Ways you can help prevent blood clots at home         Wearing compression stockings - if your doctor orders them.   Walking - to  help move the blood in your legs.    Taking any blood-thinning medicines your doctor orders.      Signs of a blood clot or PE    Tell your doctor or nurse right away if you have any of the problems listed below.         If you are at home, seek medical care right away. Call 911 for chest pain or problems breathing.            Signs of a blood clot (DVT) - such as pain, swelling, redness, or warmth in your arm or legs.  Signs of a pulmonary embolism (PE) - such as chest pain or feeling short of breath

## 2025-02-28 NOTE — H&P (VIEW-ONLY)
"CPM/PAT Evaluation       Name: Arvind Cabrera (Arvind Cabrera \"Geremias\")  /Age: 1960/64 y.o.     Visit Type:   In-Person       Chief Complaint: Perioperative EValuation    HPI HPI: 63 y/o male scheduled for REPAIR, AORTA, ASCENDING THORACIC  on 2025  with Dr. Anastacio Mccauley secondary to Aneurysm of ascending aorta without rupture.   Presents to CPM today for perioperative risk stratification and optimization. 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    #Acute URI - Beginning on 25 patient reports a cough dry & congestion/post nasal drip. No body aches/fevers/chills. Saw PCP on  and was given 5 day course of Azithromycin & mucinex. Reports cough was mostly dry, and nearly resolved as of . Patient reports today he feels much better, and is mostly coughing only to clear phlegm from his throat. Surgeon has been notified via secure chat, recommend typical 4-6 weeks for lung tissue recovery, depending on urgency of surgery. At this point patient will have 2 weeks since resolution of symptoms to currently scheduled surgery. Patient is aware he will hear from surgeons office if needed.     PCP: ONEIL Falcon-CNP  Specialists:   Oncology - Mikala Golden, ONEIL-CNP   Thoracic Surgery - Anastacio Mccauley MD   Cardiology - Caroline Nj MD            Past Medical History:   Diagnosis Date    Anesthesia of skin     Numbness and tingling    Arthritis     hips    Carpal tunnel syndrome 2024    Chronic bronchitis (Multi)     High prostate specific antigen (PSA) 2023    History of prostate cancer 2023    Hypertension     Numbness and tingling sensation of skin 2024    Pain in left shoulder 10/13/2020    Shoulder pain, left    Peripheral neuropathy     Personal history of other specified conditions 2021    History of " elevated prostate specific antigen (PSA)    Postoperative complication 11/19/2024    Prostate cancer (Multi)     2021 s/p prostatectomy and Radiation therapy    Spondylosis without myelopathy or radiculopathy, lumbar region 08/26/2022    Thoracic ascending aortic aneurysm (CMS-HCC)     CT Chest scan on 2/7/25-measuring 5.0 cm       Past Surgical History:   Procedure Laterality Date    CARDIAC CATHETERIZATION N/A 01/24/2025    Procedure: Left & Right Heart Cath w Angiography & LV;  Surgeon: Santi Monroe MD;  Location: [place] [address] [facility];  Service: Cardiovascular;  Laterality: N/A;  request 1/24 or 1/31    OTHER SURGICAL HISTORY  03/23/2020    Carpal tunnel surgery    OTHER SURGICAL HISTORY  12/22/2021    Robotic-assisted laparoscopic radical prostatectomy       Patient Sexual activity questions deferred to the physician.    Family History   Problem Relation Name Age of Onset    Deep vein thrombosis Mother      Other (heart valve replacement) Father      Other (varicose veins) Sister      Heart attack Paternal Grandfather         No Known Allergies    Prior to Admission medications    Medication Sig Start Date End Date Taking? Authorizing Provider   amLODIPine (Norvasc) 5 mg tablet TAKE 2 TABLETS DAILY. 10/11/24 10/11/25  DUNIA Bustamante   losartan (Cozaar) 50 mg tablet TAKE 1 TABLET BY MOUTH 2 TIMES A DAY 6/11/24 6/11/25  DUNIA Bustamante        PAT ROS:   Constitutional:    no fever   no chills   no unexpected weight change  Neuro/Psych:    no numbness   no weakness   no light-headedness   no tremors   no confusion   not nervous/anxious   no self-injury   no suicidal ideation  Eyes:    no discharge   no vision loss   no diplopia   no visual disturbance   use of corrective lenses  Ears:    no ear pain   no hearing loss   no vertigo   no tinnitus   no hearing aides  Nose:    no nasal discharge   no sinus congestion   no epistaxis  Mouth:    no dental issues   no mouth pain   no oral  bleeding   no mouth lesions  Throat:    no throat pain   no dysphagia   no voice change  Neck:    no neck pain   neck swelling   no neck stiffness   no neck masses  Cardio:    no chest pain   no palpitations   no peripheral edema   no dyspnea   no WONG   no paroxysmal nocturnal dyspnea  Respiratory:    no cough   no wheezing   no hemoptysis   no shortness of breath  Endocrine:    no cold intolerance   no heat intolerance   no polydipsia  GI:    no abdominal distention   no abdominal pain   no constipation   no diarrhea   no nausea   no vomiting   no blood in stool  :    no difficulty urinating   no dysuria   no oliguria   no polyuria  Musculoskeletal:    no arthralgias   no myalgias   no decreased ROM   no swelling  Hematologic:    does not bruise/bleed easily   no excessive bleeding   no history of blood transfusion   no blood clots  Skin:   no skin changes   no sores/wound   no rash      Physical Exam  Vitals reviewed.   Constitutional:       General: He is not in acute distress.     Appearance: Normal appearance. He is normal weight. He is not ill-appearing, toxic-appearing or diaphoretic.   HENT:      Head: Normocephalic.      Nose: Nose normal. No congestion or rhinorrhea.      Mouth/Throat:      Mouth: Mucous membranes are moist.      Pharynx: Oropharynx is clear. No oropharyngeal exudate or posterior oropharyngeal erythema.   Eyes:      General: No scleral icterus.        Right eye: No discharge.         Left eye: No discharge.      Conjunctiva/sclera: Conjunctivae normal.   Neck:      Vascular: No carotid bruit.   Cardiovascular:      Rate and Rhythm: Normal rate and regular rhythm.      Pulses: Normal pulses.      Heart sounds: Normal heart sounds. No murmur heard.     No friction rub. No gallop.   Pulmonary:      Effort: Pulmonary effort is normal. No respiratory distress.      Breath sounds: Normal breath sounds. No stridor. No wheezing, rhonchi or rales.   Chest:      Chest wall: No tenderness.    Musculoskeletal:         General: No swelling or tenderness.      Cervical back: Normal range of motion. No rigidity or tenderness.   Neurological:      General: No focal deficit present.      Mental Status: He is alert.   Psychiatric:         Mood and Affect: Mood normal.         Behavior: Behavior normal.         Thought Content: Thought content normal.         Judgment: Judgment normal.          PAT AIRWAY:   Airway:     Mallampati::  II    TM distance::  >3 FB    Neck ROM::  Full   Front top bridge        Visit Vitals  /86   Pulse 69   Temp 36.3 °C (97.3 °F)   Ht 1.829 m (6')   Wt 105 kg (231 lb)   SpO2 98%   BMI 31.33 kg/m²   Smoking Status Never   BSA 2.31 m²       DASI Risk Score      Flowsheet Row Pre-Admission Testing from 2/28/2025 in [facility]   Can you take care of yourself (eat, dress, bathe, or use toilet)?  2.75 filed at 02/28/2025 0814   Can you walk indoors, such as around your house? 1.75 filed at 02/28/2025 0814   Can you walk a block or two on level ground?  2.75 filed at 02/28/2025 0814   Can you climb a flight of stairs or walk up a hill? 5.5 filed at 02/28/2025 0814   Can you run a short distance? 8 filed at 02/28/2025 0814   Can you do light work around the house like dusting or washing dishes? 2.7 filed at 02/28/2025 0814   Can you do moderate work around the house like vacuuming, sweeping floors or carrying groceries? 3.5 filed at 02/28/2025 0814   Can you do heavy work around the house like scrubbing floors or lifting and moving heavy furniture?  0 filed at 02/28/2025 0814   Can you do yard work like raking leaves, weeding or pushing a mower? 4.5 filed at 02/28/2025 0814   Can you have sexual relations? 0 filed at 02/28/2025 0814   Can you participate in moderate recreational activities like golf, bowling, dancing, doubles tennis or throwing a baseball or football? 6 filed at 02/28/2025 0814   Can you participate in strenous sports like swimming, singles tennis,  football, basketball, or skiing? 0 filed at 02/28/2025 0814   DASI SCORE 37.45 filed at 02/28/2025 0814   METS Score (Will be calculated only when all the questions are answered) 7.3 filed at 02/28/2025 0814          Caprini DVT Assessment      Flowsheet Row Pre-Admission Testing from 2/28/2025 in [facility]   DVT Score (IF A SCORE IS NOT CALCULATING, MUST SELECT A BMI TO COMPLETE) 12 filed at 02/28/2025 1119   Medical Factors Present cancer, chemotherapy, or previous malignancy filed at 02/28/2025 1119   Surgical Factors Major surgery planned, lasting over 3 hours filed at 02/28/2025 1119   BMI (BMI MUST BE CHOSEN) 31-40 (Obesity) filed at 02/28/2025 1119          Modified Frailty Index    No data to display       [id number] Stroke Risk Points  Current as of just now        N/A 0 to 9 Points:      Last Change: N/A          The [id number] risk score (Lip GH, et al. 2009. © 2010 American College [employer]) quantifies the risk of stroke for a patient with atrial fibrillation. For patients without atrial fibrillation or under the age of 18 this score appears as N/A. Higher score values generally indicate higher risk of stroke.        This score is not applicable to this patient. Components are not calculated.          Revised Cardiac Risk Index      Flowsheet Row Pre-Admission Testing from 2/28/2025 in [facility]   High-Risk Surgery (Intraperitoneal, Intrathoracic,Suprainguinal vascular) 1 filed at 02/28/2025 1118   History of ischemic heart disease (History of MI, History of positive exercuse test, Current chest paint considered due to myocardial ischemia, Use of nitrate therapy, ECG with pathological Q Waves) 0 filed at 02/28/2025 1118   History of congestive heart failure (pulmonary edemia, bilateral rales or S3 gallop, Paroxysmal nocturnal dyspnea, CXR showing pulmonary vascular redistribution) 0 filed at 02/28/2025 1118   History of cerebrovascular disease (Prior TIA  or stroke) 0 filed at 02/28/2025 1118   Pre-operative insulin treatment 0 filed at 02/28/2025 1118   Pre-operative creatinine>2 mg/dl 0 filed at 02/28/2025 1118   Revised Cardiac Risk Calculator 1 filed at 02/28/2025 1118          Apfel Simplified Score      Flowsheet Row Pre-Admission Testing from 2/28/2025 in [facility]   Smoking status 1 filed at 02/28/2025 1120   History of motion sickness or PONV  0 filed at 02/28/2025 1120   Use of postoperative opioids 1 filed at 02/28/2025 1120   Gender - Female 0=No filed at 02/28/2025 1120   Apfel Simplified Score Calculator 2 filed at 02/28/2025 1120          Risk Analysis Index Results This Encounter    No data found in the last 10 encounters.       Stop Bang Score      Flowsheet Row Pre-Admission Testing from 2/28/2025 in [facility]   Do you snore loudly? 1 filed at 02/28/2025 0814   Do you often feel tired or fatigued after your sleep? 0 filed at 02/28/2025 0814   Has anyone ever observed you stop breathing in your sleep? 0 filed at 02/28/2025 0814   Do you have or are you being treated for high blood pressure? 1 filed at 02/28/2025 0814   Recent BMI (Calculated) 31.3 filed at 02/28/2025 0814   Is BMI greater than 35 kg/m2? 0=No filed at 02/28/2025 0814   Age older than 50 years old? 1=Yes filed at 02/28/2025 0814   Is your neck circumference greater than 17 inches (Male) or 16 inches (Female)? 0 filed at 02/28/2025 0814   Gender - Male 1=Yes filed at 02/28/2025 0814   STOP-BANG Total Score 4 filed at 02/28/2025 0814          Prodigy: High Risk  Total Score: 16              Prodigy Age Score      Prodigy Gender Score          ARISCAT Score for Postoperative Pulmonary Complications      Flowsheet Row Pre-Admission Testing from 2/28/2025 in [facility]   Age Calculated Score 3 filed at 02/28/2025 1119   Preoperative SpO2 0 filed at 02/28/2025 1119   Respiratory infection in the last month Either upper or lower (i.e., URI,  bronchitis, pneumonia), with fever and antibiotic treatment 0 filed at 02/28/2025 1119   Preoperative anemia (Hgb less than 10 g/dl) 0 filed at 02/28/2025 1119   Surgical incision  24 filed at 02/28/2025 1119   Duration of surgery  23 filed at 02/28/2025 1119   Emergency Procedure  0 filed at 02/28/2025 1119   ARISCAT Total Score  50 filed at 02/28/2025 1119          Karen Perioperative Risk for Myocardial Infarction or Cardiac Arrest (VLADIMIR)    No data to display           Assessment and Plan:   Anesthesia/Airway:  #Past Anesthesia Concerns - Reports significant hypotension following OR during prostate surgery, states during PACU and was told it was during extubation of anesthesia. No further information.       Neuro:    No neurologic diagnoses, however, the patient is at an increased risk for post operative delirium secondary to type and duration of surgery.  Preoperative brain exercise educational handout provided to patient.    The patient is at an increased risk for perioperative stroke secondary to increased age, HTN, vascular surgery , general anesthesia, and op time >2.5 hours.       HEENT/Airway:    No HEENT diagnosis or significant findings on chart review or clinical presentation and evaluation. No further preoperative testing/intervention indicated at this time.      Cardiovascular:    #HTN, Aneurysm of ascending aorta without rupture - medicated with amlodipine (continue), losartan (last dose morning 3/6) and follows with Cardiology. BP today is 117/86 and denies cardiovascular symptoms. Physical exam is benign. Functional capacity is sufficient. Patient was sent by Cardiologist for current surgical referral for Aorta repair. No additional preoperative testing is currently indicated.    METS are 7.3    RCRI  1 which is 6% 30 day risk of MACE (risk for cardiac death, nonfatal myocardial infarction, and nonfactal cardiac arrest    VLADIMIR score which indicates a   0.2 % risk of intraoperative or 30-day  postoperative MACE    EKG 1/24/25  Normal sinus rhythm  Low voltage QRS  Left anterior fascicular block  Cannot rule out Inferior infarct (cited on or before 23-NOV-2022)  Abnormal ECG    Left Heart Cath 1/24/25  CONCLUSIONS:   1. No evidence of obstructive coronary artery disease in a right dominant system.    Echo 9/27/24  CONCLUSIONS:   1. Left ventricular ejection fraction is normal, calculated by Ruffin's biplane at 64%.   2. Spectral Doppler shows an impaired relaxation pattern of left ventricular diastolic filling.   3. There is normal right ventricular global systolic function.   4. There is moderate dilatation of the ascending aorta.    CT Cardiac Scoring 8/9/24  CONCLUSIONS:   1. Left ventricular ejection fraction is normal, calculated by Ruffin's biplane at 64%.   2. Spectral Doppler shows an impaired relaxation pattern of left ventricular diastolic filling.   3. There is normal right ventricular global systolic function.   4. There is moderate dilatation of the ascending aorta.          Pulmonary:    #Acute URI - Beginning on 2/14/25 patient reports a cough dry & congestion/post nasal drip. No body aches/fevers/chills. Saw PCP on 2/17 and was given 5 day course of Azithromycin & mucinex. Reports cough was mostly dry, and nearly resolved as of 2/25. Patient reports today he feels much better, and is mostly coughing only to clear phlegm from his throat. Surgeon has been notified via secure chat, recommend typical 4-6 weeks for lung tissue recovery, depending on urgency of surgery. At this point patient will have 2 weeks since resolution of symptoms to currently scheduled surgery. Patient is aware he will hear from surgeons office if needed.      Preoperative deep breathing educational handout provided to patient.    No pulmonary diagnosis, however patient is at increased risk of perioperative complications secondary to  age > 60, obesity, site of surgery, major surgery, duration of surgery > 2 hours,  types of anesthetic    ARISCAT:    50  points which is a high (42.1%) risk of in-hospital post-op pulmonary complications     PRODIGY:  16  points which is a high risk of post op opioid induced respiratory depression episodes    STOP BAN   points which is a intermediate risk for moderate to severe MARLI    Pumonary toilet education discussed, patient also provided deep breathing exercises and incentive spirometry educational handout      Renal:   No renal diagnosis, however patient is at increase risk for perioperative renal complications secondary to  Age equal to or greater than 56, BMI equal to or greater than 30, HTN, use of an ace, arb, or NSAID       Endocrine:   No endocrine diagnosis or significant findings on chart review or clinical presentation and evaluation. No further testing or intervention is indicated at this time.      Hematologic:      Preoperative DVT educational handout provided to patient.  No hematologic diagnosis, however patient is at an increased risk for DVT  Caprini Score:  12  points which is a highest risk of perioperative VTE      Gastrointestinal:   #Hypogonadism, Erectile Dysfunction, BPH, - Follows with PCP and denies GIGU symptoms. No further perioperative interventions at this time.   #Prostate Cancer, Mets to spine Grade 5 - Follows with Oncology, reports radiation last - 2022.     EAT-10 score of    0  : self-perceived oropharyngeal dysphagia scale (0-40)     Apfel: 2 points 39% risk for post operative N/V      Infectious disease:     No infectious diagnosis or significant findings on chart review or clinical presentation and evaluation.   Prescription provided for CHG body wash and dental rinse. CHG use instructions reviewed and provided to patient.  Staph screen collected      Musculoskeletal:    No diagnosis or significant findings on chart review or clinical presentation and evaluation.       Other:     Hold all vitamins and supplements 7 days prior to surgery  Tylenol  okay to continue, please hold Aleve/naproxen/ibuprofen (NSAIDs) for 7 days prior to surgery        Labs ordered  Recent Results (from the past 2 weeks)   Type And Screen Is this order related to pregnancy or an upcoming surgery? Yes; Where will this surgery/delivery be performed? [facility]; What is the date of the surgery? 3/7/2025; Has this patient ever had a transfusion? No; I,  (e...    Collection Time: 02/28/25  8:41 AM   Result Value Ref Range    ABO TYPE A     Rh TYPE POS     ANTIBODY SCREEN NEG    Staphylococcus aureus/MRSA colonization, Culture    Collection Time: 02/28/25  8:41 AM    Specimen: Nares/Axilla/Groin; Swab   Result Value Ref Range    Staph/MRSA Screen Culture No Staphylococcus aureus isolated    CBC    Collection Time: 02/28/25  8:41 AM   Result Value Ref Range    WBC 4.4 4.4 - 11.3 x10*3/uL    nRBC 0.0 0.0 - 0.0 /100 WBCs    RBC 5.04 4.50 - 5.90 x10*6/uL    Hemoglobin 15.1 13.5 - 17.5 g/dL    Hematocrit 45.2 41.0 - 52.0 %    MCV 90 80 - 100 fL    MCH 30.0 26.0 - 34.0 pg    MCHC 33.4 32.0 - 36.0 g/dL    RDW 12.6 11.5 - 14.5 %    Platelets 352 150 - 450 x10*3/uL   Basic metabolic panel    Collection Time: 02/28/25  8:41 AM   Result Value Ref Range    Glucose 90 74 - 99 mg/dL    Sodium 139 136 - 145 mmol/L    Potassium 4.5 3.5 - 5.3 mmol/L    Chloride 104 98 - 107 mmol/L    Bicarbonate 28 21 - 32 mmol/L    Anion Gap 12 10 - 20 mmol/L    Urea Nitrogen 20 6 - 23 mg/dL    Creatinine 0.87 0.50 - 1.30 mg/dL    eGFR >90 >60 mL/min/1.73m*2    Calcium 9.2 8.6 - 10.6 mg/dL           Medication instructions and NPO guidelines reviewed with the patient.  All questions or concerns discussed and addressed.      Laura Berg PA-C

## 2025-03-01 LAB — STAPHYLOCOCCUS SPEC CULT: NORMAL

## 2025-03-03 ENCOUNTER — PHARMACY VISIT (OUTPATIENT)
Dept: PHARMACY | Facility: CLINIC | Age: 65
End: 2025-03-03
Payer: COMMERCIAL

## 2025-03-03 PROCEDURE — RXMED WILLOW AMBULATORY MEDICATION CHARGE

## 2025-03-06 ENCOUNTER — ANESTHESIA EVENT (OUTPATIENT)
Dept: OPERATING ROOM | Facility: HOSPITAL | Age: 65
End: 2025-03-06
Payer: COMMERCIAL

## 2025-03-06 NOTE — PROGRESS NOTES
"Pharmacy Medication History Review    Arvind Cabrera \"Geremias\" is a 64 y.o. male who is planned to be admitted for Aneurysm of ascending aorta without rupture (CMS-[facility]). Pharmacy called the patient prior to their scheduled procedure and reviewed the patient's [id number] medications for accuracy.    Medications ADDED:  none  Medications CHANGED:  Amlodipine 5mg directions from  #2QD to #1BID  Medications REMOVED:   none    Please review updated prior to admission medication list and comments regarding how patient may be taking medications differently by going to Admission tab --> Admission Orders --> Admit Orders / Review prior to admission medications.     Preferred pharmacy, last doses of medications, and allergies to be confirmed with patient by nursing the day of procedure.     Sources used to complete the med history include:  [facility]  Pharmacy dispense history  Patient interview  Chart Review  Care Everywhere     Below are additional concerns with the patient's PTA list.  Patient states they are taking #1 tablet of amlodipine 5mg twice daily. (Separates the dose up thru the day) L.F. 01/14/25 #180/90d    Georgie Walker [facility]  Please reach out via Secure Chat for questions   "

## 2025-03-07 ENCOUNTER — APPOINTMENT (OUTPATIENT)
Dept: RADIOLOGY | Facility: HOSPITAL | Age: 65
DRG: 220 | End: 2025-03-07
Payer: COMMERCIAL

## 2025-03-07 ENCOUNTER — HOSPITAL ENCOUNTER (INPATIENT)
Facility: HOSPITAL | Age: 65
End: 2025-03-07
Attending: THORACIC SURGERY (CARDIOTHORACIC VASCULAR SURGERY) | Admitting: THORACIC SURGERY (CARDIOTHORACIC VASCULAR SURGERY)
Payer: COMMERCIAL

## 2025-03-07 ENCOUNTER — ANESTHESIA (OUTPATIENT)
Dept: OPERATING ROOM | Facility: HOSPITAL | Age: 65
End: 2025-03-07
Payer: COMMERCIAL

## 2025-03-07 ENCOUNTER — HOSPITAL ENCOUNTER (OUTPATIENT)
Dept: OPERATING ROOM | Facility: HOSPITAL | Age: 65
Discharge: HOME | End: 2025-03-07

## 2025-03-07 ENCOUNTER — APPOINTMENT (OUTPATIENT)
Dept: CARDIOLOGY | Facility: HOSPITAL | Age: 65
DRG: 220 | End: 2025-03-07
Payer: COMMERCIAL

## 2025-03-07 DIAGNOSIS — F51.02 ADJUSTMENT INSOMNIA: ICD-10-CM

## 2025-03-07 DIAGNOSIS — I71.21 ASCENDING AORTIC ANEURYSM, UNSPECIFIED WHETHER RUPTURED (CMS-HCC): ICD-10-CM

## 2025-03-07 DIAGNOSIS — Z98.890 STATUS POST CARDIAC SURGERY: ICD-10-CM

## 2025-03-07 DIAGNOSIS — D50.8 IRON DEFICIENCY ANEMIA SECONDARY TO INADEQUATE DIETARY IRON INTAKE: ICD-10-CM

## 2025-03-07 DIAGNOSIS — G89.18 ACUTE POSTOPERATIVE PAIN: ICD-10-CM

## 2025-03-07 DIAGNOSIS — K59.03 DRUG-INDUCED CONSTIPATION: ICD-10-CM

## 2025-03-07 DIAGNOSIS — R60.9 EDEMA, UNSPECIFIED TYPE: ICD-10-CM

## 2025-03-07 DIAGNOSIS — I71.21 ANEURYSM OF ASCENDING AORTA WITHOUT RUPTURE (CMS-HCC): Primary | ICD-10-CM

## 2025-03-07 DIAGNOSIS — L08.9 SKIN INFECTION: ICD-10-CM

## 2025-03-07 DIAGNOSIS — R26.9 IMPAIRED GAIT: ICD-10-CM

## 2025-03-07 DIAGNOSIS — I47.29 OTHER VENTRICULAR TACHYCARDIA: ICD-10-CM

## 2025-03-07 LAB
ABO GROUP (TYPE) IN BLOOD: NORMAL
ACT BLD: 463 SEC (ref 82–174)
ACT BLD: 464 SEC (ref 82–174)
ACT BLD: 579 SEC (ref 82–174)
ACT BLD: 96 SEC (ref 82–174)
ACT BLD: 96 SEC (ref 82–174)
ALBUMIN SERPL BCP-MCNC: 3.7 G/DL (ref 3.4–5)
ANION GAP BLDA CALCULATED.4IONS-SCNC: 10 MMO/L (ref 10–25)
ANION GAP BLDA CALCULATED.4IONS-SCNC: 10 MMO/L (ref 10–25)
ANION GAP BLDA CALCULATED.4IONS-SCNC: 11 MMO/L (ref 10–25)
ANION GAP BLDA CALCULATED.4IONS-SCNC: 11 MMO/L (ref 10–25)
ANION GAP BLDA CALCULATED.4IONS-SCNC: 8 MMO/L (ref 10–25)
ANION GAP BLDA CALCULATED.4IONS-SCNC: 9 MMO/L (ref 10–25)
ANION GAP BLDV CALCULATED.4IONS-SCNC: 10 MMOL/L (ref 10–25)
ANION GAP SERPL CALC-SCNC: 14 MMOL/L (ref 10–20)
APTT PPP: 29 SECONDS (ref 26–36)
BASE EXCESS BLDA CALC-SCNC: -0.2 MMOL/L (ref -2–3)
BASE EXCESS BLDA CALC-SCNC: -0.2 MMOL/L (ref -2–3)
BASE EXCESS BLDA CALC-SCNC: -1.5 MMOL/L (ref -2–3)
BASE EXCESS BLDA CALC-SCNC: 0.5 MMOL/L (ref -2–3)
BASE EXCESS BLDA CALC-SCNC: 0.5 MMOL/L (ref -2–3)
BASE EXCESS BLDA CALC-SCNC: 1 MMOL/L (ref -2–3)
BASE EXCESS BLDA CALC-SCNC: 1.4 MMOL/L (ref -2–3)
BASE EXCESS BLDA CALC-SCNC: 1.5 MMOL/L (ref -2–3)
BASE EXCESS BLDV CALC-SCNC: 1 MMOL/L (ref -2–3)
BLOOD EXPIRATION DATE: NORMAL
BODY TEMPERATURE: 37 DEGREES CELSIUS
BUN SERPL-MCNC: 20 MG/DL (ref 6–23)
CA-I BLDA-SCNC: 0.97 MMOL/L (ref 1.1–1.33)
CA-I BLDA-SCNC: 1 MMOL/L (ref 1.1–1.33)
CA-I BLDA-SCNC: 1.07 MMOL/L (ref 1.1–1.33)
CA-I BLDA-SCNC: 1.09 MMOL/L (ref 1.1–1.33)
CA-I BLDA-SCNC: 1.09 MMOL/L (ref 1.1–1.33)
CA-I BLDA-SCNC: 1.1 MMOL/L (ref 1.1–1.33)
CA-I BLDA-SCNC: 1.1 MMOL/L (ref 1.1–1.33)
CA-I BLDA-SCNC: 1.13 MMOL/L (ref 1.1–1.33)
CA-I BLDV-SCNC: 0.96 MMOL/L (ref 1.1–1.33)
CALCIUM SERPL-MCNC: 8.1 MG/DL (ref 8.6–10.6)
CFT FORM KAOLIN IND BLD RES TEG: 0.9 MIN (ref 0.8–2.1)
CFT FORM KAOLIN IND BLD RES TEG: 1.2 MIN (ref 0.8–2.1)
CHLORIDE BLDA-SCNC: 103 MMOL/L (ref 98–107)
CHLORIDE BLDA-SCNC: 103 MMOL/L (ref 98–107)
CHLORIDE BLDA-SCNC: 104 MMOL/L (ref 98–107)
CHLORIDE BLDA-SCNC: 104 MMOL/L (ref 98–107)
CHLORIDE BLDA-SCNC: 105 MMOL/L (ref 98–107)
CHLORIDE BLDA-SCNC: 106 MMOL/L (ref 98–107)
CHLORIDE BLDV-SCNC: 102 MMOL/L (ref 98–107)
CHLORIDE SERPL-SCNC: 105 MMOL/L (ref 98–107)
CLOT ANGLE.KAOLIN INDUCED BLD RES TEG: 74 DEG (ref 63–78)
CLOT ANGLE.KAOLIN INDUCED BLD RES TEG: 76 DEG (ref 63–78)
CLOT INIT KAO IND P HEP NEUT BLD RES TEG: 6.3 MIN (ref 4.3–8.3)
CLOT INIT KAO IND P HEP NEUT BLD RES TEG: 6.4 MIN (ref 4.6–9.1)
CLOT INIT KAO IND P HEP NEUT BLD RES TEG: 6.5 MIN (ref 4.3–8.3)
CLOT INIT KAO IND P HEP NEUT BLD RES TEG: 8.3 MIN (ref 4.6–9.1)
CO2 SERPL-SCNC: 24 MMOL/L (ref 21–32)
COHGB MFR BLDA: 0.6 %
COHGB MFR BLDA: 0.8 %
COHGB MFR BLDA: 0.9 %
COHGB MFR BLDA: 0.9 %
COHGB MFR BLDA: 1.2 %
COHGB MFR BLDV: 1.5 %
CREAT SERPL-MCNC: 0.8 MG/DL (ref 0.5–1.3)
DISPENSE STATUS: NORMAL
DO-HGB MFR BLDA: 0 % (ref 0–5)
DO-HGB MFR BLDA: 0.3 % (ref 0–5)
EGFRCR SERPLBLD CKD-EPI 2021: >90 ML/MIN/1.73M*2
ERYTHROCYTE [DISTWIDTH] IN BLOOD BY AUTOMATED COUNT: 12.4 % (ref 11.5–14.5)
FIBRINOGEN BLD CALC-MCNC: 348 MG/DL (ref 278–581)
FIBRINOGEN BLD CALC-MCNC: 473 MG/DL (ref 278–581)
FIBRINOGEN PPP-MCNC: 219 MG/DL (ref 200–400)
GLUCOSE BLD MANUAL STRIP-MCNC: 115 MG/DL (ref 74–99)
GLUCOSE BLD MANUAL STRIP-MCNC: 135 MG/DL (ref 74–99)
GLUCOSE BLD MANUAL STRIP-MCNC: 148 MG/DL (ref 74–99)
GLUCOSE BLDA-MCNC: 105 MG/DL (ref 74–99)
GLUCOSE BLDA-MCNC: 123 MG/DL (ref 74–99)
GLUCOSE BLDA-MCNC: 135 MG/DL (ref 74–99)
GLUCOSE BLDA-MCNC: 143 MG/DL (ref 74–99)
GLUCOSE BLDA-MCNC: 147 MG/DL (ref 74–99)
GLUCOSE BLDA-MCNC: 159 MG/DL (ref 74–99)
GLUCOSE BLDA-MCNC: 163 MG/DL (ref 74–99)
GLUCOSE BLDA-MCNC: 173 MG/DL (ref 74–99)
GLUCOSE BLDV-MCNC: 148 MG/DL (ref 74–99)
GLUCOSE SERPL-MCNC: 152 MG/DL (ref 74–99)
HCO3 BLDA-SCNC: 24.3 MMOL/L (ref 22–26)
HCO3 BLDA-SCNC: 24.8 MMOL/L (ref 22–26)
HCO3 BLDA-SCNC: 24.8 MMOL/L (ref 22–26)
HCO3 BLDA-SCNC: 24.9 MMOL/L (ref 22–26)
HCO3 BLDA-SCNC: 25.2 MMOL/L (ref 22–26)
HCO3 BLDA-SCNC: 25.4 MMOL/L (ref 22–26)
HCO3 BLDA-SCNC: 25.4 MMOL/L (ref 22–26)
HCO3 BLDA-SCNC: 26.6 MMOL/L (ref 22–26)
HCO3 BLDV-SCNC: 26.6 MMOL/L (ref 22–26)
HCT VFR BLD AUTO: 38.4 % (ref 41–52)
HCT VFR BLD EST: 31 % (ref 41–52)
HCT VFR BLD EST: 32 % (ref 41–52)
HCT VFR BLD EST: 33 % (ref 41–52)
HCT VFR BLD EST: 35 % (ref 41–52)
HCT VFR BLD EST: 38 % (ref 41–52)
HCT VFR BLD EST: 42 % (ref 41–52)
HCT VFR BLD EST: 43 % (ref 41–52)
HCT VFR BLD EST: 44 % (ref 41–52)
HCT VFR BLD EST: 44 % (ref 41–52)
HGB BLD-MCNC: 13.8 G/DL (ref 13.5–17.5)
HGB BLDA-MCNC: 10.8 G/DL (ref 13.5–17.5)
HGB BLDA-MCNC: 10.8 G/DL (ref 13.5–17.5)
HGB BLDA-MCNC: 11.1 G/DL (ref 13.5–17.5)
HGB BLDA-MCNC: 11.1 G/DL (ref 13.5–17.5)
HGB BLDA-MCNC: 11.5 G/DL (ref 13.5–17.5)
HGB BLDA-MCNC: 11.5 G/DL (ref 13.5–17.5)
HGB BLDA-MCNC: 12.7 G/DL (ref 13.5–17.5)
HGB BLDA-MCNC: 12.7 G/DL (ref 13.5–17.5)
HGB BLDA-MCNC: 14.1 G/DL (ref 13.5–17.5)
HGB BLDA-MCNC: 14.4 G/DL (ref 13.5–17.5)
HGB BLDA-MCNC: 14.4 G/DL (ref 13.5–17.5)
HGB BLDA-MCNC: 14.5 G/DL (ref 13.5–17.5)
HGB BLDA-MCNC: 14.6 G/DL (ref 13.5–17.5)
HGB BLDV-MCNC: 10.2 G/DL (ref 13.5–17.5)
INHALED O2 CONCENTRATION: 100 %
INHALED O2 CONCENTRATION: 100 %
INHALED O2 CONCENTRATION: 36 %
INHALED O2 CONCENTRATION: 50 %
INHALED O2 CONCENTRATION: 50 %
INHALED O2 CONCENTRATION: 60 %
INHALED O2 CONCENTRATION: 60 %
INHALED O2 CONCENTRATION: 75 %
INHALED O2 CONCENTRATION: 75 %
INR PPP: 1.1 (ref 0.9–1.1)
LACTATE BLDA-SCNC: 0.9 MMOL/L (ref 0.4–2)
LACTATE BLDA-SCNC: 0.9 MMOL/L (ref 0.4–2)
LACTATE BLDA-SCNC: 1 MMOL/L (ref 0.4–2)
LACTATE BLDA-SCNC: 1.1 MMOL/L (ref 0.4–2)
LACTATE BLDA-SCNC: 1.2 MMOL/L (ref 0.4–2)
LACTATE BLDA-SCNC: 1.2 MMOL/L (ref 0.4–2)
LACTATE BLDA-SCNC: 1.4 MMOL/L (ref 0.4–2)
LACTATE BLDA-SCNC: 1.6 MMOL/L (ref 0.4–2)
LACTATE BLDV-SCNC: 0.9 MMOL/L (ref 0.4–2)
MA KAOLIN BLD RES TEG: 59 MM (ref 52–69)
MA KAOLIN BLD RES TEG: 64 MM (ref 52–69)
MA KAOLIN+TF BLD RES TEG: 61 MM (ref 52–70)
MA KAOLIN+TF BLD RES TEG: 67 MM (ref 52–70)
MA TF IND+IIB-IIIA INH BLD RES TEG: 19 MM (ref 15–32)
MA TF IND+IIB-IIIA INH BLD RES TEG: 26 MM (ref 15–32)
MAGNESIUM SERPL-MCNC: 2.91 MG/DL (ref 1.6–2.4)
MCH RBC QN AUTO: 30.9 PG (ref 26–34)
MCHC RBC AUTO-ENTMCNC: 35.9 G/DL (ref 32–36)
MCV RBC AUTO: 86 FL (ref 80–100)
METHGB MFR BLDA: 0.8 % (ref 0–1.5)
METHGB MFR BLDA: 1 % (ref 0–1.5)
METHGB MFR BLDA: 1.1 % (ref 0–1.5)
METHGB MFR BLDA: 1.1 % (ref 0–1.5)
METHGB MFR BLDA: 1.3 % (ref 0–1.5)
METHGB MFR BLDV: 1.1 % (ref 0–1.5)
NRBC BLD-RTO: 0 /100 WBCS (ref 0–0)
OXYHGB MFR BLDA: 96 % (ref 94–98)
OXYHGB MFR BLDA: 96.6 % (ref 94–98)
OXYHGB MFR BLDA: 97.8 % (ref 94–98)
OXYHGB MFR BLDA: 97.9 % (ref 94–98)
OXYHGB MFR BLDA: 97.9 % (ref 94–98)
OXYHGB MFR BLDA: 98 % (ref 94–98)
OXYHGB MFR BLDA: 98.1 % (ref 94–98)
OXYHGB MFR BLDA: 98.1 % (ref 94–98)
OXYHGB MFR BLDV: 86.5 % (ref 45–75)
PCO2 BLDA: 35 MM HG (ref 38–42)
PCO2 BLDA: 38 MM HG (ref 38–42)
PCO2 BLDA: 41 MM HG (ref 38–42)
PCO2 BLDA: 43 MM HG (ref 38–42)
PCO2 BLDA: 44 MM HG (ref 38–42)
PCO2 BLDV: 46 MM HG (ref 41–51)
PH BLDA: 7.35 PH (ref 7.38–7.42)
PH BLDA: 7.39 PH (ref 7.38–7.42)
PH BLDA: 7.39 PH (ref 7.38–7.42)
PH BLDA: 7.4 PH (ref 7.38–7.42)
PH BLDA: 7.43 PH (ref 7.38–7.42)
PH BLDA: 7.46 PH (ref 7.38–7.42)
PH BLDV: 7.37 PH (ref 7.33–7.43)
PHOSPHATE SERPL-MCNC: 2.6 MG/DL (ref 2.5–4.9)
PLATELET # BLD AUTO: 191 X10*3/UL (ref 150–450)
PO2 BLDA: 123 MM HG (ref 85–95)
PO2 BLDA: 138 MM HG (ref 85–95)
PO2 BLDA: 172 MM HG (ref 85–95)
PO2 BLDA: 320 MM HG (ref 85–95)
PO2 BLDA: 400 MM HG (ref 85–95)
PO2 BLDA: 419 MM HG (ref 85–95)
PO2 BLDA: 536 MM HG (ref 85–95)
PO2 BLDA: 90 MM HG (ref 85–95)
PO2 BLDV: 49 MM HG (ref 35–45)
POTASSIUM BLDA-SCNC: 3.7 MMOL/L (ref 3.5–5.3)
POTASSIUM BLDA-SCNC: 3.8 MMOL/L (ref 3.5–5.3)
POTASSIUM BLDA-SCNC: 4 MMOL/L (ref 3.5–5.3)
POTASSIUM BLDA-SCNC: 4 MMOL/L (ref 3.5–5.3)
POTASSIUM BLDA-SCNC: 4.2 MMOL/L (ref 3.5–5.3)
POTASSIUM BLDA-SCNC: 4.3 MMOL/L (ref 3.5–5.3)
POTASSIUM BLDV-SCNC: 4.5 MMOL/L (ref 3.5–5.3)
POTASSIUM SERPL-SCNC: 4.1 MMOL/L (ref 3.5–5.3)
PRODUCT BLOOD TYPE: 6200
PRODUCT CODE: NORMAL
PROTHROMBIN TIME: 12.3 SECONDS (ref 9.8–12.4)
RBC # BLD AUTO: 4.47 X10*6/UL (ref 4.5–5.9)
RH FACTOR (ANTIGEN D): NORMAL
SAO2 % BLDA: 100 % (ref 94–100)
SAO2 % BLDA: 99 % (ref 94–100)
SAO2 % BLDA: 99 % (ref 94–100)
SAO2 % BLDV: 89 % (ref 45–75)
SODIUM BLDA-SCNC: 134 MMOL/L (ref 136–145)
SODIUM BLDA-SCNC: 134 MMOL/L (ref 136–145)
SODIUM BLDA-SCNC: 135 MMOL/L (ref 136–145)
SODIUM BLDA-SCNC: 136 MMOL/L (ref 136–145)
SODIUM BLDA-SCNC: 137 MMOL/L (ref 136–145)
SODIUM BLDV-SCNC: 134 MMOL/L (ref 136–145)
SODIUM SERPL-SCNC: 139 MMOL/L (ref 136–145)
TEST COMMENT: NORMAL
TEST COMMENT: NORMAL
UNIT ABO: NORMAL
UNIT NUMBER: NORMAL
UNIT RH: NORMAL
UNIT VOLUME: 314
UNIT VOLUME: 350
UNIT VOLUME: 388
WBC # BLD AUTO: 14.3 X10*3/UL (ref 4.4–11.3)
XM INTEP: NORMAL

## 2025-03-07 PROCEDURE — 85027 COMPLETE CBC AUTOMATED: CPT

## 2025-03-07 PROCEDURE — 88304 TISSUE EXAM BY PATHOLOGIST: CPT | Performed by: PATHOLOGY

## 2025-03-07 PROCEDURE — 83735 ASSAY OF MAGNESIUM: CPT

## 2025-03-07 PROCEDURE — 33859 AS-AORT GRF F/DS OTH/THN DSJ: CPT | Performed by: THORACIC SURGERY (CARDIOTHORACIC VASCULAR SURGERY)

## 2025-03-07 PROCEDURE — 2020000001 HC ICU ROOM DAILY

## 2025-03-07 PROCEDURE — 85384 FIBRINOGEN ACTIVITY: CPT

## 2025-03-07 PROCEDURE — 3600000011 HC PERFUSION TIME - INITIAL BASE CHARGE: Performed by: THORACIC SURGERY (CARDIOTHORACIC VASCULAR SURGERY)

## 2025-03-07 PROCEDURE — 2500000001 HC RX 250 WO HCPCS SELF ADMINISTERED DRUGS (ALT 637 FOR MEDICARE OP)

## 2025-03-07 PROCEDURE — 02RX0JZ REPLACEMENT OF THORACIC AORTA, ASCENDING/ARCH WITH SYNTHETIC SUBSTITUTE, OPEN APPROACH: ICD-10-PCS | Performed by: THORACIC SURGERY (CARDIOTHORACIC VASCULAR SURGERY)

## 2025-03-07 PROCEDURE — 84132 ASSAY OF SERUM POTASSIUM: CPT

## 2025-03-07 PROCEDURE — 3600000017 HC OR TIME - EACH INCREMENTAL 1 MINUTE - PROCEDURE LEVEL SIX: Performed by: THORACIC SURGERY (CARDIOTHORACIC VASCULAR SURGERY)

## 2025-03-07 PROCEDURE — C1900 LEAD, CORONARY VENOUS: HCPCS | Performed by: THORACIC SURGERY (CARDIOTHORACIC VASCULAR SURGERY)

## 2025-03-07 PROCEDURE — P9047 ALBUMIN (HUMAN), 25%, 50ML: HCPCS

## 2025-03-07 PROCEDURE — 99221 1ST HOSP IP/OBS SF/LOW 40: CPT | Performed by: ANESTHESIOLOGY

## 2025-03-07 PROCEDURE — 85730 THROMBOPLASTIN TIME PARTIAL: CPT

## 2025-03-07 PROCEDURE — 5A1221Z PERFORMANCE OF CARDIAC OUTPUT, CONTINUOUS: ICD-10-PCS | Performed by: THORACIC SURGERY (CARDIOTHORACIC VASCULAR SURGERY)

## 2025-03-07 PROCEDURE — 2500000004 HC RX 250 GENERAL PHARMACY W/ HCPCS (ALT 636 FOR OP/ED)

## 2025-03-07 PROCEDURE — 3700000001 HC GENERAL ANESTHESIA TIME - INITIAL BASE CHARGE: Performed by: THORACIC SURGERY (CARDIOTHORACIC VASCULAR SURGERY)

## 2025-03-07 PROCEDURE — 82375 ASSAY CARBOXYHB QUANT: CPT

## 2025-03-07 PROCEDURE — 2500000002 HC RX 250 W HCPCS SELF ADMINISTERED DRUGS (ALT 637 FOR MEDICARE OP, ALT 636 FOR OP/ED)

## 2025-03-07 PROCEDURE — 71045 X-RAY EXAM CHEST 1 VIEW: CPT

## 2025-03-07 PROCEDURE — A4649 SURGICAL SUPPLIES: HCPCS | Performed by: THORACIC SURGERY (CARDIOTHORACIC VASCULAR SURGERY)

## 2025-03-07 PROCEDURE — 2500000005 HC RX 250 GENERAL PHARMACY W/O HCPCS: Performed by: THORACIC SURGERY (CARDIOTHORACIC VASCULAR SURGERY)

## 2025-03-07 PROCEDURE — 85610 PROTHROMBIN TIME: CPT

## 2025-03-07 PROCEDURE — 3600000012 HC PERFUSION TIME - EACH INCREMENTAL 1 MINUTE: Performed by: THORACIC SURGERY (CARDIOTHORACIC VASCULAR SURGERY)

## 2025-03-07 PROCEDURE — 2780000003 HC OR 278 NO HCPCS: Performed by: THORACIC SURGERY (CARDIOTHORACIC VASCULAR SURGERY)

## 2025-03-07 PROCEDURE — 85347 COAGULATION TIME ACTIVATED: CPT

## 2025-03-07 PROCEDURE — 93005 ELECTROCARDIOGRAM TRACING: CPT

## 2025-03-07 PROCEDURE — 88304 TISSUE EXAM BY PATHOLOGIST: CPT | Mod: TC,SUR | Performed by: THORACIC SURGERY (CARDIOTHORACIC VASCULAR SURGERY)

## 2025-03-07 PROCEDURE — 2500000004 HC RX 250 GENERAL PHARMACY W/ HCPCS (ALT 636 FOR OP/ED): Performed by: ANESTHESIOLOGIST ASSISTANT

## 2025-03-07 PROCEDURE — A4312 CATH W/O BAG 2-WAY SILICONE: HCPCS | Performed by: THORACIC SURGERY (CARDIOTHORACIC VASCULAR SURGERY)

## 2025-03-07 PROCEDURE — C1768 GRAFT, VASCULAR: HCPCS | Performed by: THORACIC SURGERY (CARDIOTHORACIC VASCULAR SURGERY)

## 2025-03-07 PROCEDURE — 85018 HEMOGLOBIN: CPT

## 2025-03-07 PROCEDURE — 3600000018 HC OR TIME - INITIAL BASE CHARGE - PROCEDURE LEVEL SIX: Performed by: THORACIC SURGERY (CARDIOTHORACIC VASCULAR SURGERY)

## 2025-03-07 PROCEDURE — 37799 UNLISTED PX VASCULAR SURGERY: CPT

## 2025-03-07 PROCEDURE — 2500000005 HC RX 250 GENERAL PHARMACY W/O HCPCS: Performed by: ANESTHESIOLOGIST ASSISTANT

## 2025-03-07 PROCEDURE — 82810 BLOOD GASES O2 SAT ONLY: CPT

## 2025-03-07 PROCEDURE — 85576 BLOOD PLATELET AGGREGATION: CPT

## 2025-03-07 PROCEDURE — 82947 ASSAY GLUCOSE BLOOD QUANT: CPT

## 2025-03-07 PROCEDURE — 2720000007 HC OR 272 NO HCPCS: Performed by: THORACIC SURGERY (CARDIOTHORACIC VASCULAR SURGERY)

## 2025-03-07 PROCEDURE — 3700000002 HC GENERAL ANESTHESIA TIME - EACH INCREMENTAL 1 MINUTE: Performed by: THORACIC SURGERY (CARDIOTHORACIC VASCULAR SURGERY)

## 2025-03-07 PROCEDURE — 2500000004 HC RX 250 GENERAL PHARMACY W/ HCPCS (ALT 636 FOR OP/ED): Performed by: THORACIC SURGERY (CARDIOTHORACIC VASCULAR SURGERY)

## 2025-03-07 PROCEDURE — 99291 CRITICAL CARE FIRST HOUR: CPT

## 2025-03-07 PROCEDURE — 2500000005 HC RX 250 GENERAL PHARMACY W/O HCPCS

## 2025-03-07 PROCEDURE — 36415 COLL VENOUS BLD VENIPUNCTURE: CPT | Performed by: ANESTHESIOLOGY

## 2025-03-07 PROCEDURE — 2500000004 HC RX 250 GENERAL PHARMACY W/ HCPCS (ALT 636 FOR OP/ED): Mod: JZ

## 2025-03-07 PROCEDURE — C1713 ANCHOR/SCREW BN/BN,TIS/BN: HCPCS | Performed by: THORACIC SURGERY (CARDIOTHORACIC VASCULAR SURGERY)

## 2025-03-07 DEVICE — CARDIOPLEGIA SET: Type: IMPLANTABLE DEVICE | Site: HEART | Status: NON-FUNCTIONAL

## 2025-03-07 DEVICE — GELWEAVE GELATIN IMPREGNATED WOVEN VASCULAR PROSTHESIS STRAIGHT
Type: IMPLANTABLE DEVICE | Site: AORTA | Status: FUNCTIONAL
Brand: GELWEAVE™

## 2025-03-07 DEVICE — SCREW, SD LOCKING, 2.3 X 14MM: Type: IMPLANTABLE DEVICE | Site: HEART | Status: FUNCTIONAL

## 2025-03-07 DEVICE — PLATE, LP MANUBRIUM: Type: IMPLANTABLE DEVICE | Site: CHEST  WALL | Status: FUNCTIONAL

## 2025-03-07 DEVICE — IMPLANTABLE DEVICE: Type: IMPLANTABLE DEVICE | Site: CHEST  WALL | Status: FUNCTIONAL

## 2025-03-07 DEVICE — SCREW, SD LOCKING, 2.3 X 15MM: Type: IMPLANTABLE DEVICE | Site: HEART | Status: FUNCTIONAL

## 2025-03-07 DEVICE — PLATE, BOX, LARGE: Type: IMPLANTABLE DEVICE | Site: CHEST | Status: FUNCTIONAL

## 2025-03-07 RX ORDER — CEFAZOLIN SODIUM 2 G/100ML
2 INJECTION, SOLUTION INTRAVENOUS EVERY 8 HOURS
Status: DISCONTINUED | OUTPATIENT
Start: 2025-03-07 | End: 2025-03-07

## 2025-03-07 RX ORDER — SODIUM CHLORIDE, SODIUM LACTATE, POTASSIUM CHLORIDE, CALCIUM CHLORIDE 600; 310; 30; 20 MG/100ML; MG/100ML; MG/100ML; MG/100ML
30 INJECTION, SOLUTION INTRAVENOUS CONTINUOUS
Status: DISCONTINUED | OUTPATIENT
Start: 2025-03-07 | End: 2025-03-08

## 2025-03-07 RX ORDER — OXYCODONE HYDROCHLORIDE 5 MG/1
5 TABLET ORAL EVERY 4 HOURS PRN
Status: DISCONTINUED | OUTPATIENT
Start: 2025-03-07 | End: 2025-03-07

## 2025-03-07 RX ORDER — PROPOFOL 10 MG/ML
INJECTION, EMULSION INTRAVENOUS AS NEEDED
Status: DISCONTINUED | OUTPATIENT
Start: 2025-03-07 | End: 2025-03-07

## 2025-03-07 RX ORDER — POTASSIUM CHLORIDE 1.5 G/1.58G
20 POWDER, FOR SOLUTION ORAL EVERY 6 HOURS PRN
Status: DISCONTINUED | OUTPATIENT
Start: 2025-03-07 | End: 2025-03-08

## 2025-03-07 RX ORDER — ATORVASTATIN CALCIUM 40 MG/1
40 TABLET, FILM COATED ORAL NIGHTLY
Status: DISPENSED | OUTPATIENT
Start: 2025-03-08

## 2025-03-07 RX ORDER — POLYETHYLENE GLYCOL 3350 17 G/17G
17 POWDER, FOR SOLUTION ORAL 2 TIMES DAILY
Status: DISPENSED | OUTPATIENT
Start: 2025-03-07

## 2025-03-07 RX ORDER — FENTANYL CITRATE 50 UG/ML
INJECTION, SOLUTION INTRAMUSCULAR; INTRAVENOUS AS NEEDED
Status: DISCONTINUED | OUTPATIENT
Start: 2025-03-07 | End: 2025-03-07

## 2025-03-07 RX ORDER — ROCURONIUM BROMIDE 10 MG/ML
INJECTION, SOLUTION INTRAVENOUS AS NEEDED
Status: DISCONTINUED | OUTPATIENT
Start: 2025-03-07 | End: 2025-03-07

## 2025-03-07 RX ORDER — AMOXICILLIN 250 MG
2 CAPSULE ORAL 2 TIMES DAILY
Status: DISCONTINUED | OUTPATIENT
Start: 2025-03-07 | End: 2025-03-07

## 2025-03-07 RX ORDER — MAGNESIUM SULFATE HEPTAHYDRATE 40 MG/ML
2 INJECTION, SOLUTION INTRAVENOUS EVERY 6 HOURS PRN
Status: DISCONTINUED | OUTPATIENT
Start: 2025-03-07 | End: 2025-03-07

## 2025-03-07 RX ORDER — PROPOFOL 10 MG/ML
0-50 INJECTION, EMULSION INTRAVENOUS CONTINUOUS
Status: DISCONTINUED | OUTPATIENT
Start: 2025-03-07 | End: 2025-03-07

## 2025-03-07 RX ORDER — SODIUM CHLORIDE, SODIUM LACTATE, POTASSIUM CHLORIDE, CALCIUM CHLORIDE 600; 310; 30; 20 MG/100ML; MG/100ML; MG/100ML; MG/100ML
5 INJECTION, SOLUTION INTRAVENOUS CONTINUOUS
Status: DISCONTINUED | OUTPATIENT
Start: 2025-03-07 | End: 2025-03-07

## 2025-03-07 RX ORDER — MAGNESIUM SULFATE HEPTAHYDRATE 40 MG/ML
2 INJECTION, SOLUTION INTRAVENOUS EVERY 6 HOURS PRN
Status: DISCONTINUED | OUTPATIENT
Start: 2025-03-07 | End: 2025-03-08

## 2025-03-07 RX ORDER — NAPROXEN SODIUM 220 MG/1
81 TABLET, FILM COATED ORAL DAILY
Status: DISCONTINUED | OUTPATIENT
Start: 2025-03-08 | End: 2025-03-07

## 2025-03-07 RX ORDER — ONDANSETRON 4 MG/1
4 TABLET, FILM COATED ORAL EVERY 8 HOURS PRN
Status: DISCONTINUED | OUTPATIENT
Start: 2025-03-07 | End: 2025-03-07

## 2025-03-07 RX ORDER — LIDOCAINE HYDROCHLORIDE 10 MG/ML
INJECTION, SOLUTION INFILTRATION; PERINEURAL AS NEEDED
Status: DISCONTINUED | OUTPATIENT
Start: 2025-03-07 | End: 2025-03-07

## 2025-03-07 RX ORDER — CEFAZOLIN SODIUM 2 G/100ML
2 INJECTION, SOLUTION INTRAVENOUS EVERY 8 HOURS
Status: COMPLETED | OUTPATIENT
Start: 2025-03-07 | End: 2025-03-09

## 2025-03-07 RX ORDER — ONDANSETRON HYDROCHLORIDE 2 MG/ML
4 INJECTION, SOLUTION INTRAVENOUS EVERY 8 HOURS PRN
Status: DISCONTINUED | OUTPATIENT
Start: 2025-03-07 | End: 2025-03-07

## 2025-03-07 RX ORDER — HYDRALAZINE HYDROCHLORIDE 20 MG/ML
10 INJECTION INTRAMUSCULAR; INTRAVENOUS ONCE
Status: COMPLETED | OUTPATIENT
Start: 2025-03-07 | End: 2025-03-07

## 2025-03-07 RX ORDER — POLYETHYLENE GLYCOL 3350 17 G/17G
17 POWDER, FOR SOLUTION ORAL 2 TIMES DAILY
Status: DISCONTINUED | OUTPATIENT
Start: 2025-03-07 | End: 2025-03-07

## 2025-03-07 RX ORDER — AMOXICILLIN 250 MG
2 CAPSULE ORAL 2 TIMES DAILY
Status: DISPENSED | OUTPATIENT
Start: 2025-03-07

## 2025-03-07 RX ORDER — POTASSIUM CHLORIDE 1.5 G/1.58G
40 POWDER, FOR SOLUTION ORAL EVERY 6 HOURS PRN
Status: DISCONTINUED | OUTPATIENT
Start: 2025-03-07 | End: 2025-03-08

## 2025-03-07 RX ORDER — HYDROMORPHONE HYDROCHLORIDE 0.2 MG/ML
0.2 INJECTION INTRAMUSCULAR; INTRAVENOUS; SUBCUTANEOUS
Status: DISPENSED | OUTPATIENT
Start: 2025-03-07

## 2025-03-07 RX ORDER — NITROGLYCERIN 20 MG/100ML
INJECTION INTRAVENOUS CONTINUOUS PRN
Status: DISCONTINUED | OUTPATIENT
Start: 2025-03-07 | End: 2025-03-07

## 2025-03-07 RX ORDER — MAGNESIUM SULFATE HEPTAHYDRATE 40 MG/ML
4 INJECTION, SOLUTION INTRAVENOUS EVERY 6 HOURS PRN
Status: DISCONTINUED | OUTPATIENT
Start: 2025-03-07 | End: 2025-03-07

## 2025-03-07 RX ORDER — NALOXONE HYDROCHLORIDE 0.4 MG/ML
0.2 INJECTION, SOLUTION INTRAMUSCULAR; INTRAVENOUS; SUBCUTANEOUS EVERY 5 MIN PRN
Status: ACTIVE | OUTPATIENT
Start: 2025-03-07

## 2025-03-07 RX ORDER — HYDRALAZINE HYDROCHLORIDE 20 MG/ML
10 INJECTION INTRAMUSCULAR; INTRAVENOUS EVERY 4 HOURS PRN
Status: ACTIVE | OUTPATIENT
Start: 2025-03-07

## 2025-03-07 RX ORDER — DEXAMETHASONE SODIUM PHOSPHATE 10 MG/ML
INJECTION INTRAMUSCULAR; INTRAVENOUS AS NEEDED
Status: DISCONTINUED | OUTPATIENT
Start: 2025-03-07 | End: 2025-03-07

## 2025-03-07 RX ORDER — POTASSIUM CHLORIDE 20 MEQ/1
40 TABLET, EXTENDED RELEASE ORAL EVERY 6 HOURS PRN
Status: DISCONTINUED | OUTPATIENT
Start: 2025-03-07 | End: 2025-03-08

## 2025-03-07 RX ORDER — HYDROMORPHONE HYDROCHLORIDE 0.2 MG/ML
0.2 INJECTION INTRAMUSCULAR; INTRAVENOUS; SUBCUTANEOUS
Status: DISCONTINUED | OUTPATIENT
Start: 2025-03-07 | End: 2025-03-07

## 2025-03-07 RX ORDER — OXYCODONE HYDROCHLORIDE 10 MG/1
10 TABLET ORAL EVERY 4 HOURS PRN
Status: DISCONTINUED | OUTPATIENT
Start: 2025-03-07 | End: 2025-03-07

## 2025-03-07 RX ORDER — PANTOPRAZOLE SODIUM 40 MG/10ML
40 INJECTION, POWDER, LYOPHILIZED, FOR SOLUTION INTRAVENOUS
Status: DISCONTINUED | OUTPATIENT
Start: 2025-03-08 | End: 2025-03-07

## 2025-03-07 RX ORDER — NALOXONE HYDROCHLORIDE 0.4 MG/ML
0.2 INJECTION, SOLUTION INTRAMUSCULAR; INTRAVENOUS; SUBCUTANEOUS EVERY 5 MIN PRN
Status: DISCONTINUED | OUTPATIENT
Start: 2025-03-07 | End: 2025-03-07

## 2025-03-07 RX ORDER — ACETAMINOPHEN 325 MG/1
650 TABLET ORAL EVERY 6 HOURS
Status: DISCONTINUED | OUTPATIENT
Start: 2025-03-07 | End: 2025-03-07

## 2025-03-07 RX ORDER — PANTOPRAZOLE SODIUM 40 MG/1
40 TABLET, DELAYED RELEASE ORAL
Status: DISCONTINUED | OUTPATIENT
Start: 2025-03-08 | End: 2025-03-07

## 2025-03-07 RX ORDER — POTASSIUM CHLORIDE 20 MEQ/1
20 TABLET, EXTENDED RELEASE ORAL EVERY 6 HOURS PRN
Status: DISCONTINUED | OUTPATIENT
Start: 2025-03-07 | End: 2025-03-07

## 2025-03-07 RX ORDER — CEFAZOLIN 1 G/1
INJECTION, POWDER, FOR SOLUTION INTRAVENOUS AS NEEDED
Status: DISCONTINUED | OUTPATIENT
Start: 2025-03-07 | End: 2025-03-07

## 2025-03-07 RX ORDER — DEXTROSE 50 % IN WATER (D50W) INTRAVENOUS SYRINGE
25
Status: ACTIVE | OUTPATIENT
Start: 2025-03-07

## 2025-03-07 RX ORDER — POTASSIUM CHLORIDE 1.5 G/1.58G
20 POWDER, FOR SOLUTION ORAL EVERY 6 HOURS PRN
Status: DISCONTINUED | OUTPATIENT
Start: 2025-03-07 | End: 2025-03-07

## 2025-03-07 RX ORDER — ONDANSETRON HYDROCHLORIDE 2 MG/ML
4 INJECTION, SOLUTION INTRAVENOUS EVERY 8 HOURS PRN
Status: ACTIVE | OUTPATIENT
Start: 2025-03-07

## 2025-03-07 RX ORDER — INDOMETHACIN 25 MG/1
CAPSULE ORAL AS NEEDED
Status: DISCONTINUED | OUTPATIENT
Start: 2025-03-07 | End: 2025-03-07

## 2025-03-07 RX ORDER — POTASSIUM CHLORIDE 20 MEQ/1
20 TABLET, EXTENDED RELEASE ORAL EVERY 6 HOURS PRN
Status: DISCONTINUED | OUTPATIENT
Start: 2025-03-07 | End: 2025-03-08

## 2025-03-07 RX ORDER — CALCIUM CHLORIDE INJECTION 100 MG/ML
INJECTION, SOLUTION INTRAVENOUS AS NEEDED
Status: DISCONTINUED | OUTPATIENT
Start: 2025-03-07 | End: 2025-03-07

## 2025-03-07 RX ORDER — INSULIN LISPRO 100 [IU]/ML
0-15 INJECTION, SOLUTION INTRAVENOUS; SUBCUTANEOUS EVERY 4 HOURS
Status: DISCONTINUED | OUTPATIENT
Start: 2025-03-07 | End: 2025-03-08

## 2025-03-07 RX ORDER — ROPIVACAINE HYDROCHLORIDE 5 MG/ML
INJECTION, SOLUTION EPIDURAL; INFILTRATION; PERINEURAL AS NEEDED
Status: DISCONTINUED | OUTPATIENT
Start: 2025-03-07 | End: 2025-03-07

## 2025-03-07 RX ORDER — HEPARIN SODIUM 1000 [USP'U]/ML
INJECTION, SOLUTION INTRAVENOUS; SUBCUTANEOUS AS NEEDED
Status: DISCONTINUED | OUTPATIENT
Start: 2025-03-07 | End: 2025-03-07

## 2025-03-07 RX ORDER — NITROGLYCERIN 40 MG/100ML
INJECTION INTRAVENOUS AS NEEDED
Status: DISCONTINUED | OUTPATIENT
Start: 2025-03-07 | End: 2025-03-07

## 2025-03-07 RX ORDER — ACETAMINOPHEN 325 MG/1
650 TABLET ORAL EVERY 6 HOURS
Status: DISPENSED | OUTPATIENT
Start: 2025-03-07

## 2025-03-07 RX ORDER — POTASSIUM CHLORIDE 29.8 MG/ML
40 INJECTION INTRAVENOUS EVERY 6 HOURS PRN
Status: DISCONTINUED | OUTPATIENT
Start: 2025-03-07 | End: 2025-03-07

## 2025-03-07 RX ORDER — POTASSIUM CHLORIDE 20 MEQ/1
40 TABLET, EXTENDED RELEASE ORAL EVERY 6 HOURS PRN
Status: DISCONTINUED | OUTPATIENT
Start: 2025-03-07 | End: 2025-03-07

## 2025-03-07 RX ORDER — PHENYLEPHRINE 10 MG/250 ML(40 MCG/ML)IN 0.9 % SOD.CHLORIDE INTRAVENOUS
CONTINUOUS PRN
Status: DISCONTINUED | OUTPATIENT
Start: 2025-03-07 | End: 2025-03-07

## 2025-03-07 RX ORDER — PHENYLEPHRINE HYDROCHLORIDE 10 MG/ML
INJECTION INTRAVENOUS AS NEEDED
Status: DISCONTINUED | OUTPATIENT
Start: 2025-03-07 | End: 2025-03-07

## 2025-03-07 RX ORDER — POTASSIUM CHLORIDE 1.5 G/1.58G
40 POWDER, FOR SOLUTION ORAL EVERY 6 HOURS PRN
Status: DISCONTINUED | OUTPATIENT
Start: 2025-03-07 | End: 2025-03-07

## 2025-03-07 RX ORDER — VANCOMYCIN HYDROCHLORIDE 1 G/20ML
INJECTION, POWDER, LYOPHILIZED, FOR SOLUTION INTRAVENOUS AS NEEDED
Status: DISCONTINUED | OUTPATIENT
Start: 2025-03-07 | End: 2025-03-07 | Stop reason: HOSPADM

## 2025-03-07 RX ORDER — OXYCODONE HYDROCHLORIDE 5 MG/1
5 TABLET ORAL EVERY 4 HOURS PRN
Status: DISPENSED | OUTPATIENT
Start: 2025-03-07

## 2025-03-07 RX ORDER — SODIUM CHLORIDE, SODIUM LACTATE, POTASSIUM CHLORIDE, CALCIUM CHLORIDE 600; 310; 30; 20 MG/100ML; MG/100ML; MG/100ML; MG/100ML
30 INJECTION, SOLUTION INTRAVENOUS CONTINUOUS
Status: DISCONTINUED | OUTPATIENT
Start: 2025-03-07 | End: 2025-03-07

## 2025-03-07 RX ORDER — OXYCODONE HYDROCHLORIDE 10 MG/1
10 TABLET ORAL EVERY 4 HOURS PRN
Status: DISPENSED | OUTPATIENT
Start: 2025-03-07

## 2025-03-07 RX ORDER — CALCIUM GLUCONATE 20 MG/ML
1 INJECTION, SOLUTION INTRAVENOUS EVERY 6 HOURS PRN
Status: DISCONTINUED | OUTPATIENT
Start: 2025-03-07 | End: 2025-03-07

## 2025-03-07 RX ORDER — CALCIUM GLUCONATE 20 MG/ML
2 INJECTION, SOLUTION INTRAVENOUS EVERY 6 HOURS PRN
Status: DISCONTINUED | OUTPATIENT
Start: 2025-03-07 | End: 2025-03-07

## 2025-03-07 RX ORDER — SODIUM CHLORIDE, SODIUM GLUCONATE, SODIUM ACETATE, POTASSIUM CHLORIDE AND MAGNESIUM CHLORIDE 30; 37; 368; 526; 502 MG/100ML; MG/100ML; MG/100ML; MG/100ML; MG/100ML
INJECTION, SOLUTION INTRAVENOUS CONTINUOUS PRN
Status: DISCONTINUED | OUTPATIENT
Start: 2025-03-07 | End: 2025-03-07

## 2025-03-07 RX ORDER — KETAMINE HYDROCHLORIDE 10 MG/ML
INJECTION, SOLUTION INTRAMUSCULAR; INTRAVENOUS AS NEEDED
Status: DISCONTINUED | OUTPATIENT
Start: 2025-03-07 | End: 2025-03-07

## 2025-03-07 RX ORDER — PROTAMINE SULFATE 10 MG/ML
INJECTION, SOLUTION INTRAVENOUS AS NEEDED
Status: DISCONTINUED | OUTPATIENT
Start: 2025-03-07 | End: 2025-03-07

## 2025-03-07 RX ORDER — LIDOCAINE HYDROCHLORIDE 20 MG/ML
INJECTION, SOLUTION INFILTRATION; PERINEURAL AS NEEDED
Status: DISCONTINUED | OUTPATIENT
Start: 2025-03-07 | End: 2025-03-07

## 2025-03-07 RX ORDER — CALCIUM GLUCONATE 20 MG/ML
2 INJECTION, SOLUTION INTRAVENOUS EVERY 6 HOURS PRN
Status: DISCONTINUED | OUTPATIENT
Start: 2025-03-07 | End: 2025-03-08

## 2025-03-07 RX ORDER — SODIUM CHLORIDE, SODIUM LACTATE, POTASSIUM CHLORIDE, CALCIUM CHLORIDE 600; 310; 30; 20 MG/100ML; MG/100ML; MG/100ML; MG/100ML
5 INJECTION, SOLUTION INTRAVENOUS CONTINUOUS
Status: DISCONTINUED | OUTPATIENT
Start: 2025-03-07 | End: 2025-03-08

## 2025-03-07 RX ORDER — MIDAZOLAM HYDROCHLORIDE 1 MG/ML
INJECTION INTRAMUSCULAR; INTRAVENOUS AS NEEDED
Status: DISCONTINUED | OUTPATIENT
Start: 2025-03-07 | End: 2025-03-07

## 2025-03-07 RX ORDER — MAGNESIUM SULFATE 1 G/100ML
1 INJECTION INTRAVENOUS EVERY 6 HOURS PRN
Status: DISCONTINUED | OUTPATIENT
Start: 2025-03-07 | End: 2025-03-08

## 2025-03-07 RX ORDER — SODIUM CHLORIDE 0.9 G/100ML
INJECTION, SOLUTION IRRIGATION AS NEEDED
Status: DISCONTINUED | OUTPATIENT
Start: 2025-03-07 | End: 2025-03-07 | Stop reason: HOSPADM

## 2025-03-07 RX ORDER — NITROGLYCERIN 20 MG/100ML
0-200 INJECTION INTRAVENOUS CONTINUOUS
Status: DISCONTINUED | OUTPATIENT
Start: 2025-03-07 | End: 2025-03-07

## 2025-03-07 RX ORDER — NAPROXEN SODIUM 220 MG/1
81 TABLET, FILM COATED ORAL DAILY
Status: DISCONTINUED | OUTPATIENT
Start: 2025-03-07 | End: 2025-03-08

## 2025-03-07 RX ORDER — CALCIUM GLUCONATE 20 MG/ML
1 INJECTION, SOLUTION INTRAVENOUS EVERY 6 HOURS PRN
Status: DISCONTINUED | OUTPATIENT
Start: 2025-03-07 | End: 2025-03-08

## 2025-03-07 RX ORDER — ONDANSETRON 4 MG/1
4 TABLET, FILM COATED ORAL EVERY 8 HOURS PRN
Status: ACTIVE | OUTPATIENT
Start: 2025-03-07

## 2025-03-07 RX ORDER — POTASSIUM CHLORIDE 14.9 MG/ML
20 INJECTION INTRAVENOUS EVERY 6 HOURS PRN
Status: DISCONTINUED | OUTPATIENT
Start: 2025-03-07 | End: 2025-03-07

## 2025-03-07 RX ORDER — MAGNESIUM SULFATE HEPTAHYDRATE 500 MG/ML
INJECTION, SOLUTION INTRAMUSCULAR; INTRAVENOUS AS NEEDED
Status: DISCONTINUED | OUTPATIENT
Start: 2025-03-07 | End: 2025-03-07

## 2025-03-07 RX ADMIN — INSULIN LISPRO 5 UNITS: 100 INJECTION, SOLUTION INTRAVENOUS; SUBCUTANEOUS at 20:12

## 2025-03-07 RX ADMIN — ACETAMINOPHEN 650 MG: 325 TABLET ORAL at 20:33

## 2025-03-07 RX ADMIN — ROCURONIUM BROMIDE 30 MG: 10 INJECTION INTRAVENOUS at 10:29

## 2025-03-07 RX ADMIN — CEFAZOLIN SODIUM 2 G: 2 INJECTION, SOLUTION INTRAVENOUS at 15:51

## 2025-03-07 RX ADMIN — PROPOFOL 150 MG: 10 INJECTION, EMULSION INTRAVENOUS at 07:47

## 2025-03-07 RX ADMIN — MAGNESIUM SULFATE HEPTAHYDRATE 2 G: 500 INJECTION, SOLUTION INTRAMUSCULAR; INTRAVENOUS at 10:06

## 2025-03-07 RX ADMIN — PROTAMINE SULFATE 25 MG: 10 INJECTION, SOLUTION INTRAVENOUS at 11:01

## 2025-03-07 RX ADMIN — NOREPINEPHRINE BITARTRATE 8 MCG: 1 INJECTION, SOLUTION, CONCENTRATE INTRAVENOUS at 10:24

## 2025-03-07 RX ADMIN — PHENYLEPHRINE HYDROCHLORIDE 80 MCG: 10 INJECTION INTRAVENOUS at 08:38

## 2025-03-07 RX ADMIN — HYDROMORPHONE HYDROCHLORIDE 0.2 MG: 0.2 INJECTION, SOLUTION INTRAMUSCULAR; INTRAVENOUS; SUBCUTANEOUS at 17:17

## 2025-03-07 RX ADMIN — CALCIUM CHLORIDE 1 G: 100 INJECTION, SOLUTION INTRAVENOUS at 10:12

## 2025-03-07 RX ADMIN — ROPIVACAINE HYDROCHLORIDE 10 ML: 5 INJECTION, SOLUTION EPIDURAL; INFILTRATION; PERINEURAL at 12:12

## 2025-03-07 RX ADMIN — NOREPINEPHRINE BITARTRATE 16 MCG: 1 INJECTION, SOLUTION, CONCENTRATE INTRAVENOUS at 08:57

## 2025-03-07 RX ADMIN — CEFAZOLIN SODIUM 2 G: 2 INJECTION, SOLUTION INTRAVENOUS at 23:59

## 2025-03-07 RX ADMIN — SUGAMMADEX 200 MG: 100 INJECTION, SOLUTION INTRAVENOUS at 12:38

## 2025-03-07 RX ADMIN — TRANEXAMIC ACID 1560 MG: 100 INJECTION INTRAVENOUS at 08:37

## 2025-03-07 RX ADMIN — FENTANYL CITRATE 250 MCG: 50 INJECTION, SOLUTION INTRAMUSCULAR; INTRAVENOUS at 08:50

## 2025-03-07 RX ADMIN — LIDOCAINE HYDROCHLORIDE 100 MG: 20 INJECTION, SOLUTION INFILTRATION; PERINEURAL at 07:47

## 2025-03-07 RX ADMIN — PROPOFOL 50 MG: 10 INJECTION, EMULSION INTRAVENOUS at 09:17

## 2025-03-07 RX ADMIN — POLYETHYLENE GLYCOL 3350 17 G: 17 POWDER, FOR SOLUTION ORAL at 13:57

## 2025-03-07 RX ADMIN — PROPOFOL 40 MCG/KG/MIN: 10 INJECTION, EMULSION INTRAVENOUS at 11:26

## 2025-03-07 RX ADMIN — MIDAZOLAM HYDROCHLORIDE 2 MG: 1 INJECTION, SOLUTION INTRAMUSCULAR; INTRAVENOUS at 07:39

## 2025-03-07 RX ADMIN — ROPIVACAINE HYDROCHLORIDE 20 ML: 5 INJECTION, SOLUTION EPIDURAL; INFILTRATION; PERINEURAL at 12:10

## 2025-03-07 RX ADMIN — SENNOSIDES AND DOCUSATE SODIUM 2 TABLET: 50; 8.6 TABLET ORAL at 20:34

## 2025-03-07 RX ADMIN — NITROGLYCERIN 50 MCG: 10 INJECTION INTRAVENOUS at 10:26

## 2025-03-07 RX ADMIN — ASPIRIN 81 MG: 81 TABLET, CHEWABLE ORAL at 13:57

## 2025-03-07 RX ADMIN — HYDRALAZINE HYDROCHLORIDE 10 MG: 20 INJECTION INTRAMUSCULAR; INTRAVENOUS at 18:34

## 2025-03-07 RX ADMIN — NITROGLYCERIN 100 MCG: 10 INJECTION INTRAVENOUS at 10:17

## 2025-03-07 RX ADMIN — POLYETHYLENE GLYCOL 3350 17 G: 17 POWDER, FOR SOLUTION ORAL at 20:33

## 2025-03-07 RX ADMIN — NITROGLYCERIN 100 MCG: 10 INJECTION INTRAVENOUS at 08:51

## 2025-03-07 RX ADMIN — ROCURONIUM BROMIDE 100 MG: 10 INJECTION INTRAVENOUS at 07:47

## 2025-03-07 RX ADMIN — Medication 50 MG: at 07:47

## 2025-03-07 RX ADMIN — NITROGLYCERIN 100 MCG: 10 INJECTION INTRAVENOUS at 10:28

## 2025-03-07 RX ADMIN — SODIUM BICARBONATE 25 MEQ: 84 INJECTION, SOLUTION INTRAVENOUS at 09:47

## 2025-03-07 RX ADMIN — ACETAMINOPHEN 650 MG: 325 TABLET ORAL at 13:57

## 2025-03-07 RX ADMIN — CEFAZOLIN 2 G: 1 INJECTION, POWDER, FOR SOLUTION INTRAMUSCULAR; INTRAVENOUS at 08:30

## 2025-03-07 RX ADMIN — ROPIVACAINE HYDROCHLORIDE 20 ML: 5 INJECTION, SOLUTION EPIDURAL; INFILTRATION; PERINEURAL at 12:09

## 2025-03-07 RX ADMIN — DEXAMETHASONE SODIUM PHOSPHATE 4 MG: 10 INJECTION INTRAMUSCULAR; INTRAVENOUS at 12:11

## 2025-03-07 RX ADMIN — NITROGLYCERIN 5 MCG/MIN: 20 INJECTION INTRAVENOUS at 10:15

## 2025-03-07 RX ADMIN — HEPARIN SODIUM 40000 UNITS: 1000 INJECTION, SOLUTION INTRAVENOUS; SUBCUTANEOUS at 09:03

## 2025-03-07 RX ADMIN — SENNOSIDES AND DOCUSATE SODIUM 2 TABLET: 50; 8.6 TABLET ORAL at 13:57

## 2025-03-07 RX ADMIN — SODIUM CHLORIDE, SODIUM GLUCONATE, SODIUM ACETATE, POTASSIUM CHLORIDE AND MAGNESIUM CHLORIDE: 526; 502; 368; 37; 30 INJECTION, SOLUTION INTRAVENOUS at 07:19

## 2025-03-07 RX ADMIN — TRANEXAMIC ACID 3 MG/KG/HR: 100 INJECTION INTRAVENOUS at 08:57

## 2025-03-07 RX ADMIN — FENTANYL CITRATE 100 MCG: 50 INJECTION, SOLUTION INTRAMUSCULAR; INTRAVENOUS at 12:05

## 2025-03-07 RX ADMIN — PHENYLEPHRINE-NACL IV SOLUTION 10 MG/250ML-0.9% 0.4 MCG/KG/MIN: 10-0.9/25 SOLUTION at 11:22

## 2025-03-07 RX ADMIN — PHENYLEPHRINE HYDROCHLORIDE 120 MCG: 10 INJECTION INTRAVENOUS at 08:03

## 2025-03-07 RX ADMIN — PROTAMINE SULFATE 400 MG: 10 INJECTION, SOLUTION INTRAVENOUS at 10:28

## 2025-03-07 RX ADMIN — FENTANYL CITRATE 250 MCG: 50 INJECTION, SOLUTION INTRAMUSCULAR; INTRAVENOUS at 08:33

## 2025-03-07 RX ADMIN — VANCOMYCIN HYDROCHLORIDE 1500 MG: 1025 INJECTION, POWDER, FOR SOLUTION INTRAVENOUS; ORAL at 08:43

## 2025-03-07 RX ADMIN — LIDOCAINE HYDROCHLORIDE 100 MG: 10 INJECTION, SOLUTION INFILTRATION; PERINEURAL at 10:05

## 2025-03-07 RX ADMIN — ROCURONIUM BROMIDE 50 MG: 10 INJECTION INTRAVENOUS at 09:25

## 2025-03-07 SDOH — HEALTH STABILITY: MENTAL HEALTH: CURRENT SMOKER: 0

## 2025-03-07 ASSESSMENT — PAIN - FUNCTIONAL ASSESSMENT
PAIN_FUNCTIONAL_ASSESSMENT: 0-10
PAIN_FUNCTIONAL_ASSESSMENT: 0-10
PAIN_FUNCTIONAL_ASSESSMENT: CPOT (CRITICAL CARE PAIN OBSERVATION TOOL)
PAIN_FUNCTIONAL_ASSESSMENT: 0-10
PAIN_FUNCTIONAL_ASSESSMENT: CPOT (CRITICAL CARE PAIN OBSERVATION TOOL)

## 2025-03-07 ASSESSMENT — COLUMBIA-SUICIDE SEVERITY RATING SCALE - C-SSRS
2. HAVE YOU ACTUALLY HAD ANY THOUGHTS OF KILLING YOURSELF?: NO
1. IN THE PAST MONTH, HAVE YOU WISHED YOU WERE DEAD OR WISHED YOU COULD GO TO SLEEP AND NOT WAKE UP?: NO
6. HAVE YOU EVER DONE ANYTHING, STARTED TO DO ANYTHING, OR PREPARED TO DO ANYTHING TO END YOUR LIFE?: NO

## 2025-03-07 ASSESSMENT — PAIN SCALES - GENERAL
PAINLEVEL_OUTOF10: 0 - NO PAIN
PAINLEVEL_OUTOF10: 8
PAINLEVEL_OUTOF10: 0 - NO PAIN

## 2025-03-07 NOTE — H&P
"CTICU History & Physical    Subjective   HPI:  Arvind \"Geremias\" Rick is a 64 yoM w/ PMHx HTN, prostate CA w/ spinal mets, ascending aortic aneurysm who presents to the CTICU s/p ascending aortic repair with Dr. Flores 3/7.    Cardiac Testing:   EKG 1/24/25  Normal sinus rhythm  Low voltage QRS  Left anterior fascicular block  Cannot rule out Inferior infarct (cited on or before 23-NOV-2022)  Abnormal ECG     Left Heart Cath 1/24/25  CONCLUSIONS:   1. No evidence of obstructive coronary artery disease in a right dominant system.     Echo 9/27/24  CONCLUSIONS:   1. Left ventricular ejection fraction is normal, calculated by Ruffin's biplane at 64%.   2. Spectral Doppler shows an impaired relaxation pattern of left ventricular diastolic filling.   3. There is normal right ventricular global systolic function.   4. There is moderate dilatation of the ascending aorta.     Procedure/Surgeon: Ascending aortic aneurysm repair/Dr. Flores  Frontliner/Anesthesia: Dr. Carlos/Otoniel Hu  Out of OR Time (document on ventilator card): 1220     OR Course/Issues: N/A     CPB time: 51  Cross clamp time: 44  Circ arrest time: N/A  Echo Pre/Post: Normal BiV, mild MR, mild IL  Chest Tubes/Drains: 2x meds   Temporary wires location/setting: VVI @ 50      Fluids  Crystalloid: 2.5L  Colloid: 0  Cellsaver: 656  Products: 0  EBL: 250  UOP: 560     Anesthesia  Intubation: Easy mask with OA, easy airway MAC 4/grade 2  Intravenous Access: L brachial francheska, RIJ MAC, PIVs   AICD: n/a  PPM: n/a  Regional anesthesia: B/l SAP and R TAP ss block  Benzodiazepine dose/last administration: 2mg midazolam total  Opioid dose/last administration: 500mcg fentanyl total/1205  NMB dose/last administration: 180 mg rocuronium total  TOF/ reversal given: Sugammadex 200mg  Antibiotic time: Ancef 830  Temperature on admission to ICU: 35.4    Past Medical History:   Diagnosis Date    Adverse effect of anesthesia     Reports significant hypotension following OR " during prostate surgery, states during PACU and was told it was during extubation of anesthesia.    Anesthesia of skin     Numbness and tingling    Arthritis     hips    Carpal tunnel syndrome 11/19/2024    Chronic bronchitis (Multi)     High prostate specific antigen (PSA) 02/17/2023    History of prostate cancer 03/17/2023    Hypertension     Numbness and tingling sensation of skin 11/19/2024    Pain in left shoulder 10/13/2020    Shoulder pain, left    Peripheral neuropathy     Personal history of other specified conditions 11/22/2021    History of elevated prostate specific antigen (PSA)    Postoperative complication 11/19/2024    Prostate cancer (Multi)     2021 s/p prostatectomy and Radiation therapy    Spondylosis without myelopathy or radiculopathy, lumbar region 08/26/2022    Thoracic ascending aortic aneurysm (CMS-HCC)     CT Chest scan on 2/7/25-measuring 5.0 cm     Past Surgical History:   Procedure Laterality Date    CARDIAC CATHETERIZATION N/A 01/24/2025    Procedure: Left & Right Heart Cath w Angiography & LV;  Surgeon: Santi Monroe MD;  Location: [place] [address] Cardiac Cath Lab;  Service: Cardiovascular;  Laterality: N/A;  request 1/24 or 1/31    OTHER SURGICAL HISTORY  03/23/2020    Carpal tunnel surgery    OTHER SURGICAL HISTORY  12/22/2021    Robotic-assisted laparoscopic radical prostatectomy     Medications Prior to Admission   Medication Sig Dispense Refill Last Dose/Taking    amLODIPine (Norvasc) 5 mg tablet TAKE 2 TABLETS DAILY. (Patient taking differently: Take 2 tablets (10 mg) by mouth once daily. Takes #1 tablet twice daily) 180 tablet 3 3/6/2025    chlorhexidine (Hibiclens) 4 % external liquid Use for 5 days prior to surgery as body wash, do not use on face or genital region 473 mL 0 3/7/2025 Morning    chlorhexidine (Peridex) 0.12 % solution Use 15 mL in the mouth or throat if needed for wound care for up to 2 days. Use as mouth wash for night before and morning of surgery 473 mL 0  3/7/2025 Morning    losartan (Cozaar) 50 mg tablet TAKE 1 TABLET BY MOUTH 2 TIMES A  tablet 3 3/6/2025     Patient has no known allergies.  Social History     Tobacco Use    Smoking status: Never     Passive exposure: Never    Smokeless tobacco: Never   Substance Use Topics    Alcohol use: Yes     Comment: once every other week    Drug use: Never     Family History   Problem Relation Name Age of Onset    Deep vein thrombosis Mother      Other (heart valve replacement) Father      Other (varicose veins) Sister      Heart attack Paternal Grandfather         Review of Systems:  UTO    Objective   Vitals:  Most Recent:  Vitals:    03/07/25 0609   BP: 160/82   Pulse: 72   Resp: 16   Temp: 36 °C (96.8 °F)   SpO2: 98%       24hr Min/Max:  Temp  Min: 36 °C (96.8 °F)  Max: 36 °C (96.8 °F)  Pulse  Min: 72  Max: 72  BP  Min: 160/82  Max: 160/82  Resp  Min: 16  Max: 16  SpO2  Min: 98 %  Max: 98 %    I/O:  No intake/output data recorded.    LDA:  CVC 03/07/25 Double lumen Right Internal jugular (Active)   Placement Date/Time: 03/07/25 (c) 0752   Hand Hygiene Performed Prior to CVC Insertion: Yes  Site Prep: Chlorhexidine   Site Prep Agent has Completely Dried Before Insertion: Yes  All 5 Sterile Barriers Used (Gloves, Gown, Cap, Mask, Large Sterile Harriet...   Number of days: 0       Arterial Line 03/07/25 Left Brachial (Active)   Placement Date/Time: 03/07/25 (c) 0757   Size: 20 G  Orientation: Left  Location: Brachial  Securement Method: Transparent dressing  Patient Tolerance: Tolerated well   Number of days: 0       ETT  (Active)   Placement Date/Time: 03/07/25 (c) 9584   Mask Ventilation: Vent by mask + OA or adjuvant +/- NMBA   Number of days: 0       Urethral Catheter Non-latex;Temperature probe (Active)   Placement Date/Time: 03/07/25 0812   Placed by: ZANA  Hand Hygiene Completed: Yes  Catheter Type: Non-latex;Temperature probe  Catheter Balloon Size: 10 mL  Urine Returned: Yes   Number of days: 0       Physical  "Exam:   - GENERAL: Sedated. No acute distress. Well-nourished.  - NEUROLOGIC: No focal neurological deficits. PERRL  - LUNGS: Diminished bases. No accessory muscle use. Intubated  - CARDIOVASCULAR: Regular rate and rhythm.  Epicardial wires  - ABDOMEN: Soft, non-tender and non-distended. No palpable masses.  - : Clear, yellow urine in mackay.   - EXTREMITIES: No edema. Non-tender.?  - SKIN: No rashes or lesions. Warm.  - PSYCHIATRIC: Calm, unable to assess sedated.     Lab Review:            No lab exists for component: \"LABALBU\"      Results from last 7 days   Lab Units 03/07/25  1039   POCT PH, ARTERIAL pH 7.39   POCT PCO2, ARTERIAL mm Hg 41   POCT PO2, ARTERIAL mm Hg 419*   POCT HCO3 CALCULATED, ARTERIAL mmol/L 24.8   POCT BASE EXCESS, ARTERIAL mmol/L -0.2       Most recent labs and imaging reviewed.    Daily Risk Screen  Intubated: Wean to extubation today  Central line: Maintain for vasoactive medications  Mackay: Maintain for strict I/Os    Assessment/Plan     Assessment:  Arvind \"Geremias\" Rick is a 64 yoM w/ PMHx HTN, prostate CA w/ spinal mets, ascending aortic aneurysm who presents to the CTICU s/p ascending aortic repair with Dr. Flores 3/7.     Plan:  NEURO:  No significant PMHx. Patient is intubated and sedated on propofol infusion. Acute post operative pain.  -->  - Serial neuro and pain assessments   - Continue propofol until NMB reversal, then daily sedation vacation at minimum   - NMB reversal when normothermic and hemodynamically stable.    - Scheduled Tylenol 650 mg q6hr   - PRN oxycodone  - PRN dilaudid for pain   - PT Consult, OOB to chair as tolerated, chair position if not tolerated   - CAM ICU score qshift  - Sleep/wake cycle hygiene     CV:  Patient has a history of HTN, ascending aortic aneurysm now s/p repair with Dr. Flores 3/7. Pre/Post EF: normal BiV Arrived to CTICU on nitroglycerin gtt @5. A/V epicardial wires set VVI @ 50 -->  - Maintain goal SBP < 120  - Mixed venous and CI Q4H  - " Volume resuscitate as clinically indicated  - Maintain epicardial wires set VVI @ 50  - ASA today  - Hold home amlodipine, losartan     PULM:  No history of pulmonary disease.  Currently intubated on ventilator. Post operative atelectasis. Chest tubes meds x2 to wall suction. -->  - F/u post op CXR  - Once reversed, wean ventilator settings towards CPAP & extubation   - Wean FiO2 maintaining SpO2 >92%.   - IS q1h and OOB to chair when extubated  - Chest tubes to wall suction.    GI:  PMH of hypogonadism, follows with PCP, no sxs and not on meds.  OG in place.-->  - Continue PPI until extubated  - NPO, will perform bedside swallow eval post extubation   - Colace/senna BID and miralax BID     :  CSA-DANIELLE Risk Score 3.  No history of renal disease, baseline CRE 0.7-0.9. Creatinine stable post-op. Mackay in place and making adequate UOP. -->  - Continue mackay catheter for strict I/Os.  - Goal UOP 0.5ml/kg/hr  - RFP as clinically indicated  - Replete electrolytes per CTICU protocol      Variable Points   Male 1   Age < 40 0   Age 41-60 1   Age 61-80 2   Age > 81 3   CKD 1   NYHA > 2 1   Previous cardiac Surgery 1     Points Pre-Op ICU Admit   0-1 Low Low   2-3 Medium Low   4 High Low   5-9 High Medium   > 10 High High        ENDO:  No PMHx  -->  - Maintain BG <180, insulin per CTICU protocol     HEME:  H/o prostate CA w/ mets to spine, s/p rad 8/2022. Acute blood loss anemia and thrombocytopenia. -->    - Monitor drain output volume and characteristics  - CBC, coags, and fibrinogen post op and as clinically indicated  - Start ASA  - SQH tomorrow   - SCDs for DVT prophylaxis.  - Last type and screen: 3/7     ID:  Afebrile, no current indications of infection. MRSA neg. -->  - Trend temp q4h  - Periop cefazolin x 48hrs     Skin:  No active skin issues.  - preventative Mepilex dressings in place on sacrum and heels  - change preventative Mepilex weekly or more frequently as indicated (when moist/soiled)   - every shift skin  assessment per nursing and weekly ICU skin rounds  - moisture barrier to be applied with loc care  - active skin problems addressed with nursing on daily rounds     Proph:  SCDs  PPI     G:  Line  Right IJ MAC w Minimac placed 3/7  Left brachial a-line placed 3/7    F: Family: will update at bedside postoperatively.     A,B,C,D,E,F,G: reviewed    03/07/25 at 11:47 AM - Vadim Mann MD  Dispo: CTICU care for now.    CTICU TEAM PHONE [phone]

## 2025-03-07 NOTE — ANESTHESIA PROCEDURE NOTES
Peripheral IV  Date/Time: 3/7/2025 7:30 AM  Inserted by: GREGORIA Levine    Placement  Needle size: 16 G  Laterality: right  Location: wrist  Local anesthetic: injectable  Site prep: alcohol  Technique: anatomical landmarks  Attempts: 1

## 2025-03-07 NOTE — ANESTHESIA PREPROCEDURE EVALUATION
"Patient: Arvind Cabrera \"Geremias\"    Procedure Information       Date/Time: 03/07/25 0715    Procedure: REPAIR, AORTA, ASCENDING THORACIC (Chest)    Location: [facility] OR 21 / [facility]    Surgeons: Daysi Tavarez MD          Relevant Problems   Cardiac   (+) Aneurysm of ascending aorta without rupture (CMS-HCC)   (+) Aortic aneurysm (CMS-HCC)   (+) Hypertension      Neuro   (+) Carpal tunnel syndrome, bilateral   (+) Ulnar nerve entrapment at elbow      /Renal   (+) Benign prostatic hyperplasia without urinary obstruction   (+) Malignant neoplasm of prostate (Multi)      Musculoskeletal   (+) Carpal tunnel syndrome, bilateral      ID   (+) Genital herpes simplex     PAT Note reviewed and appreciated  Of note, patient recovered from URI which started 2/14/25. Today he feels well and at baseline. No pulmonary symptoms in preop.     Echo: PHYSICIAN INTERPRETATION:  Left Ventricle: Left ventricular ejection fraction is normal, calculated by Ruffin's biplane at 64%. There are no regional wall motion abnormalities. The left ventricular cavity size is normal. Spectral Doppler shows an impaired relaxation pattern of left ventricular diastolic filling.  Left Atrium: The left atrium is normal in size. A bubble study using agitated saline was not performed.  Right Ventricle: The right ventricle is normal in size. There is normal right ventricular global systolic function.  Right Atrium: The right atrium is normal in size.  Aortic Valve: The aortic valve is trileaflet. The aortic valve dimensionless index is 0.73. There is no evidence of aortic valve regurgitation. The peak instantaneous gradient of the aortic valve is 12.5 mmHg. The mean gradient of the aortic valve is 6.0 mmHg.  Mitral Valve: The mitral valve is normal in structure. There is trace mitral valve regurgitation.  Tricuspid Valve: The tricuspid valve is structurally normal. There is trace tricuspid regurgitation.  Pulmonic Valve: " The pulmonic valve is not well visualized. There is trace pulmonic valve regurgitation.  Pericardium: Trivial pericardial effusion.  Aorta: The aortic root is abnormal. There is moderate dilatation of the ascending aorta.  In comparison to the previous echocardiogram(s): There are no prior studies on this patient for comparison purposes.      CONCLUSIONS:   1. Left ventricular ejection fraction is normal, calculated by Ruffin's biplane at 64%.   2. Spectral Doppler shows an impaired relaxation pattern of left ventricular diastolic filling.   3. There is normal right ventricular global systolic function.   4. There is moderate dilatation of the ascending aorta.    CT Chest:  Potential study limitations:  None.     THORACIC AORTA:  There is stable aneurysmal dilatation of the ascending thoracic aorta  measuring 4.8 cm. The sinotubular junction is  preserved.  There is no evidence for acute aortic pathology, such as dissection,  intramural hematoma, or contained rupture. The arch vessel branching  pattern is  bovine (i.e., common trunk of the innominate and left  common carotid artery).  All of the arch branch vessels appear widely  patent in their proximal portions. Visualized proximal abdominal  aorta is normal.      REPRESENTATIVE MEASUREMENTS OF THE AORTA:  Annulus  2.7 x  2 cm  Root (Sinus of Valsalva)  3.7 cm (cusp to commissure)  Sinotubular junction  3.7 cm  Mid ascending  5.0 by 4.8 cm  Distal ascending  3.9 cm  Mid transverse arch  2.9 cm  Isthmus  2.7 cm  Mid descending  2.4 cm  Distal descending (hiatus)  2.2 cm      CORONARY ARTERIES:  The examination is not optimized for assessment of the coronary  arteries. Normal coronary artery origins.  Right dominant system.      PULMONARY ARTERIES:  The central pulmonary arteries appear  normal (MPA-2.8 cm.      SYSTEMIC AND PULMONARY VEINS:  Normal systemic venous and pulmonary venous return.  The SVC and IVC are of normal caliber.  Normal pulmonary venous  anatomy.      CARDIAC CHAMBERS:  Normal atrioventricular and ventriculoarterial concordance.          INTERVENTRICULAR SEPTUM:  Intact.      AORTIC VALVE:  The aortic valve is  trileaflet in morphology.  No calcifications.      MITRAL VALVE:  No thickening/calcification.      PERICARDIUM:  There is no pericardial effusion or thickening.      CHEST:  Trachea and central airways are patent. No endobronchial lesion.  There is minimal bibasilar atelectasis. Otherwise, there is no focal  pulmonary mass or consolidation. No pleural effusion or pneumothorax.  There are few prominent pulmonary nodules, likely favoring benign  etiology. For reference there is a left Perifissural node measuring  0.4 cm (series 9, image 143/334), there is another right upper lobe  node measuring 0.5 cm (series 9, image 147/334). There are few  prominent mediastinal lymph nodes likely felt to be reactive in  nature. For reference there is right paratracheal node measuring 0.6  cm (series 18, image 388/1111), and other paratracheal node measuring  0.5 cm (series 18 image 349/1111). Visualized thyroid is intact.  Esophagus is normal.      UPPER ABDOMEN:  Limited imaging through the upper abdomen reveals no abnormalities of  the visualized organs.      BONE AND SOFT TISSUE:  No suspicious osseous abnormality. Mild-to-moderate degenerative  changes within the visualized spine.      IMPRESSION:  1.  Stable aneurysmal dilatation of the thoracic ascending aorta  measuring 5.0 cm.. Recommend 1 year aortic surveillance with contrast  chest CT/MR and continued surveillance with cardiothoracic surgery.  2. There is no evidence for acute aortic pathology.  3. Few scattered prominent pulmonary nodules, likely favoring benign  etiology.      Cardiac Cath report reviewed in [facility]    Clinical information reviewed:   Tobacco  Allergies  Meds   Med Hx  Surg Hx   Fam Hx  Soc Hx        NPO Detail:  NPO/Void Status  Date of Last Liquid: 03/06/25  Time of Last  Liquid: 2359  Date of Last Solid: 03/06/25  Time of Last Solid: 2359         Physical Exam    Airway  Mallampati: II  TM distance: >3 FB  Neck ROM: full     Cardiovascular - normal exam     Dental        Pulmonary - normal exam     Abdominal   (+) obese  Abdomen: soft  Bowel sounds: normal           Anesthesia Plan    History of general anesthesia?: yes  History of complications of general anesthesia?: no    ASA 3     general and regional   (Risks, benefits and alternatives to anesthesia plan were discussed with patient who consent. This was including but not limited to Brachial arterial line, GA ETT, RIJ MAC CVC, ROSENDA, regional chest wall nerve blocks, postop mechanical ventilation, postop ICU, possible blood transfusions  )  The patient is not a current smoker.  Education provided regarding risk of obstructive sleep apnea.  intravenous induction   Postoperative administration of opioids is intended.  Anesthetic plan and risks discussed with patient and spouse.  Use of blood products discussed with patient and spouse who consented to blood products.    Plan discussed with attending and CAA.

## 2025-03-07 NOTE — ANESTHESIA PROCEDURE NOTES
Airway  Date/Time: 3/7/2025 7:53 AM  Urgency: elective    Airway not difficult    Staffing  Performed: WILLIAM   Authorized by: Juliana Carlos MD    Performed by: GREGORIA Levine  Patient location during procedure: OR    Indications and Patient Condition  Indications for airway management: anesthesia and airway protection  Spontaneous Ventilation: absent  Sedation level: deep  Preoxygenated: yes  Patient position: sniffing  MILS maintained throughout  Mask difficulty assessment: 2 - vent by mask + OA or adjuvant +/- NMBA  No planned trial extubation    Final Airway Details  Final airway type: endotracheal airway

## 2025-03-07 NOTE — ANESTHESIA PROCEDURE NOTES
Peripheral Block    Patient location during procedure: OR  Start time: 3/7/2025 12:10 PM  End time: 3/7/2025 12:10 PM  Reason for block: at surgeon's request and post-op pain management  Staffing  Performed: attending   Authorized by: Juliana Carlos MD    Performed by: Juliana Carlos MD  Preanesthetic Checklist  Completed: patient identified, IV checked, site marked, risks and benefits discussed, surgical consent, monitors and equipment checked, pre-op evaluation and timeout performed   Timeout performed at: 3/7/2025 12:10 PM  Peripheral Block  Patient position: laying flat  Prep: ChloraPrep and site prepped and draped  Patient monitoring: heart rate, cardiac monitor and continuous pulse ox  Block type: serratus anterior  Laterality: B/L  Injection technique: single-shot  Infiltration strength: 0.5 %  Dose: 50 mL  Needle  Needle type: Pajunk.   Needle gauge: 22 G  Needle length: 8 cm  Needle localization: anatomical landmarks and ultrasound guidance  Test dose: negative  Assessment  Injection assessment: negative aspiration for heme  Paresthesia pain: none  Heart rate change: no  Slow fractionated injection: yes  Additional Notes  Bilateral serratus anterior block with 20 ml 0.5% ropivacaine x 2 with 4 mg dexamethasone given. 10 mL given for right subcostal TAP

## 2025-03-07 NOTE — CARE PLAN
Problem: Safety - Medical Restraint  Goal: Remains free of injury from restraints (Restraint for Interference with Medical Device)  Outcome: Progressing  Goal: Free from restraint(s) (Restraint for Interference with Medical Device)  Outcome: Progressing     Problem: Fall/Injury  Goal: Not fall by end of shift  Outcome: Progressing  Goal: Be free from injury by end of the shift  Outcome: Progressing  Goal: Verbalize understanding of personal risk factors for fall in the hospital  Outcome: Progressing  Goal: Verbalize understanding of risk factor reduction measures to prevent injury from fall in the home  Outcome: Progressing  Goal: Use assistive devices by end of the shift  Outcome: Progressing  Goal: Pace activities to prevent fatigue by end of the shift  Outcome: Progressing     Problem: Pain - Adult  Goal: Verbalizes/displays adequate comfort level or baseline comfort level  Outcome: Progressing     Problem: Safety - Adult  Goal: Free from fall injury  Outcome: Progressing     Problem: Discharge Planning  Goal: Discharge to home or other facility with appropriate resources  Outcome: Progressing     Problem: Chronic Conditions and Co-morbidities  Goal: Patient's chronic conditions and co-morbidity symptoms are monitored and maintained or improved  Outcome: Progressing     Problem: Nutrition  Goal: Nutrient intake appropriate for maintaining nutritional needs  Outcome: Progressing

## 2025-03-07 NOTE — SIGNIFICANT EVENT
Patient placed on SBT earlier with goal to wean to extubate. Parameters are as follows:     03/07/25 1608   Invasive Vent Information   Vent Mode PS   Settings   PEEP/CPAP (cm H2O) 5 cm H20   Pressure Support (cm H2O) 5 cm H20   Readings   Tidal Volume Observed (mL) 568 mL   PIP Observed (cm H2O) 17 cm H2O   Minute Ventilation (L/min) 7.41 L/min   MAP (cm H2O) 10   Resistance (cmH2O/L/sec) 8 cm H2O/L/sec   Daily Screen   Total RSBI 31   Weaning Parameters   Weaning Start Time 1445   Weaning Vital Capacity 1.2 mL   Negative Inspiratory Force (NIF) -24   Spontaneous Minute Volume (MV) 7.41   Weaning Tidal Volume 563 mL   Respiratory Depth/Rhythm Regular   Respiratory Effort Unlabored   Weaning Tolerance Good     Above data was communicated with provider.

## 2025-03-07 NOTE — ANESTHESIA PROCEDURE NOTES
Arterial Line:    Date/Time: 3/7/2025 7:35 AM    Staffing  Performed: GREGORIA   Authorized by: Juliana Carlos MD    Performed by: GREGORIA Levine    An arterial line was placed. Procedure performed using ultrasound guidance.in the OR for the following indication(s): continuous blood pressure monitoring.    A 20 gauge (size), 8 cm (length) (type) catheter was placed into the Left brachial artery, secured by Tegaderm,   Seldinger technique used.  Events:  patient tolerated procedure well with no complications.

## 2025-03-07 NOTE — BRIEF OP NOTE
"Date: 3/7/2025  OR Location: [facility] OR    Name: Arvind Cabrera \"Geremias\", : 1960, Age: 64 y.o., MRN: [id number], Sex: male    Diagnosis  Pre-op Diagnosis      * Aneurysm of ascending aorta without rupture (CMS-HCC) [I71.21] Post-op Diagnosis     * Aneurysm of ascending aorta without rupture (CMS-HCC) [I71.21]     Procedures  ASCENDING AORTA REPAIR WITH 30 MM GELWEAVE GRAFT  99346 - RI AS-AORT GRF W/CARD BYP F/AORTIC DS OTH/THN DSJ  Median Sternotomy  Central Cannulation  Replacement of the ascending aorta with 30 mm Gelweave graft  [place] Sternal Plates x three    Chest Tubes/Drains: 32 fr Anterior Mediastinal                                      28 fr Posterior Mediastinal Jacky       Temporary Pacing Wires: Ventricular    -Settings: Back up @50   -Underlying Rhythm: Sinus    Permanent pacer/ICD: No   -Preoperative settings:    -Intra-op/ Postoperative settings:    Sternotomy performed by: Cristobal Flores MD     Conduit Harvested by: N/A    Sternal Wires placed by: Brett LOPEZ    Arm/Leg/Groin Closure/Cutdown performed by: N/A    Cardio Pulmonary Bypass Time: 51 min  Cross-clamp Time: 44 min  Circulatory Arrest: No Time:     Is patient candidate for Emergency Re-sternotomy? Yes   -If yes, POD #10 is -  3-    Surgeons      * Daysi Tavarez - Primary    Resident/Fellow/Other Assistant:  Surgeons and Role:  * No surgeons found with a matching role *  Mya West CSA   Staff:   Circulator: Sonia Sauceda Person: Kelli  Surgical Assistant: Jamar  Surgical Assistant: Josh    Anesthesia Staff: Anesthesiologist: Juliana Carlos MD  CRNA: Zandra Gupta APRN-CRNA  C-AA: GREGORIA Levine  Perfusionist: Sharon Huntley    Procedure Summary  Anesthesia: Regional, General  ASA: III  Estimated Blood Loss: 250 mL  Intra-op Medications:   Administrations occurring from 0715 to 1650 on 25:   Medication Name Total Dose   sodium chloride 0.9 % " irrigation solution 4,000 mL   vancomycin (Vancocin) vial for injection 9.5 g   calcium chloride 100 mg/mL syringe 1 g   ceFAZolin (Ancef) vial 1 g 2 g   electrolyte-A (Plasmalyte-A) Cannot be calculated   fentaNYL (Sublimaze) injection 50 mcg/mL 500 mcg   heparin injection 1,000 units/mL 40,000 Units   ketamine (Ketalar) 10 mg/mL injection 50 mg   lidocaine (Xylocaine) injection 1 % 100 mg   lidocaine (Xylocaine) injection 2 % 100 mg   magnesium sulfate 50 % injection 2 g   midazolam PF (Versed) injection 1 mg/mL 2 mg   nitroglycerin (Tridil) 50 mg/250 mL D5W (0.2 mg/mL) infusion 350 mcg   nitroglycerin 100 mcg/mL D5W intracoronary syringe - compounded 350 mcg   norepinephrine (Levophed) 16 mcg/mL syringe for Anesthesia 24 mcg   phenylephrine (Peña-Synephrine) 10 mg/250 mL NS (40 mcg/mL) infusion 6.89 mg   phenylephrine (Peña-Synephrine) injection 200 mcg   propofol (Diprivan) IV infusion 1,549.14 mg   protamine injection 425 mg   rocuronium (ZeMuron) 50 mg/5 mL injection 150 mg   sodium bicarbonate injection 1 mEq/mL 25 mEq   tranexamic acid (Cyklokapron) 5,000 mg in sodium chloride 0.9% 250 mL (20 mg/mL) infusion 4,021.97 mg              Anesthesia Record               Intraprocedure I/O Totals          Intake    Ketamine 0.00 mL    The total shown is the total volume documented since Anesthesia Start was filed.    Tranexamic Acid 0.00 mL    The total shown is the total volume documented since Anesthesia Start was filed.    Nitroglycerin Drip 0.00 mL    The total shown is the total volume documented since Anesthesia Start was filed.    Propofol Drip 0.00 mL    The total shown is the total volume documented since Anesthesia Start was filed.    Phenylephrine Drip 0.00 mL    The total shown is the total volume documented since Anesthesia Start was filed.    Cell Saver 656 mL    Total Intake 656 mL       Output    Urine 415 mL    Total Output 415 mL       Net    Net Volume 241 mL          Specimen:   ID Type Source  Tests Collected by Time   1 : AORTA Tissue AORTA SURGICAL PATHOLOGY EXAM Daysi Tavarez MD 3/7/2025 1162                  Findings: Dilated Ascending    Complications:  None; patient tolerated the procedure well.     Disposition: ICU - intubated and hemodynamically stable.  Condition: stable  Specimens Collected:   ID Type Source Tests Collected by Time   1 : AORTA Tissue AORTA SURGICAL PATHOLOGY EXAM Daysi Tavarez MD 3/7/2025 4899     Attending Attestation: I was present and scrubbed for the key portions of the procedure.    Daysi Tavarez  Phone Number: [phone]

## 2025-03-07 NOTE — ANESTHESIA PROCEDURE NOTES
Central Venous Line:    Date/Time: 3/7/2025 7:55 AM    A central venous line was placed in the OR for the following indication(s): central venous access.  Staffing  Performed: CAA   Authorized by: Juliana Carlos MD    Performed by: GREGORIA Levine    Sterility preparation included the following: provider hand hygiene performed prior to central venous catheter insertion, all 5 sterile barriers used (gloves, gown, cap, mask, large sterile drape) during central venous catheter insertion, antiseptic used during central venous catheter insertion and skin prep agent completely dried prior to procedure.  The patient was placed in Trendelenburg position.    Right internal jugular vein was prepped.    The site was prepped with Chlorhexidine.  Size: 9 Fr   Length: 11.5  Number of Lumens: double lumen      During the procedure, the following specific steps were taken: target vein identified, needle advanced into vein and blood aspirated and guidewire advanced into vein.  Seldinger technique used.  Procedure performed using ultrasound guidance.  Sterile gel and probe cover used in ultrasound-guided central venous catheter insertion.    Intravenous verification was obtained by ultrasound, venous blood return and manometry.      Post insertion care included: all ports aspirated, all ports flushed easily, guidewire removed intact, Biopatch applied, line sutured in place and dressing applied.    During the procedure the patient experienced: patient tolerated procedure well with no complications.           images stored in chart

## 2025-03-07 NOTE — ANESTHESIA POSTPROCEDURE EVALUATION
"Patient: Arvind Cabrera \"Geremias\"    Procedure Summary       Date: 03/07/25 Room / Location: [facility] OR 21 / [facility] OR    Anesthesia Start: 0719 Anesthesia Stop: 1243    Procedure: ASCENDING AORTA REPAIR WITH 30 MM GELWEAVE GRAFT (Chest) Diagnosis:       Aneurysm of ascending aorta without rupture (CMS-HCC)      (Aneurysm of ascending aorta without rupture (CMS-HCC) [I71.21])    Surgeons: Daysi Tavarez MD Responsible Provider: Juliana Carlos MD    Anesthesia Type: general, regional ASA Status: 3            Anesthesia Type: general, regional    Vitals Value Taken Time   /66 03/07/25 1230   Temp 35.5 °C (95.9 °F) 03/07/25 1332   Pulse 69 03/07/25 1332   Resp 16 03/07/25 1332   SpO2 100 % 03/07/25 1332   Vitals shown include unfiled device data.    Anesthesia Post Evaluation    Patient location during evaluation: ICU  Patient participation: complete - patient cannot participate  Level of consciousness: sedated  Pain management: adequate  Multimodal analgesia pain management approach  Airway patency: patent  Two or more strategies used to mitigate risk of obstructive sleep apnea  Cardiovascular status: acceptable  Respiratory status: acceptable, ETT and ventilator  Hydration status: acceptable  Postoperative Nausea and Vomiting: none    No notable events documented.    "

## 2025-03-08 ENCOUNTER — APPOINTMENT (OUTPATIENT)
Dept: RADIOLOGY | Facility: HOSPITAL | Age: 65
DRG: 220 | End: 2025-03-08
Payer: COMMERCIAL

## 2025-03-08 PROBLEM — G89.18 ACUTE POSTOPERATIVE PAIN: Status: ACTIVE | Noted: 2025-03-08

## 2025-03-08 LAB
ALBUMIN SERPL BCP-MCNC: 3.6 G/DL (ref 3.4–5)
ANION GAP SERPL CALC-SCNC: 13 MMOL/L (ref 10–20)
BUN SERPL-MCNC: 19 MG/DL (ref 6–23)
CA-I BLD-SCNC: 1.11 MMOL/L (ref 1.1–1.33)
CALCIUM SERPL-MCNC: 8.1 MG/DL (ref 8.6–10.6)
CHLORIDE SERPL-SCNC: 105 MMOL/L (ref 98–107)
CO2 SERPL-SCNC: 24 MMOL/L (ref 21–32)
CREAT SERPL-MCNC: 0.74 MG/DL (ref 0.5–1.3)
EGFRCR SERPLBLD CKD-EPI 2021: >90 ML/MIN/1.73M*2
ERYTHROCYTE [DISTWIDTH] IN BLOOD BY AUTOMATED COUNT: 12.6 % (ref 11.5–14.5)
GLUCOSE BLD MANUAL STRIP-MCNC: 109 MG/DL (ref 74–99)
GLUCOSE BLD MANUAL STRIP-MCNC: 120 MG/DL (ref 74–99)
GLUCOSE BLD MANUAL STRIP-MCNC: 126 MG/DL (ref 74–99)
GLUCOSE BLD MANUAL STRIP-MCNC: 135 MG/DL (ref 74–99)
GLUCOSE BLD MANUAL STRIP-MCNC: 140 MG/DL (ref 74–99)
GLUCOSE BLD MANUAL STRIP-MCNC: 155 MG/DL (ref 74–99)
GLUCOSE BLD MANUAL STRIP-MCNC: 185 MG/DL (ref 74–99)
GLUCOSE SERPL-MCNC: 148 MG/DL (ref 74–99)
HCT VFR BLD AUTO: 38.4 % (ref 41–52)
HGB BLD-MCNC: 13.5 G/DL (ref 13.5–17.5)
MAGNESIUM SERPL-MCNC: 2.35 MG/DL (ref 1.6–2.4)
MCH RBC QN AUTO: 30.2 PG (ref 26–34)
MCHC RBC AUTO-ENTMCNC: 35.2 G/DL (ref 32–36)
MCV RBC AUTO: 86 FL (ref 80–100)
NRBC BLD-RTO: 0 /100 WBCS (ref 0–0)
PHOSPHATE SERPL-MCNC: 2.8 MG/DL (ref 2.5–4.9)
PLATELET # BLD AUTO: 224 X10*3/UL (ref 150–450)
POTASSIUM SERPL-SCNC: 3.7 MMOL/L (ref 3.5–5.3)
RBC # BLD AUTO: 4.47 X10*6/UL (ref 4.5–5.9)
SODIUM SERPL-SCNC: 138 MMOL/L (ref 136–145)
WBC # BLD AUTO: 11.6 X10*3/UL (ref 4.4–11.3)

## 2025-03-08 PROCEDURE — 97116 GAIT TRAINING THERAPY: CPT | Mod: GP

## 2025-03-08 PROCEDURE — 2500000005 HC RX 250 GENERAL PHARMACY W/O HCPCS

## 2025-03-08 PROCEDURE — 71045 X-RAY EXAM CHEST 1 VIEW: CPT

## 2025-03-08 PROCEDURE — 37799 UNLISTED PX VASCULAR SURGERY: CPT

## 2025-03-08 PROCEDURE — 2500000004 HC RX 250 GENERAL PHARMACY W/ HCPCS (ALT 636 FOR OP/ED)

## 2025-03-08 PROCEDURE — 82947 ASSAY GLUCOSE BLOOD QUANT: CPT

## 2025-03-08 PROCEDURE — 83735 ASSAY OF MAGNESIUM: CPT

## 2025-03-08 PROCEDURE — 2500000001 HC RX 250 WO HCPCS SELF ADMINISTERED DRUGS (ALT 637 FOR MEDICARE OP)

## 2025-03-08 PROCEDURE — 2500000001 HC RX 250 WO HCPCS SELF ADMINISTERED DRUGS (ALT 637 FOR MEDICARE OP): Performed by: NURSE PRACTITIONER

## 2025-03-08 PROCEDURE — 2500000005 HC RX 250 GENERAL PHARMACY W/O HCPCS: Performed by: NURSE PRACTITIONER

## 2025-03-08 PROCEDURE — 1200000002 HC GENERAL ROOM WITH TELEMETRY DAILY

## 2025-03-08 PROCEDURE — 99291 CRITICAL CARE FIRST HOUR: CPT | Performed by: STUDENT IN AN ORGANIZED HEALTH CARE EDUCATION/TRAINING PROGRAM

## 2025-03-08 PROCEDURE — 2500000002 HC RX 250 W HCPCS SELF ADMINISTERED DRUGS (ALT 637 FOR MEDICARE OP, ALT 636 FOR OP/ED)

## 2025-03-08 PROCEDURE — 71045 X-RAY EXAM CHEST 1 VIEW: CPT | Performed by: RADIOLOGY

## 2025-03-08 PROCEDURE — P9045 ALBUMIN (HUMAN), 5%, 250 ML: HCPCS | Mod: JZ

## 2025-03-08 PROCEDURE — 97161 PT EVAL LOW COMPLEX 20 MIN: CPT | Mod: GP

## 2025-03-08 PROCEDURE — 80069 RENAL FUNCTION PANEL: CPT

## 2025-03-08 PROCEDURE — 2500000004 HC RX 250 GENERAL PHARMACY W/ HCPCS (ALT 636 FOR OP/ED): Performed by: NURSE PRACTITIONER

## 2025-03-08 PROCEDURE — 2500000004 HC RX 250 GENERAL PHARMACY W/ HCPCS (ALT 636 FOR OP/ED): Mod: JZ

## 2025-03-08 PROCEDURE — 82330 ASSAY OF CALCIUM: CPT

## 2025-03-08 PROCEDURE — 85027 COMPLETE CBC AUTOMATED: CPT

## 2025-03-08 PROCEDURE — 99231 SBSQ HOSP IP/OBS SF/LOW 25: CPT | Performed by: ANESTHESIOLOGY

## 2025-03-08 RX ORDER — INSULIN LISPRO 100 [IU]/ML
0-10 INJECTION, SOLUTION INTRAVENOUS; SUBCUTANEOUS
Status: ACTIVE | OUTPATIENT
Start: 2025-03-08

## 2025-03-08 RX ORDER — IRON POLYSACCHARIDE COMPLEX 150 MG
150 CAPSULE ORAL DAILY
Status: DISPENSED | OUTPATIENT
Start: 2025-03-09

## 2025-03-08 RX ORDER — LIDOCAINE 560 MG/1
1 PATCH PERCUTANEOUS; TOPICAL; TRANSDERMAL DAILY
Status: DISPENSED | OUTPATIENT
Start: 2025-03-08

## 2025-03-08 RX ORDER — FUROSEMIDE 10 MG/ML
40 INJECTION INTRAMUSCULAR; INTRAVENOUS ONCE
Status: COMPLETED | OUTPATIENT
Start: 2025-03-08 | End: 2025-03-08

## 2025-03-08 RX ORDER — ALBUMIN HUMAN 50 G/1000ML
25 SOLUTION INTRAVENOUS ONCE
Status: COMPLETED | OUTPATIENT
Start: 2025-03-08 | End: 2025-03-08

## 2025-03-08 RX ORDER — BISACODYL 10 MG/1
10 SUPPOSITORY RECTAL DAILY PRN
Status: ACTIVE | OUTPATIENT
Start: 2025-03-08

## 2025-03-08 RX ORDER — ASPIRIN 81 MG/1
81 TABLET ORAL DAILY
Status: DISPENSED | OUTPATIENT
Start: 2025-03-09

## 2025-03-08 RX ORDER — MULTIVIT-MIN/IRON FUM/FOLIC AC 7.5 MG-4
1 TABLET ORAL DAILY
Status: DISPENSED | OUTPATIENT
Start: 2025-03-09

## 2025-03-08 RX ADMIN — Medication 2 L/MIN: at 20:00

## 2025-03-08 RX ADMIN — INSULIN LISPRO 10 UNITS: 100 INJECTION, SOLUTION INTRAVENOUS; SUBCUTANEOUS at 08:20

## 2025-03-08 RX ADMIN — POLYETHYLENE GLYCOL 3350 17 G: 17 POWDER, FOR SOLUTION ORAL at 20:41

## 2025-03-08 RX ADMIN — ACETAMINOPHEN 650 MG: 325 TABLET ORAL at 02:10

## 2025-03-08 RX ADMIN — OXYCODONE HYDROCHLORIDE 10 MG: 10 TABLET ORAL at 13:47

## 2025-03-08 RX ADMIN — POTASSIUM CHLORIDE 40 MEQ: 1.5 POWDER, FOR SOLUTION ORAL at 02:09

## 2025-03-08 RX ADMIN — FUROSEMIDE 40 MG: 10 INJECTION, SOLUTION INTRAMUSCULAR; INTRAVENOUS at 10:30

## 2025-03-08 RX ADMIN — ACETAMINOPHEN 650 MG: 325 TABLET ORAL at 13:47

## 2025-03-08 RX ADMIN — ACETAMINOPHEN 650 MG: 325 TABLET ORAL at 08:16

## 2025-03-08 RX ADMIN — INSULIN LISPRO 5 UNITS: 100 INJECTION, SOLUTION INTRAVENOUS; SUBCUTANEOUS at 00:13

## 2025-03-08 RX ADMIN — CEFAZOLIN SODIUM 2 G: 2 INJECTION, SOLUTION INTRAVENOUS at 08:16

## 2025-03-08 RX ADMIN — SENNOSIDES AND DOCUSATE SODIUM 2 TABLET: 50; 8.6 TABLET ORAL at 20:40

## 2025-03-08 RX ADMIN — OXYCODONE HYDROCHLORIDE 5 MG: 5 TABLET ORAL at 20:40

## 2025-03-08 RX ADMIN — POLYETHYLENE GLYCOL 3350 17 G: 17 POWDER, FOR SOLUTION ORAL at 08:19

## 2025-03-08 RX ADMIN — ACETAMINOPHEN 650 MG: 325 TABLET ORAL at 20:41

## 2025-03-08 RX ADMIN — ASPIRIN 81 MG: 81 TABLET, CHEWABLE ORAL at 08:16

## 2025-03-08 RX ADMIN — LIDOCAINE 1 PATCH: 4 PATCH TOPICAL at 08:42

## 2025-03-08 RX ADMIN — OXYCODONE HYDROCHLORIDE 10 MG: 10 TABLET ORAL at 03:33

## 2025-03-08 RX ADMIN — ALBUMIN HUMAN 25 G: 0.05 INJECTION, SOLUTION INTRAVENOUS at 06:46

## 2025-03-08 RX ADMIN — SENNOSIDES AND DOCUSATE SODIUM 2 TABLET: 50; 8.6 TABLET ORAL at 08:16

## 2025-03-08 RX ADMIN — ATORVASTATIN CALCIUM 40 MG: 40 TABLET, FILM COATED ORAL at 20:40

## 2025-03-08 RX ADMIN — CEFAZOLIN SODIUM 2 G: 2 INJECTION, SOLUTION INTRAVENOUS at 15:28

## 2025-03-08 ASSESSMENT — PAIN SCALES - GENERAL
PAINLEVEL_OUTOF10: 2
PAINLEVEL_OUTOF10: 4
PAINLEVEL_OUTOF10: 8
PAINLEVEL_OUTOF10: 3
PAINLEVEL_OUTOF10: 8
PAINLEVEL_OUTOF10: 0 - NO PAIN

## 2025-03-08 ASSESSMENT — ACTIVITIES OF DAILY LIVING (ADL): ADL_ASSISTANCE: INDEPENDENT

## 2025-03-08 ASSESSMENT — PAIN - FUNCTIONAL ASSESSMENT
PAIN_FUNCTIONAL_ASSESSMENT: 0-10

## 2025-03-08 ASSESSMENT — COGNITIVE AND FUNCTIONAL STATUS - GENERAL
STANDING UP FROM CHAIR USING ARMS: A LITTLE
STANDING UP FROM CHAIR USING ARMS: A LITTLE
MOVING FROM LYING ON BACK TO SITTING ON SIDE OF FLAT BED WITH BEDRAILS: A LITTLE
WALKING IN HOSPITAL ROOM: A LITTLE
CLIMB 3 TO 5 STEPS WITH RAILING: A LOT
MOVING TO AND FROM BED TO CHAIR: A LITTLE
MOBILITY SCORE: 17
MOVING TO AND FROM BED TO CHAIR: A LITTLE
TURNING FROM BACK TO SIDE WHILE IN FLAT BAD: A LITTLE
CLIMB 3 TO 5 STEPS WITH RAILING: A LITTLE
DRESSING REGULAR UPPER BODY CLOTHING: A LITTLE
TOILETING: A LITTLE
TURNING FROM BACK TO SIDE WHILE IN FLAT BAD: A LITTLE
DRESSING REGULAR LOWER BODY CLOTHING: A LITTLE
MOBILITY SCORE: 19
DAILY ACTIVITIY SCORE: 20
HELP NEEDED FOR BATHING: A LITTLE
WALKING IN HOSPITAL ROOM: A LITTLE

## 2025-03-08 ASSESSMENT — PAIN DESCRIPTION - LOCATION: LOCATION: CHEST

## 2025-03-08 NOTE — CONSULTS
Arvind Cabrera is a 64 y.o. year old male patient who presents for Procedure(s):  ASCENDING AORTA REPAIR WITH 30 MM GELWEAVE GRAFT with Daysi Tavarez MD on 3/7/2025.  Acute Pain consulted for assistance with pain control.     Anticipated Postop Pain Issues -   Palliative: typically relieved with IV analgesics and regional local anesthetics  Provocative: typically with movement  Quality: typically burning and aching  Radiation: typically none  Severity: typically severe 8-10/10  Timing: typically constant    Past Medical History:   Diagnosis Date    Adverse effect of anesthesia     Reports significant hypotension following OR during prostate surgery, states during PACU and was told it was during extubation of anesthesia.    Anesthesia of skin     Numbness and tingling    Arthritis     hips    Carpal tunnel syndrome 11/19/2024    Chronic bronchitis (Multi)     High prostate specific antigen (PSA) 02/17/2023    History of prostate cancer 03/17/2023    Hypertension     Numbness and tingling sensation of skin 11/19/2024    Pain in left shoulder 10/13/2020    Shoulder pain, left    Peripheral neuropathy     Personal history of other specified conditions 11/22/2021    History of elevated prostate specific antigen (PSA)    Postoperative complication 11/19/2024    Prostate cancer (Multi)     2021 s/p prostatectomy and Radiation therapy    Spondylosis without myelopathy or radiculopathy, lumbar region 08/26/2022    Thoracic ascending aortic aneurysm (CMS-HCC)     CT Chest scan on 2/7/25-measuring 5.0 cm        Past Surgical History:   Procedure Laterality Date    CARDIAC CATHETERIZATION N/A 01/24/2025    Procedure: Left & Right Heart Cath w Angiography & LV;  Surgeon: Santi Monroe MD;  Location: [place] [address] Cardiac Cath Lab;  Service: Cardiovascular;  Laterality: N/A;  request 1/24 or 1/31    OTHER SURGICAL HISTORY  03/23/2020    Carpal tunnel surgery    OTHER SURGICAL HISTORY  12/22/2021     Robotic-assisted laparoscopic radical prostatectomy        Family History   Problem Relation Name Age of Onset    Deep vein thrombosis Mother      Other (heart valve replacement) Father      Other (varicose veins) Sister      Heart attack Paternal Grandfather          Social History     Socioeconomic History    Marital status:      Spouse name: Not on file    Number of children: Not on file    Years of education: Not on file    Highest education level: Not on file   Occupational History    Not on file   Tobacco Use    Smoking status: Never     Passive exposure: Never    Smokeless tobacco: Never   Substance and Sexual Activity    Alcohol use: Yes     Comment: once every other week    Drug use: Never    Sexual activity: Defer   Other Topics Concern    Not on file   Social History Narrative    Not on file     Social Drivers of Health     Financial Resource Strain: Not on file   Food Insecurity: Not on file   Transportation Needs: Not on file   Physical Activity: Not on file   Stress: Not on file   Social Connections: Not on file   Intimate Partner Violence: Not on file   Housing Stability: Not on file        No Known Allergies      Review of Systems  Unable to obtain 12 point review of systems as patient is intubated and sedated    Physical Exam:  Constitutional:  sedated  Eyes: clear sclera  Head/Neck: No apparent injury, trachea midline  Respiratory/Thorax: intuabted  Cardiovascular: no pitting edema  Gastrointestinal: Nondistended  Musculoskeletal: sedated  Extremities: no clubbing  Neurological: intubated and sedated  Psychological: intubated and sedated    Results for orders placed or performed during the hospital encounter of 03/07/25 (from the past 24 hours)   Blood Gas Arterial Full Panel Unsolicited   Result Value Ref Range    POCT pH, Arterial 7.35 (L) 7.38 - 7.42 pH    POCT pCO2, Arterial 44 (H) 38 - 42 mm Hg    POCT pO2, Arterial 172 (H) 85 - 95 mm Hg    POCT SO2, Arterial 100 94 - 100 %    POCT Oxy  Hemoglobin, Arterial 98.0 94.0 - 98.0 %    POCT Hematocrit Calculated, Arterial 38.0 (L) 41.0 - 52.0 %    POCT Sodium, Arterial 135 (L) 136 - 145 mmol/L    POCT Potassium, Arterial 3.7 3.5 - 5.3 mmol/L    POCT Chloride, Arterial 104 98 - 107 mmol/L    POCT Ionized Calcium, Arterial 1.07 (L) 1.10 - 1.33 mmol/L    POCT Glucose, Arterial 143 (H) 74 - 99 mg/dL    POCT Lactate, Arterial 0.9 0.4 - 2.0 mmol/L    POCT Base Excess, Arterial -1.5 -2.0 - 3.0 mmol/L    POCT HCO3 Calculated, Arterial 24.3 22.0 - 26.0 mmol/L    POCT Hemoglobin, Arterial 12.7 (L) 13.5 - 17.5 g/dL    POCT Anion Gap, Arterial 10 10 - 25 mmo/L    Patient Temperature 37.0 degrees Celsius    FiO2 60 %   Coox Panel, Arterial Unsolicited   Result Value Ref Range    POCT Hemoglobin, Arterial 12.7 (L) 13.5 - 17.5 g/dL    POCT Oxy Hemoglobin, Arterial 98.0 94.0 - 98.0 %    POCT Carboxyhemoglobin, Arterial 1.2 %    POCT Methemoglobin, Arterial 0.8 0.0 - 1.5 %    POCT Deoxy Hemoglobin, Arterial 0.0 0.0 - 5.0 %   Blood Gas Arterial Full Panel Unsolicited   Result Value Ref Range    POCT pH, Arterial 7.40 7.38 - 7.42 pH    POCT pCO2, Arterial 43 (H) 38 - 42 mm Hg    POCT pO2, Arterial 536 (H) 85 - 95 mm Hg    POCT SO2, Arterial 100 94 - 100 %    POCT Oxy Hemoglobin, Arterial 98.1 (H) 94.0 - 98.0 %    POCT Hematocrit Calculated, Arterial 35.0 (L) 41.0 - 52.0 %    POCT Sodium, Arterial 134 (L) 136 - 145 mmol/L    POCT Potassium, Arterial 3.7 3.5 - 5.3 mmol/L    POCT Chloride, Arterial 103 98 - 107 mmol/L    POCT Ionized Calcium, Arterial 0.97 (L) 1.10 - 1.33 mmol/L    POCT Glucose, Arterial 147 (H) 74 - 99 mg/dL    POCT Lactate, Arterial 0.9 0.4 - 2.0 mmol/L    POCT Base Excess, Arterial 1.5 -2.0 - 3.0 mmol/L    POCT HCO3 Calculated, Arterial 26.6 (H) 22.0 - 26.0 mmol/L    POCT Hemoglobin, Arterial 11.5 (L) 13.5 - 17.5 g/dL    POCT Anion Gap, Arterial 8 (L) 10 - 25 mmo/L    Patient Temperature 37.0 degrees Celsius    FiO2 100 %   Coox Panel, Arterial Unsolicited    Result Value Ref Range    POCT Hemoglobin, Arterial 11.5 (L) 13.5 - 17.5 g/dL    POCT Oxy Hemoglobin, Arterial 98.1 (H) 94.0 - 98.0 %    POCT Carboxyhemoglobin, Arterial 0.9 %    POCT Methemoglobin, Arterial 1.0 0.0 - 1.5 %    POCT Deoxy Hemoglobin, Arterial 0.0 0.0 - 5.0 %   Blood Gas Venous Full Panel Unsolicited   Result Value Ref Range    POCT pH, Venous 7.37 7.33 - 7.43 pH    POCT pCO2, Venous 46 41 - 51 mm Hg    POCT pO2, Venous 49 (H) 35 - 45 mm Hg    POCT SO2, Venous 89 (H) 45 - 75 %    POCT Oxy Hemoglobin, Venous 86.5 (H) 45.0 - 75.0 %    POCT Hematocrit Calculated, Venous 31.0 (L) 41.0 - 52.0 %    POCT Sodium, Venous 134 (L) 136 - 145 mmol/L    POCT Potassium, Venous 4.5 3.5 - 5.3 mmol/L    POCT Chloride, Venous 102 98 - 107 mmol/L    POCT Ionized Calicum, Venous 0.96 (L) 1.10 - 1.33 mmol/L    POCT Glucose, Venous 148 (H) 74 - 99 mg/dL    POCT Lactate, Venous 0.9 0.4 - 2.0 mmol/L    POCT Base Excess, Venous 1.0 -2.0 - 3.0 mmol/L    POCT HCO3 Calculated, Venous 26.6 (H) 22.0 - 26.0 mmol/L    POCT Hemoglobin, Venous 10.2 (L) 13.5 - 17.5 g/dL    POCT Anion Gap, Venous 10.0 10.0 - 25.0 mmol/L    Patient Temperature 37.0 degrees Celsius    FiO2 75 %   Coox Panel, Venous Unsolicited   Result Value Ref Range    POCT Carboxyhemoglobin, Venous 1.5 %    POCT Methemoglobin, Venous 1.1 0.0 - 1.5 %   Blood Gas Arterial Full Panel Unsolicited   Result Value Ref Range    POCT pH, Arterial 7.39 7.38 - 7.42 pH    POCT pCO2, Arterial 41 38 - 42 mm Hg    POCT pO2, Arterial 400 (H) 85 - 95 mm Hg    POCT SO2, Arterial 100 94 - 100 %    POCT Oxy Hemoglobin, Arterial 98.0 94.0 - 98.0 %    POCT Hematocrit Calculated, Arterial 32.0 (L) 41.0 - 52.0 %    POCT Sodium, Arterial 134 (L) 136 - 145 mmol/L    POCT Potassium, Arterial 4.3 3.5 - 5.3 mmol/L    POCT Chloride, Arterial 103 98 - 107 mmol/L    POCT Ionized Calcium, Arterial 1.00 (L) 1.10 - 1.33 mmol/L    POCT Glucose, Arterial 135 (H) 74 - 99 mg/dL    POCT Lactate, Arterial  1.2 0.4 - 2.0 mmol/L    POCT Base Excess, Arterial -0.2 -2.0 - 3.0 mmol/L    POCT HCO3 Calculated, Arterial 24.8 22.0 - 26.0 mmol/L    POCT Hemoglobin, Arterial 10.8 (L) 13.5 - 17.5 g/dL    POCT Anion Gap, Arterial 11 10 - 25 mmo/L    Patient Temperature 37.0 degrees Celsius    FiO2 75 %   Coox Panel, Arterial Unsolicited   Result Value Ref Range    POCT Hemoglobin, Arterial 10.8 (L) 13.5 - 17.5 g/dL    POCT Oxy Hemoglobin, Arterial 98.0 94.0 - 98.0 %    POCT Carboxyhemoglobin, Arterial 0.6 %    POCT Methemoglobin, Arterial 1.1 0.0 - 1.5 %    POCT Deoxy Hemoglobin, Arterial 0.3 0.0 - 5.0 %   Blood Gas Arterial Full Panel Unsolicited   Result Value Ref Range    POCT pH, Arterial 7.39 7.38 - 7.42 pH    POCT pCO2, Arterial 41 38 - 42 mm Hg    POCT pO2, Arterial 419 (H) 85 - 95 mm Hg    POCT SO2, Arterial 100 94 - 100 %    POCT Oxy Hemoglobin, Arterial 98.0 94.0 - 98.0 %    POCT Hematocrit Calculated, Arterial 33.0 (L) 41.0 - 52.0 %    POCT Sodium, Arterial 137 136 - 145 mmol/L    POCT Potassium, Arterial 3.7 3.5 - 5.3 mmol/L    POCT Chloride, Arterial 105 98 - 107 mmol/L    POCT Ionized Calcium, Arterial 1.09 (L) 1.10 - 1.33 mmol/L    POCT Glucose, Arterial 123 (H) 74 - 99 mg/dL    POCT Lactate, Arterial 1.6 0.4 - 2.0 mmol/L    POCT Base Excess, Arterial -0.2 -2.0 - 3.0 mmol/L    POCT HCO3 Calculated, Arterial 24.8 22.0 - 26.0 mmol/L    POCT Hemoglobin, Arterial 11.1 (L) 13.5 - 17.5 g/dL    POCT Anion Gap, Arterial 11 10 - 25 mmo/L    Patient Temperature 37.0 degrees Celsius    FiO2 100 %   Coox Panel, Arterial Unsolicited   Result Value Ref Range    POCT Hemoglobin, Arterial 11.1 (L) 13.5 - 17.5 g/dL    POCT Oxy Hemoglobin, Arterial 98.0 94.0 - 98.0 %    POCT Carboxyhemoglobin, Arterial 0.8 %    POCT Methemoglobin, Arterial 1.1 0.0 - 1.5 %    POCT Deoxy Hemoglobin, Arterial 0.0 0.0 - 5.0 %   Magnesium   Result Value Ref Range    Magnesium 2.91 (H) 1.60 - 2.40 mg/dL   Coagulation Screen   Result Value Ref Range     Protime 12.3 9.8 - 12.4 seconds    INR 1.1 0.9 - 1.1    aPTT 29 26 - 36 seconds   Fibrinogen   Result Value Ref Range    Fibrinogen 219 200 - 400 mg/dL   CBC   Result Value Ref Range    WBC 14.3 (H) 4.4 - 11.3 x10*3/uL    nRBC 0.0 0.0 - 0.0 /100 WBCs    RBC 4.47 (L) 4.50 - 5.90 x10*6/uL    Hemoglobin 13.8 13.5 - 17.5 g/dL    Hematocrit 38.4 (L) 41.0 - 52.0 %    MCV 86 80 - 100 fL    MCH 30.9 26.0 - 34.0 pg    MCHC 35.9 32.0 - 36.0 g/dL    RDW 12.4 11.5 - 14.5 %    Platelets 191 150 - 450 x10*3/uL   Renal Function Panel   Result Value Ref Range    Glucose 152 (H) 74 - 99 mg/dL    Sodium 139 136 - 145 mmol/L    Potassium 4.1 3.5 - 5.3 mmol/L    Chloride 105 98 - 107 mmol/L    Bicarbonate 24 21 - 32 mmol/L    Anion Gap 14 10 - 20 mmol/L    Urea Nitrogen 20 6 - 23 mg/dL    Creatinine 0.80 0.50 - 1.30 mg/dL    eGFR >90 >60 mL/min/1.73m*2    Calcium 8.1 (L) 8.6 - 10.6 mg/dL    Phosphorus 2.6 2.5 - 4.9 mg/dL    Albumin 3.7 3.4 - 5.0 g/dL   Blood Gas Arterial Full Panel   Result Value Ref Range    POCT pH, Arterial 7.46 (H) 7.38 - 7.42 pH    POCT pCO2, Arterial 35 (L) 38 - 42 mm Hg    POCT pO2, Arterial 123 (H) 85 - 95 mm Hg    POCT SO2, Arterial 99 94 - 100 %    POCT Oxy Hemoglobin, Arterial 96.6 94.0 - 98.0 %    POCT Hematocrit Calculated, Arterial 42.0 41.0 - 52.0 %    POCT Sodium, Arterial 135 (L) 136 - 145 mmol/L    POCT Potassium, Arterial 4.2 3.5 - 5.3 mmol/L    POCT Chloride, Arterial 106 98 - 107 mmol/L    POCT Ionized Calcium, Arterial 1.09 (L) 1.10 - 1.33 mmol/L    POCT Glucose, Arterial 163 (H) 74 - 99 mg/dL    POCT Lactate, Arterial 1.4 0.4 - 2.0 mmol/L    POCT Base Excess, Arterial 1.4 -2.0 - 3.0 mmol/L    POCT HCO3 Calculated, Arterial 24.9 22.0 - 26.0 mmol/L    POCT Hemoglobin, Arterial 14.1 13.5 - 17.5 g/dL    POCT Anion Gap, Arterial 8 (L) 10 - 25 mmo/L    Patient Temperature 37.0 degrees Celsius    FiO2 50 %   POCT GLUCOSE   Result Value Ref Range    POCT Glucose 115 (H) 74 - 99 mg/dL   POCT GLUCOSE    Result Value Ref Range    POCT Glucose 135 (H) 74 - 99 mg/dL   Blood Gas Arterial Full Panel   Result Value Ref Range    POCT pH, Arterial 7.40 7.38 - 7.42 pH    POCT pCO2, Arterial 41 38 - 42 mm Hg    POCT pO2, Arterial 138 (H) 85 - 95 mm Hg    POCT SO2, Arterial 100 94 - 100 %    POCT Oxy Hemoglobin, Arterial 97.8 94.0 - 98.0 %    POCT Hematocrit Calculated, Arterial 44.0 41.0 - 52.0 %    POCT Sodium, Arterial 136 136 - 145 mmol/L    POCT Potassium, Arterial 4.0 3.5 - 5.3 mmol/L    POCT Chloride, Arterial 106 98 - 107 mmol/L    POCT Ionized Calcium, Arterial 1.10 1.10 - 1.33 mmol/L    POCT Glucose, Arterial 159 (H) 74 - 99 mg/dL    POCT Lactate, Arterial 1.1 0.4 - 2.0 mmol/L    POCT Base Excess, Arterial 0.5 -2.0 - 3.0 mmol/L    POCT HCO3 Calculated, Arterial 25.4 22.0 - 26.0 mmol/L    POCT Hemoglobin, Arterial 14.5 13.5 - 17.5 g/dL    POCT Anion Gap, Arterial 9 (L) 10 - 25 mmo/L    Patient Temperature 37.0 degrees Celsius    FiO2 50 %   Blood Gas Arterial Full Panel   Result Value Ref Range    POCT pH, Arterial 7.43 (H) 7.38 - 7.42 pH    POCT pCO2, Arterial 38 38 - 42 mm Hg    POCT pO2, Arterial 90 85 - 95 mm Hg    POCT SO2, Arterial 99 94 - 100 %    POCT Oxy Hemoglobin, Arterial 96.0 94.0 - 98.0 %    POCT Hematocrit Calculated, Arterial 44.0 41.0 - 52.0 %    POCT Sodium, Arterial 135 (L) 136 - 145 mmol/L    POCT Potassium, Arterial 3.8 3.5 - 5.3 mmol/L    POCT Chloride, Arterial 106 98 - 107 mmol/L    POCT Ionized Calcium, Arterial 1.10 1.10 - 1.33 mmol/L    POCT Glucose, Arterial 173 (H) 74 - 99 mg/dL    POCT Lactate, Arterial 1.2 0.4 - 2.0 mmol/L    POCT Base Excess, Arterial 1.0 -2.0 - 3.0 mmol/L    POCT HCO3 Calculated, Arterial 25.2 22.0 - 26.0 mmol/L    POCT Hemoglobin, Arterial 14.6 13.5 - 17.5 g/dL    POCT Anion Gap, Arterial 8 (L) 10 - 25 mmo/L    Patient Temperature 37.0 degrees Celsius    FiO2 36 %   POCT GLUCOSE   Result Value Ref Range    POCT Glucose 148 (H) 74 - 99 mg/dL   POCT GLUCOSE   Result  Value Ref Range    POCT Glucose 155 (H) 74 - 99 mg/dL   Calcium, Ionized   Result Value Ref Range    POCT Calcium, Ionized 1.11 1.1 - 1.33 mmol/L   Magnesium   Result Value Ref Range    Magnesium 2.35 1.60 - 2.40 mg/dL   CBC   Result Value Ref Range    WBC 11.6 (H) 4.4 - 11.3 x10*3/uL    nRBC 0.0 0.0 - 0.0 /100 WBCs    RBC 4.47 (L) 4.50 - 5.90 x10*6/uL    Hemoglobin 13.5 13.5 - 17.5 g/dL    Hematocrit 38.4 (L) 41.0 - 52.0 %    MCV 86 80 - 100 fL    MCH 30.2 26.0 - 34.0 pg    MCHC 35.2 32.0 - 36.0 g/dL    RDW 12.6 11.5 - 14.5 %    Platelets 224 150 - 450 x10*3/uL   Renal Function Panel   Result Value Ref Range    Glucose 148 (H) 74 - 99 mg/dL    Sodium 138 136 - 145 mmol/L    Potassium 3.7 3.5 - 5.3 mmol/L    Chloride 105 98 - 107 mmol/L    Bicarbonate 24 21 - 32 mmol/L    Anion Gap 13 10 - 20 mmol/L    Urea Nitrogen 19 6 - 23 mg/dL    Creatinine 0.74 0.50 - 1.30 mg/dL    eGFR >90 >60 mL/min/1.73m*2    Calcium 8.1 (L) 8.6 - 10.6 mg/dL    Phosphorus 2.8 2.5 - 4.9 mg/dL    Albumin 3.6 3.4 - 5.0 g/dL   POCT GLUCOSE   Result Value Ref Range    POCT Glucose 140 (H) 74 - 99 mg/dL   POCT GLUCOSE   Result Value Ref Range    POCT Glucose 185 (H) 74 - 99 mg/dL        Arvind Cabrera is a 64 y.o. year old male patient who presents for Procedure(s):  ASCENDING AORTA REPAIR WITH 30 MM GELWEAVE GRAFT with Daysi Tavarez MD on 3/7/2025. Acute Pain consulted for assistance with pain control.     Plan:    - Bilateral single shot serratus anterior blocks performed intra-operatively 3/7/2025  - Pain medications per primary team  - Will see on POD1 if inpatient    Acute Pain Resident  pg [phone] ph [phone]

## 2025-03-08 NOTE — PROGRESS NOTES
CTICU Progress Note  Arvind Cabrera/[id number]    Admit Date: 3/7/2025  Hospital Length of Stay: 1   ICU Length of Stay: 23h   CT SURGEON:     SUBJECTIVE:   JOSSELINE. Pt sitting on chair this morning, pain 4/10.    MEDICATIONS  Infusions:  lactated Ringer's, Last Rate: 30 mL/hr (03/08/25 1200)  lactated Ringer's, Last Rate: 5 mL/hr (03/08/25 1200)      Scheduled:  acetaminophen, 650 mg, q6h  aspirin, 81 mg, Daily  atorvastatin, 40 mg, Nightly  ceFAZolin, 2 g, q8h  insulin lispro, 0-15 Units, q4h  lidocaine, 1 patch, Daily  polyethylene glycol, 17 g, BID  sennosides-docusate sodium, 2 tablet, BID      PRN:  alteplase, 2 mg, PRN  calcium gluconate, 1 g, q6h PRN  calcium gluconate, 2 g, q6h PRN  dextrose, 25 g, q15 min PRN   Or  glucagon, 1 mg, q15 min PRN  hydrALAZINE, 10 mg, q4h PRN  HYDROmorphone, 0.2 mg, q15 min PRN  magnesium sulfate, 1 g, q6h PRN  magnesium sulfate, 2 g, q6h PRN  naloxone, 0.2 mg, q5 min PRN  ondansetron, 4 mg, q8h PRN   Or  ondansetron, 4 mg, q8h PRN  oxyCODONE, 10 mg, q4h PRN  oxyCODONE, 5 mg, q4h PRN  potassium chloride CR, 20 mEq, q6h PRN   Or  potassium chloride, 20 mEq, q6h PRN  potassium chloride CR, 40 mEq, q6h PRN   Or  potassium chloride, 40 mEq, q6h PRN        PHYSICAL EXAM:   Visit Vitals  /70   Pulse 81   Temp 37.2 °C (99 °F) (Temporal)   Resp 20   Ht 1.829 m (6')   Wt 104 kg (229 lb 8 oz)   SpO2 (!) 86%   BMI 31.13 kg/m²   Smoking Status Never   BSA 2.3 m²     Wt Readings from Last 5 Encounters:   03/07/25 104 kg (229 lb 8 oz)   02/28/25 105 kg (231 lb)   01/24/25 99.8 kg (220 lb)   11/27/24 102 kg (224 lb)   11/14/24 102 kg (224 lb 1.6 oz)     INTAKE/OUTPUT:  I/O last 3 completed shifts:  In: 2381.6 (22.9 mL/kg) [I.V.:450.6 (4.3 mL/kg); Blood:656; IV Piggyback:100]  Out: 2875 (27.6 mL/kg) [Urine:2555 (0.7 mL/kg/hr); Chest Tube:320]  Weight: 104.1 kg        LDA:  Arterial Line 03/07/25 Left Brachial (Active)   Placement Date/Time: 03/07/25 (c 0774   Size: 20 G  Orientation:  Left  Location: Brachial  Securement Method: Transparent dressing  Patient Tolerance: Tolerated well   Number of days: 1       Urethral Catheter Non-latex;Temperature probe (Active)   Placement Date/Time: 03/07/25 0812   Placed by: ZANA  Hand Hygiene Completed: Yes  Catheter Type: Non-latex;Temperature probe  Catheter Balloon Size: 10 mL  Urine Returned: Yes   Number of days: 1       Chest Tube 1 Mediastinal (Active)   No placement date or time found.   Tube Number: 1  Chest Tube Location: Mediastinal   Number of days:        Chest Tube 2 Mediastinal (Active)   No placement date or time found.   Tube Number: 2  Chest Tube Location: Mediastinal   Number of days:          Vent settings:  Vent Mode: Pressure support  FiO2 (%):  [50 %] 50 %  S RR:  [16] 16  S VT:  [620 mL] 620 mL  PEEP/CPAP (cm H2O):  [5 cm H20-8 cm H20] 5 cm H20  OK SUP:  [5 cm H20-8 cm H20] 5 cm H20  MAP (cm H2O):  [10-12] 10    Physical Exam:   Constitutional: Male patient sitting on chair, in no acute distress  Neurological: A+Ox3, no focal deficits, CAM negative  Eyes: Anicteric sclera, clear  Respiratory/Thorax: Diminished, clear breath sounds bilaterally, symmetric chest expansion, 3 chest tubes to -20 suction, serosanguinous drainage. V pacing wires present  Cardiovascular: ST, no murmurs appreciated  Gastrointestinal: Abdomen soft, nontender, nondistended, bowel sounds present  Genitourinary: Falcon in place draining clear yellow urine  Extremities: Palpable radial pulses 2+, 2+ pulses to bilateral PT/DP, no pitting edema  Skin: Warm and dry throughout, midline incision stable - dressing clean, dry, and intact  Psych: Calm and cooperative      Images:     Invasive Hemodynamics:    Most Recent Range Past 24hrs   BP (Art) 93/58 Arterial Line BP 1  Min: 77/50  Max: 122/74   MAP(Art) 72 mmHg Arterial Line MAP 1 (mmHg)   Min: 60 mmHg  Max: 93 mmHg   RA/CVP   No data recorded   PA   No data recorded   PA(mean)   No data recorded   CO   No data  "recorded   CI   No data recorded   Mixed Venous   No data recorded   SVR    No data recorded     If Devices present, use phrases:    For LVAD, .lhcticulvad   For ECMO, .lhcticuecmo  For Impella, .lhcticuimpella    Daily Risk Screen:  Line unnecessary, will be removed today  perioperative use for selected surgical procedures        Assessment/Plan   Assessment:  Arvind \"Geremias\" Rick is a 64 yoM w/ PMHx HTN, prostate CA w/ spinal mets, ascending aortic aneurysm who presents to the CTICU s/p ascending aortic repair with Dr. Flores 3/7.     Plan:  NEURO:  No significant PMHx. Acute post operative pain.  -->  - Serial neuro and pain assessments   - Scheduled Tylenol 650 mg q6hr   - PRN oxycodone  - PRN dilaudid for pain   - PT Consult, OOB to chair as tolerated, chair position if not tolerated   - CAM ICU score qshift  - Sleep/wake cycle hygiene     CV:  Patient has a history of HTN, ascending aortic aneurysm now s/p repair with Dr. Flores 3/7. Pre/Post EF: normal BiV Arrived to CTICU on nitroglycerin gtt @5. A/V epicardial wires set VVI @ 50 -->  - Maintain goal SBP < 120  - Mixed venous and CI Q4H  - Volume resuscitate as clinically indicated  - Maintain epicardial wires set VVI @ 50  - ASA today  - Hold home amlodipine, losartan     PULM:  No history of pulmonary disease.  Currently on NC. Post operative atelectasis. Chest tubes meds x2 to wall suction. -->  - F/u CXR  - Wean FiO2 maintaining SpO2 >92%.   - IS q1h and OOB to chair when extubated  - Chest tubes to wall suction.     GI:  PMH of hypogonadism, follows with PCP, no sxs and not on meds.-->  -Passed bedside swallow eval.  - Colace/senna BID and miralax BID  -Diet     :  CSA-DANIELLE Risk Score 3.  No history of renal disease, baseline CRE 0.7-0.9. Creatinine stable post-op. Mackay in place and making adequate UOP. -->  - Continue mackay catheter for strict I/Os.  - Goal UOP net negative 500 to 1 L  - Lasix 40 mg    - RFP as clinically indicated  - Replete " electrolytes per CTICU protocol      Variable Points   Male 1   Age < 40 0   Age 41-60 1   Age 61-80 2   Age > 81 3   CKD 1   NYHA > 2 1   Previous cardiac Surgery 1      Points Pre-Op ICU Admit   0-1 Low Low   2-3 Medium Low   4 High Low   5-9 High Medium   > 10 High High         ENDO:  No PMHx  -->  - Maintain BG <180, insulin per CTICU protocol     HEME:  H/o prostate CA w/ mets to spine, s/p rad 8/2022. Acute blood loss. -->    - Monitor drain output volume and characteristics  - CBC, coags, and fibrinogen post op and as clinically indicated  - Start ASA  - Start SQH    - SCDs for DVT prophylaxis.  - Last type and screen: 3/7     ID:  Afebrile, no current indications of infection. MRSA neg. -->  - Trend temp q4h  - Periop cefazolin x 48hrs     Skin:  No active skin issues.  - preventative Mepilex dressings in place on sacrum and heels  - change preventative Mepilex weekly or more frequently as indicated (when moist/soiled)   - every shift skin assessment per nursing and weekly ICU skin rounds  - moisture barrier to be applied with loc care  - active skin problems addressed with nursing on daily rounds     Proph:  SCDs  PPI  SQH     G:  Line  Right IJ MAC w Minimac placed 3/7- 3/8  Left brachial a-line placed 3/7- 3/8     F: Family: updated at bedside.      A,B,C,D,E,F,G: reviewed     Dispo: T3  CTICU TEAM PHONE [phone]    Matty Guzman MD  PY-4 Anesthesiology

## 2025-03-08 NOTE — PROGRESS NOTES
Postop Pain HPI -   Palliative: relieved with IV analgesics and regional local anesthetics  Provocative: movement  Quality:  burning and aching  Radiation:  none  Severity:  2/10  Timing: constant    24-HOUR OPIOID CONSUMPTION:  10 mg oxycodone  0.2 mg hydromorphone    Scheduled medications  acetaminophen, 650 mg, oral, q6h  aspirin, 81 mg, oral, Daily  atorvastatin, 40 mg, oral, Nightly  ceFAZolin, 2 g, intravenous, q8h  insulin lispro, 0-15 Units, subcutaneous, q4h  lidocaine, 1 patch, transdermal, Daily  polyethylene glycol, 17 g, oral, BID  sennosides-docusate sodium, 2 tablet, oral, BID      Continuous medications  lactated Ringer's, 30 mL/hr, Last Rate: 30 mL/hr (03/08/25 1100)  lactated Ringer's, 5 mL/hr, Last Rate: 5 mL/hr (03/08/25 1100)      PRN medications  PRN medications: alteplase, calcium gluconate, calcium gluconate, dextrose **OR** glucagon, hydrALAZINE, HYDROmorphone, magnesium sulfate, magnesium sulfate, naloxone, ondansetron **OR** ondansetron, oxyCODONE, oxyCODONE, potassium chloride CR **OR** potassium chloride, potassium chloride CR **OR** potassium chloride     Physical Exam:  Constitutional:  no distress, alert and cooperative  Eyes: clear sclera  Head/Neck: No apparent injury, trachea midline  Respiratory/Thorax: Patent airways, thorax symmetric, breathing comfortably  Cardiovascular: no pitting edema  Gastrointestinal: Nondistended  Musculoskeletal: ROM intact  Extremities: no clubbing  Neurological: alert, nunez x4  Psychological: Appropriate affect    Results for orders placed or performed during the hospital encounter of 03/07/25 (from the past 24 hours)   Magnesium   Result Value Ref Range    Magnesium 2.91 (H) 1.60 - 2.40 mg/dL   Coagulation Screen   Result Value Ref Range    Protime 12.3 9.8 - 12.4 seconds    INR 1.1 0.9 - 1.1    aPTT 29 26 - 36 seconds   Fibrinogen   Result Value Ref Range    Fibrinogen 219 200 - 400 mg/dL   CBC   Result Value Ref Range    WBC 14.3 (H) 4.4 - 11.3  x10*3/uL    nRBC 0.0 0.0 - 0.0 /100 WBCs    RBC 4.47 (L) 4.50 - 5.90 x10*6/uL    Hemoglobin 13.8 13.5 - 17.5 g/dL    Hematocrit 38.4 (L) 41.0 - 52.0 %    MCV 86 80 - 100 fL    MCH 30.9 26.0 - 34.0 pg    MCHC 35.9 32.0 - 36.0 g/dL    RDW 12.4 11.5 - 14.5 %    Platelets 191 150 - 450 x10*3/uL   Renal Function Panel   Result Value Ref Range    Glucose 152 (H) 74 - 99 mg/dL    Sodium 139 136 - 145 mmol/L    Potassium 4.1 3.5 - 5.3 mmol/L    Chloride 105 98 - 107 mmol/L    Bicarbonate 24 21 - 32 mmol/L    Anion Gap 14 10 - 20 mmol/L    Urea Nitrogen 20 6 - 23 mg/dL    Creatinine 0.80 0.50 - 1.30 mg/dL    eGFR >90 >60 mL/min/1.73m*2    Calcium 8.1 (L) 8.6 - 10.6 mg/dL    Phosphorus 2.6 2.5 - 4.9 mg/dL    Albumin 3.7 3.4 - 5.0 g/dL   Blood Gas Arterial Full Panel   Result Value Ref Range    POCT pH, Arterial 7.46 (H) 7.38 - 7.42 pH    POCT pCO2, Arterial 35 (L) 38 - 42 mm Hg    POCT pO2, Arterial 123 (H) 85 - 95 mm Hg    POCT SO2, Arterial 99 94 - 100 %    POCT Oxy Hemoglobin, Arterial 96.6 94.0 - 98.0 %    POCT Hematocrit Calculated, Arterial 42.0 41.0 - 52.0 %    POCT Sodium, Arterial 135 (L) 136 - 145 mmol/L    POCT Potassium, Arterial 4.2 3.5 - 5.3 mmol/L    POCT Chloride, Arterial 106 98 - 107 mmol/L    POCT Ionized Calcium, Arterial 1.09 (L) 1.10 - 1.33 mmol/L    POCT Glucose, Arterial 163 (H) 74 - 99 mg/dL    POCT Lactate, Arterial 1.4 0.4 - 2.0 mmol/L    POCT Base Excess, Arterial 1.4 -2.0 - 3.0 mmol/L    POCT HCO3 Calculated, Arterial 24.9 22.0 - 26.0 mmol/L    POCT Hemoglobin, Arterial 14.1 13.5 - 17.5 g/dL    POCT Anion Gap, Arterial 8 (L) 10 - 25 mmo/L    Patient Temperature 37.0 degrees Celsius    FiO2 50 %   POCT GLUCOSE   Result Value Ref Range    POCT Glucose 115 (H) 74 - 99 mg/dL   POCT GLUCOSE   Result Value Ref Range    POCT Glucose 135 (H) 74 - 99 mg/dL   Blood Gas Arterial Full Panel   Result Value Ref Range    POCT pH, Arterial 7.40 7.38 - 7.42 pH    POCT pCO2, Arterial 41 38 - 42 mm Hg    POCT pO2,  Arterial 138 (H) 85 - 95 mm Hg    POCT SO2, Arterial 100 94 - 100 %    POCT Oxy Hemoglobin, Arterial 97.8 94.0 - 98.0 %    POCT Hematocrit Calculated, Arterial 44.0 41.0 - 52.0 %    POCT Sodium, Arterial 136 136 - 145 mmol/L    POCT Potassium, Arterial 4.0 3.5 - 5.3 mmol/L    POCT Chloride, Arterial 106 98 - 107 mmol/L    POCT Ionized Calcium, Arterial 1.10 1.10 - 1.33 mmol/L    POCT Glucose, Arterial 159 (H) 74 - 99 mg/dL    POCT Lactate, Arterial 1.1 0.4 - 2.0 mmol/L    POCT Base Excess, Arterial 0.5 -2.0 - 3.0 mmol/L    POCT HCO3 Calculated, Arterial 25.4 22.0 - 26.0 mmol/L    POCT Hemoglobin, Arterial 14.5 13.5 - 17.5 g/dL    POCT Anion Gap, Arterial 9 (L) 10 - 25 mmo/L    Patient Temperature 37.0 degrees Celsius    FiO2 50 %   Blood Gas Arterial Full Panel   Result Value Ref Range    POCT pH, Arterial 7.43 (H) 7.38 - 7.42 pH    POCT pCO2, Arterial 38 38 - 42 mm Hg    POCT pO2, Arterial 90 85 - 95 mm Hg    POCT SO2, Arterial 99 94 - 100 %    POCT Oxy Hemoglobin, Arterial 96.0 94.0 - 98.0 %    POCT Hematocrit Calculated, Arterial 44.0 41.0 - 52.0 %    POCT Sodium, Arterial 135 (L) 136 - 145 mmol/L    POCT Potassium, Arterial 3.8 3.5 - 5.3 mmol/L    POCT Chloride, Arterial 106 98 - 107 mmol/L    POCT Ionized Calcium, Arterial 1.10 1.10 - 1.33 mmol/L    POCT Glucose, Arterial 173 (H) 74 - 99 mg/dL    POCT Lactate, Arterial 1.2 0.4 - 2.0 mmol/L    POCT Base Excess, Arterial 1.0 -2.0 - 3.0 mmol/L    POCT HCO3 Calculated, Arterial 25.2 22.0 - 26.0 mmol/L    POCT Hemoglobin, Arterial 14.6 13.5 - 17.5 g/dL    POCT Anion Gap, Arterial 8 (L) 10 - 25 mmo/L    Patient Temperature 37.0 degrees Celsius    FiO2 36 %   POCT GLUCOSE   Result Value Ref Range    POCT Glucose 148 (H) 74 - 99 mg/dL   POCT GLUCOSE   Result Value Ref Range    POCT Glucose 155 (H) 74 - 99 mg/dL   Calcium, Ionized   Result Value Ref Range    POCT Calcium, Ionized 1.11 1.1 - 1.33 mmol/L   Magnesium   Result Value Ref Range    Magnesium 2.35 1.60 - 2.40  mg/dL   CBC   Result Value Ref Range    WBC 11.6 (H) 4.4 - 11.3 x10*3/uL    nRBC 0.0 0.0 - 0.0 /100 WBCs    RBC 4.47 (L) 4.50 - 5.90 x10*6/uL    Hemoglobin 13.5 13.5 - 17.5 g/dL    Hematocrit 38.4 (L) 41.0 - 52.0 %    MCV 86 80 - 100 fL    MCH 30.2 26.0 - 34.0 pg    MCHC 35.2 32.0 - 36.0 g/dL    RDW 12.6 11.5 - 14.5 %    Platelets 224 150 - 450 x10*3/uL   Renal Function Panel   Result Value Ref Range    Glucose 148 (H) 74 - 99 mg/dL    Sodium 138 136 - 145 mmol/L    Potassium 3.7 3.5 - 5.3 mmol/L    Chloride 105 98 - 107 mmol/L    Bicarbonate 24 21 - 32 mmol/L    Anion Gap 13 10 - 20 mmol/L    Urea Nitrogen 19 6 - 23 mg/dL    Creatinine 0.74 0.50 - 1.30 mg/dL    eGFR >90 >60 mL/min/1.73m*2    Calcium 8.1 (L) 8.6 - 10.6 mg/dL    Phosphorus 2.8 2.5 - 4.9 mg/dL    Albumin 3.6 3.4 - 5.0 g/dL   POCT GLUCOSE   Result Value Ref Range    POCT Glucose 140 (H) 74 - 99 mg/dL   POCT GLUCOSE   Result Value Ref Range    POCT Glucose 185 (H) 74 - 99 mg/dL      Arvind Cabrera is a 64 y.o. year old male patient who presents for Procedure(s):  ASCENDING AORTA REPAIR WITH 30 MM GELWEAVE GRAFT with Daysi Tavarez MD on 3/7/2025. Acute Pain consulted for assistance with pain control.      Plan:     - Bilateral single shot serratus anterior blocks performed intra-operatively 3/7/2025  - Pain medications per primary team  - Acute pain will sign off     Acute Pain Resident  pg [phone] ph [phone]

## 2025-03-08 NOTE — PROGRESS NOTES
Physical Therapy    Physical Therapy Evaluation & Treatment    Patient Name: Geremias Cabrera  MRN: [id number]  Department: [facility]  Room: 04/04-A  Today's Date: 3/8/2025   Time Calculation  Start Time: 0930  Stop Time: 1006  Time Calculation (min): 36 min    Assessment/Plan   PT Assessment  PT Assessment Results: Decreased strength, Decreased endurance, Impaired balance, Decreased mobility, Pain  Rehab Prognosis: Good  Barriers to Discharge Home: No anticipated barriers  Evaluation/Treatment Tolerance: Patient tolerated treatment well  Medical Staff Made Aware: Yes  End of Session Communication: Bedside nurse  Assessment Comment: Pt performed bed mobility, functional transfers, and ambulated 200' with FWW with CGA. Pt will continue to benefit from skilled PT to improve functional mobility.  End of Session Patient Position: Bed, 3 rail up, Alarm off, not on at start of session   IP OR SWING BED PT PLAN  Inpatient or Swing Bed: Inpatient  PT Plan  Treatment/Interventions: Bed mobility, Transfer training, Gait training, Stair training, Balance training, Strengthening, Endurance training, Therapeutic exercise, Therapeutic activity, Home exercise program, Positioning  PT Plan: Ongoing PT  PT Frequency: 3 times per week  PT Discharge Recommendations: Low intensity level of continued care  Equipment Recommended upon Discharge: Wheeled walker  PT Recommended Transfer Status: Assist x1  PT - OK to Discharge: Yes      Subjective     General Visit Information:  General  Reason for Referral: presents to the CTICU s/p ascending aortic repair with Dr. Flores 3/7.  Past Medical History Relevant to Rehab: PMHx HTN, prostate CA w/ spinal mets, ascending aortic aneurysm  Prior to Session Communication: Bedside nurse  Patient Position Received: Up in chair, Alarm off, not on at start of session  Preferred Learning Style: auditory, visual, verbal  General Comment: Pt awake and willing to participate in PT evaluation. (Lines: A line, tele,  2 chest tubes, mackay, on room air, BP cuff)  Home Living:  Home Living  Type of Home: House  Lives With: Spouse  Home Adaptive Equipment: None  Home Layout: Two level, Bed/bath upstairs, Full bath main level  Home Access: Stairs to enter with rails  Entrance Stairs-Number of Steps: 3  Bathroom Shower/Tub: Tub/shower unit  Bathroom Equipment: None  Prior Level of Function:  Prior Function Per Pt/Caregiver Report  Level of [place]: Independent with ADLs and functional transfers  Receives Help From: Family  ADL Assistance: Independent  Homemaking Assistance: Independent  Ambulatory Assistance: Independent  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[employer])  Prior Function Comments: Drives, no falls  Precautions:  Precautions  Hearing/Visual Limitations: WFL; wears glasses  Medical Precautions: Cardiac precautions, Fall precautions, Chest tube  Post-Surgical Precautions: Move in the Tube  Precautions Comment: SBP<120; VVI@50; SpO2>92%     Date/Time Vitals Session Patient Position Pulse Resp SpO2 BP MAP (mmHg)    03/08/25 0930 Pre PT  Sitting  78  --  94 %  92/55  68     03/08/25 1000 --  --  82  27  93 %  112/71  83     03/08/25 1006 Post PT  Lying  77  --  95 %  93/53  67     03/08/25 1100 --  --  78  19  95 %  122/79  93                Objective   Pain:  Pain Assessment  Pain Assessment: 0-10  0-10 (Numeric) Pain Score: 2  Pain Type: Surgical pain  Pain Location: Chest  Pain Interventions: Repositioned  Response to Interventions: Resting quietly  Cognition:  Cognition  Overall Cognitive Status: Within Functional Limits  Orientation Level: Oriented X4    General Assessments:    Activity Tolerance  Endurance: Tolerates 10 - 20 min exercise with multiple rests  Early Mobility/Exercise Safety Screen: Proceed with mobilization - No exclusion criteria met  Activity Tolerance Comments: Vitals stable throughout session; SBP<120 throughout session    Sensation  Light Touch: No apparent  deficits    Strength  Strength Comments: Grossly at least 3/5 based on functional mobility  Strength  Strength Comments: Grossly at least 3/5 based on functional mobility    Perception  Inattention/Neglect: Appears intact  Initiation: Appears intact  Motor Planning: Appears intact  Perseveration: Not present    Coordination  Movements are Fluid and Coordinated: Yes    Postural Control  Postural Control: Within Functional Limits    Static Sitting Balance  Static Sitting-Balance Support: Feet supported, Bilateral upper extremity supported  Static Sitting-Level of Assistance: Close supervision    Static Standing Balance  Static Standing-Balance Support: Bilateral upper extremity supported  Static Standing-Level of Assistance: Contact guard  Functional Assessments:  Bed Mobility  Bed Mobility: Yes  Bed Mobility 1  Bed Mobility 1: Sitting to supine  Level of Assistance 1: Contact guard  Bed Mobility Comments 1: CGA for safety and line management    Transfers  Transfer: Yes  Transfer 1  Transfer From 1: Sit to, Stand to  Transfer to 1: Stand, Sit  Technique 1: Sit to stand, Stand to sit  Transfer Device 1: Walker  Transfer Level of Assistance 1: Contact guard  Trials/Comments 1: CGA for safety and line management; verbal cues for hand placement on FWW    Ambulation/Gait Training  Ambulation/Gait Training Performed: Refer to treatment section    Extremity/Trunk Assessments:  Cervical Spine   Cervical Spine: Within Functional Limits  Lumbar Spine   Lumbar Spine : Within Functional Limits    RLE   RLE : Within Functional Limits  LLE   LLE : Within Functional Limits    Treatments:  Ambulation/Gait Training  Ambulation/Gait Training Performed: Yes  Ambulation/Gait Training 1  Surface 1: Level tile  Device 1: Rolling walker  Assistance 1: Contact guard  Quality of Gait 1: Decreased step length, Forward flexed posture  Comments/Distance (ft) 1: x200' with FWW; CGA for safety and line management    Outcome Measures:  [facility] Basic  Mobility  Turning from your back to your side while in a flat bed without using bedrails: A little  Moving from lying on your back to sitting on the side of a flat bed without using bedrails: A little  Moving to and from bed to chair (including a wheelchair): A little  Standing up from a chair using your arms (e.g. wheelchair or bedside chair): A little  To walk in hospital room: A little  Climbing 3-5 steps with railing: A lot  Basic Mobility - Total Score: 17    FSS-ICU  Ambulation: Walks >/ or equal to 150 feet with minimal assistance x1  Rolling: Minimal assistance (performs 75% or more of task)  Sitting: Supervision or set-up only  Transfer Sit-to-Stand: Minimal assistance (performs 75% or more of task)  Transfer Supine-to-Sit: Minimal assistance (performs 75% or more of task)  Total Score: 21      Early Mobility/Exercise Safety Screen: Proceed with mobilization - No exclusion criteria met  ICU Mobility Scale: Walking with assistance of 1 person [8]    Encounter Problems       Encounter Problems (Active)       Balance       Pt will demonstrate improved sitting/standing static/dynamic balance activities without LOB via score of 24/28 on the [place] for increase in safety prior to DC.  (Progressing)       Start:  03/08/25    Expected End:  03/22/25               Mobility       Pt will tolerate >15 minutes of upright standing activity without seated rest breaks with no changes in vital signs for improved functional mobility.  (Progressing)       Start:  03/08/25    Expected End:  03/22/25            Pt will ambulate >300ft with appropriate form, SBA or less, LRAD, and no LOB for safe DC.  (Progressing)       Start:  03/08/25    Expected End:  03/22/25            Pt will ambulate up/down 3 stairs with hand rail with CGA or less to safely access their home upon DC.   (Progressing)       Start:  03/08/25    Expected End:  03/22/25               PT Transfers       Pt will perform bed mobility and sit<>stand transfers  with SBA or less and use of LRAD to safely DC.  (Progressing)       Start:  03/08/25    Expected End:  03/22/25                   Education Documentation  Precautions, taught by Isaiah Jama PT at 3/8/2025 11:18 AM.  Learner: Patient  Readiness: Acceptance  Method: Explanation  Response: Verbalizes Understanding    Body Mechanics, taught by Isaiah Jama PT at 3/8/2025 11:18 AM.  Learner: Patient  Readiness: Acceptance  Method: Explanation  Response: Verbalizes Understanding    Mobility Training, taught by Isaiah Jama PT at 3/8/2025 11:18 AM.  Learner: Patient  Readiness: Acceptance  Method: Explanation  Response: Verbalizes Understanding    Education Comments  No comments found.    ISAIAH JAMA PT

## 2025-03-08 NOTE — CARE PLAN
The patient's goals for the shift include        Problem: Fall/Injury  Goal: Not fall by end of shift  Outcome: Progressing  Goal: Be free from injury by end of the shift  Outcome: Progressing  Goal: Verbalize understanding of personal risk factors for fall in the hospital  Outcome: Progressing  Goal: Verbalize understanding of risk factor reduction measures to prevent injury from fall in the home  Outcome: Progressing  Goal: Use assistive devices by end of the shift  Outcome: Progressing  Goal: Pace activities to prevent fatigue by end of the shift  Outcome: Progressing     Problem: Pain - Adult  Goal: Verbalizes/displays adequate comfort level or baseline comfort level  Outcome: Progressing     Problem: Safety - Adult  Goal: Free from fall injury  Outcome: Progressing     Problem: Chronic Conditions and Co-morbidities  Goal: Patient's chronic conditions and co-morbidity symptoms are monitored and maintained or improved  Outcome: Progressing     Problem: Nutrition  Goal: Nutrient intake appropriate for maintaining nutritional needs  Outcome: Progressing

## 2025-03-09 ENCOUNTER — APPOINTMENT (OUTPATIENT)
Dept: RADIOLOGY | Facility: HOSPITAL | Age: 65
DRG: 220 | End: 2025-03-09
Payer: COMMERCIAL

## 2025-03-09 VITALS
TEMPERATURE: 98.6 F | WEIGHT: 230.4 LBS | OXYGEN SATURATION: 94 % | HEIGHT: 72 IN | HEART RATE: 71 BPM | RESPIRATION RATE: 18 BRPM | DIASTOLIC BLOOD PRESSURE: 71 MMHG | SYSTOLIC BLOOD PRESSURE: 112 MMHG | BODY MASS INDEX: 31.21 KG/M2

## 2025-03-09 LAB
ALBUMIN SERPL BCP-MCNC: 3.7 G/DL (ref 3.4–5)
ANION GAP SERPL CALC-SCNC: 10 MMOL/L (ref 10–20)
BUN SERPL-MCNC: 21 MG/DL (ref 6–23)
CALCIUM SERPL-MCNC: 8.3 MG/DL (ref 8.6–10.6)
CHLORIDE SERPL-SCNC: 102 MMOL/L (ref 98–107)
CO2 SERPL-SCNC: 26 MMOL/L (ref 21–32)
CREAT SERPL-MCNC: 0.76 MG/DL (ref 0.5–1.3)
EGFRCR SERPLBLD CKD-EPI 2021: >90 ML/MIN/1.73M*2
ERYTHROCYTE [DISTWIDTH] IN BLOOD BY AUTOMATED COUNT: 13.1 % (ref 11.5–14.5)
GLUCOSE BLD MANUAL STRIP-MCNC: 107 MG/DL (ref 74–99)
GLUCOSE BLD MANUAL STRIP-MCNC: 120 MG/DL (ref 74–99)
GLUCOSE BLD MANUAL STRIP-MCNC: 129 MG/DL (ref 74–99)
GLUCOSE BLD MANUAL STRIP-MCNC: 154 MG/DL (ref 74–99)
GLUCOSE SERPL-MCNC: 108 MG/DL (ref 74–99)
HCT VFR BLD AUTO: 40.5 % (ref 41–52)
HGB BLD-MCNC: 13.1 G/DL (ref 13.5–17.5)
MAGNESIUM SERPL-MCNC: 2.49 MG/DL (ref 1.6–2.4)
MCH RBC QN AUTO: 30.3 PG (ref 26–34)
MCHC RBC AUTO-ENTMCNC: 32.3 G/DL (ref 32–36)
MCV RBC AUTO: 94 FL (ref 80–100)
NRBC BLD-RTO: 0 /100 WBCS (ref 0–0)
PHOSPHATE SERPL-MCNC: 2.6 MG/DL (ref 2.5–4.9)
PLATELET # BLD AUTO: 185 X10*3/UL (ref 150–450)
POTASSIUM SERPL-SCNC: 4.4 MMOL/L (ref 3.5–5.3)
RBC # BLD AUTO: 4.32 X10*6/UL (ref 4.5–5.9)
SODIUM SERPL-SCNC: 134 MMOL/L (ref 136–145)
WBC # BLD AUTO: 15.8 X10*3/UL (ref 4.4–11.3)

## 2025-03-09 PROCEDURE — 2500000004 HC RX 250 GENERAL PHARMACY W/ HCPCS (ALT 636 FOR OP/ED)

## 2025-03-09 PROCEDURE — 71045 X-RAY EXAM CHEST 1 VIEW: CPT

## 2025-03-09 PROCEDURE — 82947 ASSAY GLUCOSE BLOOD QUANT: CPT

## 2025-03-09 PROCEDURE — 94669 MECHANICAL CHEST WALL OSCILL: CPT

## 2025-03-09 PROCEDURE — 71045 X-RAY EXAM CHEST 1 VIEW: CPT | Performed by: RADIOLOGY

## 2025-03-09 PROCEDURE — 99232 SBSQ HOSP IP/OBS MODERATE 35: CPT

## 2025-03-09 PROCEDURE — 2500000001 HC RX 250 WO HCPCS SELF ADMINISTERED DRUGS (ALT 637 FOR MEDICARE OP): Performed by: NURSE PRACTITIONER

## 2025-03-09 PROCEDURE — 94668 MNPJ CHEST WALL SBSQ: CPT

## 2025-03-09 PROCEDURE — 83735 ASSAY OF MAGNESIUM: CPT | Performed by: NURSE PRACTITIONER

## 2025-03-09 PROCEDURE — 2500000005 HC RX 250 GENERAL PHARMACY W/O HCPCS: Performed by: NURSE PRACTITIONER

## 2025-03-09 PROCEDURE — 85027 COMPLETE CBC AUTOMATED: CPT | Performed by: NURSE PRACTITIONER

## 2025-03-09 PROCEDURE — 93010 ELECTROCARDIOGRAM REPORT: CPT | Performed by: INTERNAL MEDICINE

## 2025-03-09 PROCEDURE — 36415 COLL VENOUS BLD VENIPUNCTURE: CPT | Performed by: NURSE PRACTITIONER

## 2025-03-09 PROCEDURE — 80069 RENAL FUNCTION PANEL: CPT | Performed by: NURSE PRACTITIONER

## 2025-03-09 PROCEDURE — 1200000002 HC GENERAL ROOM WITH TELEMETRY DAILY

## 2025-03-09 PROCEDURE — 2500000004 HC RX 250 GENERAL PHARMACY W/ HCPCS (ALT 636 FOR OP/ED): Performed by: NURSE PRACTITIONER

## 2025-03-09 PROCEDURE — 94640 AIRWAY INHALATION TREATMENT: CPT

## 2025-03-09 PROCEDURE — 2500000004 HC RX 250 GENERAL PHARMACY W/ HCPCS (ALT 636 FOR OP/ED): Performed by: STUDENT IN AN ORGANIZED HEALTH CARE EDUCATION/TRAINING PROGRAM

## 2025-03-09 PROCEDURE — 2500000002 HC RX 250 W HCPCS SELF ADMINISTERED DRUGS (ALT 637 FOR MEDICARE OP, ALT 636 FOR OP/ED): Performed by: NURSE PRACTITIONER

## 2025-03-09 PROCEDURE — 2500000001 HC RX 250 WO HCPCS SELF ADMINISTERED DRUGS (ALT 637 FOR MEDICARE OP)

## 2025-03-09 RX ORDER — METOPROLOL TARTRATE 25 MG/1
12.5 TABLET, FILM COATED ORAL 2 TIMES DAILY
Status: DISPENSED | OUTPATIENT
Start: 2025-03-09

## 2025-03-09 RX ORDER — AMIODARONE HYDROCHLORIDE 200 MG/1
200 TABLET ORAL DAILY
Status: ACTIVE | OUTPATIENT
Start: 2025-03-11

## 2025-03-09 RX ORDER — METOPROLOL TARTRATE 1 MG/ML
5 INJECTION, SOLUTION INTRAVENOUS ONCE
Status: COMPLETED | OUTPATIENT
Start: 2025-03-09 | End: 2025-03-09

## 2025-03-09 RX ORDER — FUROSEMIDE 10 MG/ML
40 INJECTION INTRAMUSCULAR; INTRAVENOUS ONCE
Status: COMPLETED | OUTPATIENT
Start: 2025-03-09 | End: 2025-03-09

## 2025-03-09 RX ORDER — METOPROLOL TARTRATE 1 MG/ML
INJECTION, SOLUTION INTRAVENOUS
Status: COMPLETED
Start: 2025-03-09 | End: 2025-03-09

## 2025-03-09 RX ADMIN — Medication 1 TABLET: at 08:35

## 2025-03-09 RX ADMIN — AMIODARONE HYDROCHLORIDE 150 MG: 1.5 INJECTION, SOLUTION INTRAVENOUS at 03:28

## 2025-03-09 RX ADMIN — AMIODARONE HYDROCHLORIDE 0.5 MG/MIN: 1.8 INJECTION, SOLUTION INTRAVENOUS at 10:00

## 2025-03-09 RX ADMIN — ACETAMINOPHEN 650 MG: 325 TABLET ORAL at 03:14

## 2025-03-09 RX ADMIN — Medication 0.5 L/MIN: at 08:57

## 2025-03-09 RX ADMIN — METOPROLOL TARTRATE 5 MG: 1 INJECTION, SOLUTION INTRAVENOUS at 01:53

## 2025-03-09 RX ADMIN — ATORVASTATIN CALCIUM 40 MG: 40 TABLET, FILM COATED ORAL at 20:18

## 2025-03-09 RX ADMIN — METOPROLOL TARTRATE 5 MG: 1 INJECTION, SOLUTION INTRAVENOUS at 03:07

## 2025-03-09 RX ADMIN — FUROSEMIDE 40 MG: 10 INJECTION, SOLUTION INTRAVENOUS at 11:33

## 2025-03-09 RX ADMIN — ACETAMINOPHEN 650 MG: 325 TABLET ORAL at 17:08

## 2025-03-09 RX ADMIN — LIDOCAINE 1 PATCH: 4 PATCH TOPICAL at 08:35

## 2025-03-09 RX ADMIN — AMIODARONE HYDROCHLORIDE 0.5 MG/MIN: 1.8 INJECTION, SOLUTION INTRAVENOUS at 22:45

## 2025-03-09 RX ADMIN — ASPIRIN 81 MG: 81 TABLET, COATED ORAL at 08:35

## 2025-03-09 RX ADMIN — ACETAMINOPHEN 650 MG: 325 TABLET ORAL at 08:35

## 2025-03-09 RX ADMIN — CEFAZOLIN SODIUM 2 G: 2 INJECTION, SOLUTION INTRAVENOUS at 08:34

## 2025-03-09 RX ADMIN — AMIODARONE HYDROCHLORIDE 1 MG/MIN: 1.8 INJECTION, SOLUTION INTRAVENOUS at 03:42

## 2025-03-09 RX ADMIN — METOPROLOL TARTRATE 12.5 MG: 25 TABLET, FILM COATED ORAL at 20:18

## 2025-03-09 RX ADMIN — POLYSACCHARIDE-IRON COMPLEX 150 MG: 150 CAPSULE ORAL at 08:35

## 2025-03-09 RX ADMIN — INSULIN LISPRO 2 UNITS: 100 INJECTION, SOLUTION INTRAVENOUS; SUBCUTANEOUS at 08:34

## 2025-03-09 RX ADMIN — CEFAZOLIN SODIUM 2 G: 2 INJECTION, SOLUTION INTRAVENOUS at 00:06

## 2025-03-09 ASSESSMENT — PAIN SCALES - GENERAL
PAINLEVEL_OUTOF10: 2
PAINLEVEL_OUTOF10: 0 - NO PAIN
PAINLEVEL_OUTOF10: 3
PAINLEVEL_OUTOF10: 2
PAINLEVEL_OUTOF10: 0 - NO PAIN
PAINLEVEL_OUTOF10: 2

## 2025-03-09 ASSESSMENT — PAIN - FUNCTIONAL ASSESSMENT
PAIN_FUNCTIONAL_ASSESSMENT: 0-10

## 2025-03-09 ASSESSMENT — COGNITIVE AND FUNCTIONAL STATUS - GENERAL
STANDING UP FROM CHAIR USING ARMS: A LITTLE
MOVING FROM LYING ON BACK TO SITTING ON SIDE OF FLAT BED WITH BEDRAILS: A LITTLE
HELP NEEDED FOR BATHING: A LITTLE
EATING MEALS: A LITTLE
PERSONAL GROOMING: A LITTLE
MOVING TO AND FROM BED TO CHAIR: A LITTLE
TURNING FROM BACK TO SIDE WHILE IN FLAT BAD: A LITTLE
CLIMB 3 TO 5 STEPS WITH RAILING: A LITTLE
DRESSING REGULAR LOWER BODY CLOTHING: A LITTLE
TURNING FROM BACK TO SIDE WHILE IN FLAT BAD: A LITTLE
WALKING IN HOSPITAL ROOM: A LITTLE
CLIMB 3 TO 5 STEPS WITH RAILING: A LITTLE
MOVING TO AND FROM BED TO CHAIR: A LITTLE
MOBILITY SCORE: 18
MOBILITY SCORE: 18
PERSONAL GROOMING: A LITTLE
TOILETING: A LITTLE
DAILY ACTIVITIY SCORE: 18
DRESSING REGULAR UPPER BODY CLOTHING: A LITTLE
HELP NEEDED FOR BATHING: A LITTLE
STANDING UP FROM CHAIR USING ARMS: A LITTLE
WALKING IN HOSPITAL ROOM: A LITTLE
DRESSING REGULAR UPPER BODY CLOTHING: A LITTLE
DRESSING REGULAR LOWER BODY CLOTHING: A LITTLE
DAILY ACTIVITIY SCORE: 18
MOVING FROM LYING ON BACK TO SITTING ON SIDE OF FLAT BED WITH BEDRAILS: A LITTLE
TOILETING: A LITTLE
EATING MEALS: A LITTLE

## 2025-03-09 ASSESSMENT — ACTIVITIES OF DAILY LIVING (ADL): LACK_OF_TRANSPORTATION: NO

## 2025-03-09 ASSESSMENT — PAIN DESCRIPTION - DESCRIPTORS: DESCRIPTORS: ACHING;SORE

## 2025-03-09 ASSESSMENT — PAIN DESCRIPTION - ORIENTATION: ORIENTATION: MID

## 2025-03-09 ASSESSMENT — PAIN DESCRIPTION - LOCATION: LOCATION: INCISION

## 2025-03-09 NOTE — OP NOTE
"ASCENDING AORTA REPAIR WITH 30 MM GELWEAVE GRAFT Operative Note     Date: 3/7/2025  OR Location: [facility] OR    Name: Arvind Cabrera \"Geremias\", : 1960, Age: 64 y.o., MRN: [id number], Sex: male    Diagnosis  Pre-op Diagnosis      * Aneurysm of ascending aorta without rupture (CMS-HCC) [I71.21] Post-op Diagnosis     * Aneurysm of ascending aorta without rupture (CMS-HCC) [I71.21]     Procedures  ASCENDING AORTA REPAIR WITH 30 MM GELWEAVE GRAFT  74562 - TX AS-AORT GRF W/CARD BYP F/AORTIC DS OTH/THN DSJ  Median Sternotomy  Central Cannulation  Replacement of the ascending aorta with 30 mm Gelweave graft  Michael Sternal Plates x three    Surgeons      * Daysi Tavarez - Primary    Resident/Fellow/Other Assistant:  Juana: Assistance and closure  Cesiliaiy: Assistance and closure    Staff:   Circulator: Sonia Sauceda Person: Kelli  Surgical Assistant: Jamar  Surgical Assistant: Josh    Anesthesia Staff: Anesthesiologist: Juliana Carlos MD  CRNA: ONEIL Murguia-CRNA  C-AA: GREGORIA Levine  Perfusionist: Sharon Huntley    Procedure Summary  Anesthesia: Regional, General  ASA: III  Estimated Blood Loss: 250 mL  Intra-op Medications:   Administrations occurring from 0715 to 1650 on 25:   Medication Name Total Dose   sodium chloride 0.9 % irrigation solution 4,000 mL   vancomycin (Vancocin) vial for injection 9.5 g   acetaminophen (Tylenol) tablet 650 mg 650 mg   polyethylene glycol (Glycolax, Miralax) packet 17 g 17 g   sennosides-docusate sodium (Vicky-Colace) 8.6-50 mg per tablet 2 tablet 2 tablet   ceFAZolin (Ancef) 2 g in dextrose (iso)  mL 2 g   aspirin chewable tablet 81 mg 81 mg   calcium chloride 100 mg/mL syringe 1 g   ceFAZolin (Ancef) vial 1 g 2 g   dexAMETHasone (Decadron) 10 mg/mL 4 mg   electrolyte-A (Plasmalyte-A) Cannot be calculated   fentaNYL (Sublimaze) injection 50 mcg/mL 600 mcg   heparin injection 1,000 units/mL 40,000 Units "   insulin lispro injection 0-15 Units Cannot be calculated   ketamine (Ketalar) 10 mg/mL injection 50 mg   lidocaine (Xylocaine) injection 1 % 100 mg   lidocaine (Xylocaine) injection 2 % 100 mg   magnesium sulfate 50 % injection 2 g   midazolam PF (Versed) injection 1 mg/mL 2 mg   nitroglycerin (Tridil) 50 mg/250 mL D5W (0.2 mg/mL) infusion 350 mcg   nitroglycerin 100 mcg/mL D5W intracoronary syringe - compounded 350 mcg   norepinephrine (Levophed) 16 mcg/mL syringe for Anesthesia 24 mcg   oxygen (O2) therapy 120 L   phenylephrine (Peña-Synephrine) 10 mg/250 mL NS (40 mcg/mL) infusion 0.65 mg   phenylephrine (Peña-Synephrine) injection 200 mcg   propofol (Diprivan) IV infusion 523.4 mg   propofol (Diprivan) infusion 83.2 mg   propofol (Diprivan) infusion 32.24 mg   protamine injection 425 mg   rocuronium (ZeMuron) 50 mg/5 mL injection 180 mg   ropivacaine (Naropin) injection 0.5 % 50 mL   sodium bicarbonate injection 1 mEq/mL 25 mEq   sugammadex (Bridion) 200 mg/2 mL injection 200 mg   tranexamic acid (Cyklokapron) 5,000 mg in sodium chloride 0.9% 250 mL (20 mg/mL) infusion 2,739.71 mg         Intraprocedure I/O Totals       None           Specimen:   ID Type Source Tests Collected by Time   1 : AORTA Tissue AORTA SURGICAL PATHOLOGY EXAM Daysi Tavarez MD 3/7/2025 0939                 Drains and/or Catheters:   Chest Tube 1 Mediastinal (Active)   Function To water seal 03/09/25 1021   Chest Tube Air Leak Yes 03/09/25 0900   Patency Intervention Tip/tilt 03/09/25 0900   Drainage Description Serosanguineous 03/09/25 0900   Dressing Status Clean;Dry 03/09/25 0900   Site Assessment Not assessed 03/08/25 2040   Surrounding Skin Unable to view 03/08/25 2040   Output (mL) 0 mL 03/09/25 0900       Chest Tube 2 Mediastinal (Active)   Function To water seal 03/09/25 1021   Chest Tube Air Leak No 03/09/25 0900   Patency Intervention Tip/tilt 03/09/25 0900   Drainage Description Serosanguineous 03/09/25 0900  "  Dressing Status Dry;Clean 03/09/25 0900   Site Assessment Not assessed 03/08/25 2040   Surrounding Skin Unable to view 03/08/25 2040   Output (mL) 0 mL 03/09/25 0900       [REMOVED] Urethral Catheter Non-latex;Temperature probe (Removed)   Site Assessment Clean;Skin intact 03/08/25 1200   Collection Container Standard drainage bag 03/08/25 1200   Securement Method Securing device (Describe) 03/08/25 1200   Reason for Continuing Urinary Catheterization accurate hourly measurement of urine volume in a critically ill patient that cannot be assessed by other volumes and urine collection strategies 03/08/25 1200   Output (mL) 125 mL 03/08/25 1400       Tourniquet Times:         Implants:  Implants       Type Name Action Serial No.      Heart Valve CARDIOPLEGIA SET - [id number] Used, Not Implanted      Implant GRAFT, VASCUTEK GELWEAVE 30 X 30 - [id number] Implanted [id number]     Screw SCREW, SD LOCKING, 2.3 X 14MM - [id number] Implanted      Screw SCREW, SD LOCKING, 2.3 X 15MM - [id number] Implanted      Screw JL-PLATE, LOW PROFILE - [id number] Implanted      Screw PLATE, LP MANUBRIUM - [id number] Implanted      Screw PLATE, BOX, LARGE - [id number] Implanted      Screw JL-PLATE, LOW PROFILE - [id number] Implanted               Findings: Aneurysm of the ascending aorta  Tricuspid aortic valve    Indications: Arvind Cabrera \"Ubaldo" is an 64 y.o. male who is having surgery for Aneurysm of ascending aorta without rupture (CMS-[facility]) [I71.21].     The patient was seen in the preoperative area. The risks, benefits, complications, treatment options, non-operative alternatives, expected recovery and outcomes were discussed with the patient. The possibilities of reaction to medication, pulmonary aspiration, injury to surrounding structures, bleeding, recurrent infection, the need for additional procedures, failure to diagnose a condition, and creating a complication requiring transfusion or operation were discussed with the " patient. The patient concurred with the proposed plan, giving informed consent.  The site of surgery was properly noted/marked if necessary per policy. The patient has been actively warmed in preoperative area. Preoperative antibiotics have been ordered and given within 1 hours of incision. Venous thrombosis prophylaxis are not indicated.    Procedure Details:   DESCRIPTION OF PROCEDURE:  With the patient supine on the operating table, the patient was prepped and draped. A median sternotomy was performed.  The thymus was divided and the pericardium was opened. The ascending aorta was palpated and was without atherosclerosis and suitable for cannulation.     The patient was heparinized. The aortic arch and right atrium were cannulated for cardiopulmonary bypass and antegrade cardioplegia cannula was placed. The patient was placed on cardiopulmonary bypass, the ascending aorta was cross-clamped, and the heart was protected and arrested with [place] cardioplegia.      The aorta was transected at the sinotubular junction. The ascending aorta was then replaced with a 30 millimeters Gelweave vascular graft.  The graft was sutured in both proximally and distally in end-to-end fashion with running 4-0 Prolene suture.  The heart and ascending aorta were de-aired and the aortic cross-clamp was removed. The patient was weaned from cardiopulmonary bypass without difficulty.    All cannulas were removed and the heparin effect was reversed with protamine.  Hemostasis was obtained    Two chest tubes were placed, right ventricular pacemaker wires were placed on the diaphragmatic surface of the RV, and the he sternum was closed with interrupted stainless steel wires and subcutaneous layers were closed using Vicryl and skin was closed using subcuticular Vicryl.     The patient tolerated the procedure well and was brought to the intensive care unit in stable condition.  To sponge counts, instrument counts, and needle counts were  reported correct by the circulating nurse.  The specimen sent to pathology included the aortic valve leaflets.  The estimated blood loss was 250 cc.     Complications:  None; patient tolerated the procedure well.    Disposition: ICU - intubated and hemodynamically stable.  Condition: stable     Additional Details:   Cardio Pulmonary Bypass Time: 51 min  Cross-clamp Time: 44 min    Attending Attestation: I was present and scrubbed for the entire procedure.    Daysi Tavarez  Phone Number: [phone]

## 2025-03-09 NOTE — HOSPITAL COURSE
"Arvind \"Geremias\" Rick is a 64 yoM w/ PMHx HTN, prostate CA w/ spinal mets, ascending aortic aneurysm who presents to the CTICU s/p ascending aortic repair with Dr. Flores 3/7.    3/7/2025 OPERATION: by Daysi Flores  1. Median Sternotomy  2. Central Cannulation  3. ASCENDING AORTA REPAIR WITH 30 MM GELWEAVE GRAFT  4. Michael Sternal Plates x three    Echo Pre/Post: Normal BiV, mild MR, mild NE  Chest Tubes/Drains: 2x meds   Temporary wires location/setting: VVI @ 50     CTICU course: uneventful    Transferred to the floor 3/8    ========================================    Floor Course:  - Patient was diuresed for fluid volume overload post cardiac surgery; Preop weight: ***kg, discharge wt: ***kg  - On ASA, statin, BB, *** by discharge  - Epicardial wires {cut / pull epicardial wires:84845} on ***  - Telemetry at discharge ***  - 2v CXR done ***  - Postop echo done ***  - Postop CTA done ***  - Cardiac rehab referral was placed  - PT recs {PT recs for dc:94654}  - Anticipate discharge to {discharge disposition:86553}    Discharged on ***      On day of discharge, vital signs were stable and no acute distress was noted. All questions were answered. After VS and labs were reviewed it was determined the patient was stable for discharge.   Hospital day of discharge management- spent >30 minutes coordinating the discharge and counseling/educating patient and family regarding discharge instructions.     ========================================    HISTORY:  Past Medical History:  Diagnosis Date  · Adverse effect of anesthesia      Reports significant hypotension following OR during prostate surgery, states during PACU and was told it was during extubation of anesthesia.  · Anesthesia of skin      Numbness and tingling  · Arthritis      hips  · Carpal tunnel syndrome 11/19/2024  · Chronic bronchitis (Multi)    · High prostate specific antigen (PSA) 02/17/2023  · History of prostate cancer 03/17/2023  · Hypertension  "   · Numbness and tingling sensation of skin 11/19/2024  · Pain in left shoulder 10/13/2020    Shoulder pain, left  · Peripheral neuropathy    · Personal history of other specified conditions 11/22/2021    History of elevated prostate specific antigen (PSA)  · Postoperative complication 11/19/2024  · Prostate cancer (Multi)      2021 s/p prostatectomy and Radiation therapy  · Spondylosis without myelopathy or radiculopathy, lumbar region 08/26/2022  · Thoracic ascending aortic aneurysm (CMS-HCC)      CT Chest scan on 2/7/25-measuring 5.0 cm       Surgical History  Past Surgical History:  Procedure Laterality Date  · CARDIAC CATHETERIZATION N/A 01/24/2025    Procedure: Left & Right Heart Cath w Angiography & LV;  Surgeon: Santi Monroe MD;  Location: [place] [address] Cardiac Cath Lab;  Service: Cardiovascular;  Laterality: N/A;  request 1/24 or 1/31  · OTHER SURGICAL HISTORY   03/23/2020    Carpal tunnel surgery  · OTHER SURGICAL HISTORY   12/22/2021    Robotic-assisted laparoscopic radical prostatectomy       Prescriptions Prior to Admission  Medications Prior to Admission  Medication Sig Dispense Refill Last Dose/Taking  · amLODIPine (Norvasc) 5 mg tablet TAKE 2 TABLETS DAILY. (Patient taking differently: Take 2 tablets (10 mg) by mouth once daily. Takes #1 tablet twice daily) 180 tablet 3 3/6/2025  · chlorhexidine (Hibiclens) 4 % external liquid Use for 5 days prior to surgery as body wash, do not use on face or genital region 473 mL 0 3/7/2025 Morning  · chlorhexidine (Peridex) 0.12 % solution Use 15 mL in the mouth or throat if needed for wound care for up to 2 days. Use as mouth wash for night before and morning of surgery 473 mL 0 3/7/2025 Morning  · losartan (Cozaar) 50 mg tablet TAKE 1 TABLET BY MOUTH 2 TIMES A  tablet 3 3/6/2025       Patient has no known allergies.  Social History  Social History       Tobacco Use  · Smoking status: Never      Passive exposure: Never  · Smokeless  tobacco: Never  Substance Use Topics  · Alcohol use: Yes      Comment: once every other week  · Drug use: Never  ========================================

## 2025-03-09 NOTE — PROGRESS NOTES
"CARDIAC SURGERY DAILY PROGRESS NOTE    Arvind \"Geremias\" Rick is a 64 yoM w/ PMHx HTN, prostate CA w/ spinal mets, ascending aortic aneurysm who presents to the CTICU s/p ascending aortic repair with Dr. Flores 3/7.    3/7/2025 OPERATION with Daysi Flores  1. Median Sternotomy  2. Central Cannulation  3. ASCENDING AORTA REPAIR WITH 30 MM GELWEAVE GRAFT  4. Armstrong Sternal Plates x three    Echo Pre/Post: Normal BiV, mild MR, mild NJ  Chest Tubes/Drains: 2x meds   Temporary wires location/setting: VVI @ 50     CTICU course: uneventful    Transferred to the floor 3/8    Interval History:   0248 AF RVR -> metoprolol 5 mg IV x2 with minimal response with subsequent administration of amiodarone bolus and drip -> SR at around 10AM today.     SUBJECTIVE:  Patient seen and examined. Tele showed AF -> SR at around 1000, on amio drip. On 2L -> RA after Lasix 40 mg IV x1. Patient reports generalized discomfort.     Objective   /77 (BP Location: Right arm, Patient Position: Sitting)   Pulse 81   Temp 37 °C (98.6 °F) (Temporal)   Resp 18   Ht 1.829 m (6')   Wt 105 kg (230 lb 6.4 oz)   SpO2 95%   BMI 31.25 kg/m²   0-10 (Numeric) Pain Score: 3   3 Day Weight Change: Unable to Calculate    Intake and Output    Intake/Output Summary (Last 24 hours) at 3/9/2025 1827  Last data filed at 3/9/2025 1700  Gross per 24 hour   Intake 1115.62 ml   Output 1480 ml   Net -364.38 ml       Physical Exam  Physical Exam  Vitals and nursing note reviewed.   HENT:      Head: Normocephalic and atraumatic.      Nose: Nose normal.      Mouth/Throat:      Mouth: Mucous membranes are moist.   Eyes:      Conjunctiva/sclera: Conjunctivae normal.   Cardiovascular:      Rate and Rhythm: Tachycardia present. Rhythm irregular.      Heart sounds: Normal heart sounds.      Comments: Tele: AF -> SR today (3/9)  V wires connected to temp pacer set to VVI 50  Pulmonary:      Effort: Pulmonary effort is normal.      Comments: 2L to RA   Posterior " diminished lungs sounds at bases bilateral   Abdominal:      General: Bowel sounds are normal.      Palpations: Abdomen is soft.   Musculoskeletal:      Cervical back: Neck supple.      Comments: STORY   Skin:     General: Skin is warm and dry.      Capillary Refill: Capillary refill takes less than 2 seconds.      Comments: Mid sternum incision well approximated, no erythema, no drainage.    Neurological:      General: No focal deficit present.      Mental Status: He is alert and oriented to person, place, and time. Mental status is at baseline.   Psychiatric:         Mood and Affect: Mood normal.         Behavior: Behavior normal.         Medications  Scheduled medications  acetaminophen, 650 mg, oral, q6h  [START ON 3/11/2025] amiodarone, 200 mg, oral, Daily  aspirin, 81 mg, oral, Daily  atorvastatin, 40 mg, oral, Nightly  insulin lispro, 0-10 Units, subcutaneous, TID AC  iron polysaccharides, 150 mg, oral, Daily  lidocaine, 1 patch, transdermal, Daily  multivitamin with minerals, 1 tablet, oral, Daily  oxygen, , inhalation, Continuous - 02/gases  polyethylene glycol, 17 g, oral, BID  sennosides-docusate sodium, 2 tablet, oral, BID    Continuous medications  amiodarone, 0.5 mg/min, Last Rate: 0.5 mg/min (03/09/25 1000)    PRN medications  PRN medications: bisacodyl, dextrose **OR** glucagon, hydrALAZINE, HYDROmorphone, naloxone, ondansetron **OR** ondansetron, oxyCODONE, oxyCODONE    Labs  Results for orders placed or performed during the hospital encounter of 03/07/25 (from the past 24 hours)   POCT GLUCOSE   Result Value Ref Range    POCT Glucose 120 (H) 74 - 99 mg/dL   POCT GLUCOSE   Result Value Ref Range    POCT Glucose 154 (H) 74 - 99 mg/dL   Magnesium   Result Value Ref Range    Magnesium 2.49 (H) 1.60 - 2.40 mg/dL   CBC   Result Value Ref Range    WBC 15.8 (H) 4.4 - 11.3 x10*3/uL    nRBC 0.0 0.0 - 0.0 /100 WBCs    RBC 4.32 (L) 4.50 - 5.90 x10*6/uL    Hemoglobin 13.1 (L) 13.5 - 17.5 g/dL    Hematocrit 40.5  (L) 41.0 - 52.0 %    MCV 94 80 - 100 fL    MCH 30.3 26.0 - 34.0 pg    MCHC 32.3 32.0 - 36.0 g/dL    RDW 13.1 11.5 - 14.5 %    Platelets 185 150 - 450 x10*3/uL   Renal Function Panel   Result Value Ref Range    Glucose 108 (H) 74 - 99 mg/dL    Sodium 134 (L) 136 - 145 mmol/L    Potassium 4.4 3.5 - 5.3 mmol/L    Chloride 102 98 - 107 mmol/L    Bicarbonate 26 21 - 32 mmol/L    Anion Gap 10 10 - 20 mmol/L    Urea Nitrogen 21 6 - 23 mg/dL    Creatinine 0.76 0.50 - 1.30 mg/dL    eGFR >90 >60 mL/min/1.73m*2    Calcium 8.3 (L) 8.6 - 10.6 mg/dL    Phosphorus 2.6 2.5 - 4.9 mg/dL    Albumin 3.7 3.4 - 5.0 g/dL   POCT GLUCOSE   Result Value Ref Range    POCT Glucose 120 (H) 74 - 99 mg/dL   POCT GLUCOSE   Result Value Ref Range    POCT Glucose 129 (H) 74 - 99 mg/dL       IMAGING/ DIAGNOSTIC TESTING:  I have personally reviewed the following test result(s):      XR chest 1 view 03/09/2025 (Wet Read, post chest tube removal)  Impression  1.  No significant interval change and bibasilar consolidations and  small pleural effusions.  2. Postsurgical changes as detailed above.    IMPRESSION & PLAN:  POD # 2 s/p Ascending aortic repair, median sternotomy, central cannulation, Michael sternal plates x 3   - Increase activity/ ambulation; PT/OT  - Encourage IS, C/DB; respiratory therapy; wean O2 as artie   - Cardiac rehab referral   - Continue cardiac meds: ASA, BB, statin   - Pain and anticonstipation meds  - 3/9 Removed mediastinal chest tubes   - 2v CXR, will order closer to discharge  - Postop echo not indicated   - Cut epicardial wires prior to discharge   - Tele until discharge  - Optimize nutrition and electrolytes    Rhythm  - Tele: 0248 AF -> SR 70's at approximately 10 AM   - 3/9 Cont amiodarone drip x 24 hours -> then transition to amio 400 mg bid x 5 days to complete a 5g load then start amio 200 every day for malignance   - 3/9 Start metoprolol 12.5 mg bid   - Continue BB  - Adjust medications as tolerated    Acute Blood Loss  Anemia - stable  Recent Labs     25  0809 25  0020 25  1247 25  0841 25  0705 24  1032 24  0651   HGB 13.1* 13.5 13.8 15.1 15.7 15.4 14.4   HCT 40.5* 38.4* 38.4* 45.2 46.7 46.7 43.6   - MV, PO Iron x1mo  - Daily labs, transfuse as indicated    Thrombocytopenia  Recent Labs     25  0809 25  0020 25  1247 25  0841 25  0705 24  1032 24  0651    224 191 352 332 351 294   - Etiology likely postop/CPB related  - Continue to trend with daily CBCs    Volume/Electrolyte Status: Preop wt Weight: 104 kg (229 lb 8 oz)   Vitals:    25 0503   Weight: 105 kg (230 lb 6.4 oz)     - Weight: Pre-op 104 kg, 3/9 105 kg  - Adjust diuresis as needed for postop cardiac surgery hypervolemia  - Replete electrolytes for hypokalemia/hypomagnesemia/hypophosphatemia as needed   - 3/9 Lasix 40 mg IV x1  - Daily weights and strict I&Os  - Daily RFP while admitted    Leukocytosis - trending up  Recent Labs     25  0809 25  0020 25  1247 25  0841 25  0705 24  1032 24  0651   WBC 15.8* 11.6* 14.3* 4.4 5.0 5.7 5.9     Temp (36hrs), Av.8 °C (98.2 °F), Min:36 °C (96.8 °F), Max:37.6 °C (99.7 °F)  - Likely reactive in nature   - aggressive pulmonary hygiene  - monitor for s/s infection  - likely atelectasis/ postoperative in etiology  - daily CBC to follow    Hypertension: home meds: amlodipine 5 mg every day, losartan 50 mg every day   Systolic (24hrs), Av , Min:94 , Max:124   - continue to hold home antihypertensive for now, will resume as clinically indicated   - 3/9 start metoprolol 12.5 mg bid   - additional antihypertensives as needed     Hx of Prostate cancer   - s/p prostatectomy and radiation therapy in     Hx of back pain  - Spondylosis without myelopathy    VTE Prophylaxis: SCDs/TEDs, ambulation, SQ heparin  Code Status: Full Code    Dispo  - PT/OT recs: 3/8 low intensity with walker   - Would  benefit from homecare RN for cardiac surgery carepath  - Anticipate discharge 3-5 days, pending post op testing (2V CXR, CTA chest), stable rhythm, optimal fluid status, clinical progressions    - Will continue to assess discharge needs      ONEIL Patton-CNP  Cardiac Surgery [facility]  [facility]  Team Phone [phone]

## 2025-03-09 NOTE — SIGNIFICANT EVENT
This morning CT placed to water seal. Repeat CXR 4 hrs post water seal did not show a PTX and no air leak.  2 mediastinal chest tube removed without difficulty. Patient tolerated well.    Stat 1V CXR ordered.    ONEIL Patton-CNP  Cardiac Surgery [facility]  [facility]  Team Pager [phone]

## 2025-03-09 NOTE — PROGRESS NOTES
Transitional Care Coordination     PCP: Garry Lopez     DATE OF LAST VISIT: 10/24    PHARMACY: [facility]     MEDICATIONS AFFORDABLE: Yes    FEELS SAFE AT HOME: Yes    RECENT FALLS: No    EQUIPMENT USED IN HOME: None    TRANSPORT HOME: Patient's wife     TCC introduced self and role. Patient lives at home with his wife. Prior to admission patient was independent with ADLs and still working. Patient is agreeable to [facility] if needed.      03/09/25 1250   Discharge Planning   Living Arrangements Spouse/significant other   Support Systems Spouse/significant other   Assistance Needed none   Type of Residence Private residence   Expected Discharge Disposition Home   Financial Resource Strain   How hard is it for you to pay for the very basics like food, housing, medical care, and heating? Not hard   Housing Stability   In the last 12 months, was there a time when you were not able to pay the mortgage or rent on time? N   In the past 12 months, how many times have you moved where you were living? 0   At any time in the past 12 months, were you homeless or living in a shelter (including now)? N   Transportation Needs   In the past 12 months, has lack of transportation kept you from medical appointments or from getting medications? no   In the past 12 months, has lack of transportation kept you from meetings, work, or from getting things needed for daily living? No     Melissa Raya RN, BSN CM   PRN Care Coordinator

## 2025-03-09 NOTE — SIGNIFICANT EVENT
Paged @2:48AM about HR 150s. In summary this is 64 yoM w/ PMHx HTN, prostate CA w/ spinal mets, ascending aortic aneurysm who is s/p ascending aortic repair with Dr. Flroes 3/7. Pt remained asymptomatic and vitally stable otherwise.  ECG obtained showed Afib/RVR. Pt received 2 doses of 5mg IV metoprolol with minimal response (HR 140s).   Will proceed with amiodarone bolus and gtt.

## 2025-03-10 ENCOUNTER — APPOINTMENT (OUTPATIENT)
Dept: RADIOLOGY | Facility: HOSPITAL | Age: 65
DRG: 220 | End: 2025-03-10
Payer: COMMERCIAL

## 2025-03-10 LAB
ALBUMIN SERPL BCP-MCNC: 3.5 G/DL (ref 3.4–5)
ANION GAP SERPL CALC-SCNC: 13 MMOL/L (ref 10–20)
BLOOD EXPIRATION DATE: NORMAL
BUN SERPL-MCNC: 20 MG/DL (ref 6–23)
CALCIUM SERPL-MCNC: 8.3 MG/DL (ref 8.6–10.6)
CHLORIDE SERPL-SCNC: 99 MMOL/L (ref 98–107)
CO2 SERPL-SCNC: 28 MMOL/L (ref 21–32)
CREAT SERPL-MCNC: 0.83 MG/DL (ref 0.5–1.3)
DISPENSE STATUS: NORMAL
EGFRCR SERPLBLD CKD-EPI 2021: >90 ML/MIN/1.73M*2
ERYTHROCYTE [DISTWIDTH] IN BLOOD BY AUTOMATED COUNT: 12.8 % (ref 11.5–14.5)
GLUCOSE BLD MANUAL STRIP-MCNC: 109 MG/DL (ref 74–99)
GLUCOSE BLD MANUAL STRIP-MCNC: 111 MG/DL (ref 74–99)
GLUCOSE BLD MANUAL STRIP-MCNC: 111 MG/DL (ref 74–99)
GLUCOSE BLD MANUAL STRIP-MCNC: 99 MG/DL (ref 74–99)
GLUCOSE SERPL-MCNC: 126 MG/DL (ref 74–99)
HCT VFR BLD AUTO: 36.1 % (ref 41–52)
HGB BLD-MCNC: 12.2 G/DL (ref 13.5–17.5)
MAGNESIUM SERPL-MCNC: 2.24 MG/DL (ref 1.6–2.4)
MCH RBC QN AUTO: 30.5 PG (ref 26–34)
MCHC RBC AUTO-ENTMCNC: 33.8 G/DL (ref 32–36)
MCV RBC AUTO: 90 FL (ref 80–100)
NRBC BLD-RTO: 0 /100 WBCS (ref 0–0)
PHOSPHATE SERPL-MCNC: 2.3 MG/DL (ref 2.5–4.9)
PLATELET # BLD AUTO: 185 X10*3/UL (ref 150–450)
POTASSIUM SERPL-SCNC: 3.8 MMOL/L (ref 3.5–5.3)
PRODUCT BLOOD TYPE: 6200
PRODUCT CODE: NORMAL
RBC # BLD AUTO: 4 X10*6/UL (ref 4.5–5.9)
SODIUM SERPL-SCNC: 136 MMOL/L (ref 136–145)
UNIT ABO: NORMAL
UNIT NUMBER: NORMAL
UNIT RH: NORMAL
UNIT VOLUME: 350
WBC # BLD AUTO: 10.7 X10*3/UL (ref 4.4–11.3)
XM INTEP: NORMAL

## 2025-03-10 PROCEDURE — 83735 ASSAY OF MAGNESIUM: CPT | Performed by: NURSE PRACTITIONER

## 2025-03-10 PROCEDURE — 1200000002 HC GENERAL ROOM WITH TELEMETRY DAILY

## 2025-03-10 PROCEDURE — 80069 RENAL FUNCTION PANEL: CPT | Performed by: NURSE PRACTITIONER

## 2025-03-10 PROCEDURE — 71045 X-RAY EXAM CHEST 1 VIEW: CPT

## 2025-03-10 PROCEDURE — 85027 COMPLETE CBC AUTOMATED: CPT | Performed by: NURSE PRACTITIONER

## 2025-03-10 PROCEDURE — 2500000005 HC RX 250 GENERAL PHARMACY W/O HCPCS: Performed by: NURSE PRACTITIONER

## 2025-03-10 PROCEDURE — 36415 COLL VENOUS BLD VENIPUNCTURE: CPT | Performed by: NURSE PRACTITIONER

## 2025-03-10 PROCEDURE — 2500000004 HC RX 250 GENERAL PHARMACY W/ HCPCS (ALT 636 FOR OP/ED)

## 2025-03-10 PROCEDURE — 2500000002 HC RX 250 W HCPCS SELF ADMINISTERED DRUGS (ALT 637 FOR MEDICARE OP, ALT 636 FOR OP/ED)

## 2025-03-10 PROCEDURE — 2500000001 HC RX 250 WO HCPCS SELF ADMINISTERED DRUGS (ALT 637 FOR MEDICARE OP)

## 2025-03-10 PROCEDURE — 99232 SBSQ HOSP IP/OBS MODERATE 35: CPT

## 2025-03-10 PROCEDURE — 2500000001 HC RX 250 WO HCPCS SELF ADMINISTERED DRUGS (ALT 637 FOR MEDICARE OP): Performed by: NURSE PRACTITIONER

## 2025-03-10 PROCEDURE — 82947 ASSAY GLUCOSE BLOOD QUANT: CPT

## 2025-03-10 PROCEDURE — 71045 X-RAY EXAM CHEST 1 VIEW: CPT | Performed by: RADIOLOGY

## 2025-03-10 RX ORDER — POTASSIUM CHLORIDE 20 MEQ/1
20 TABLET, EXTENDED RELEASE ORAL ONCE
Status: COMPLETED | OUTPATIENT
Start: 2025-03-10 | End: 2025-03-10

## 2025-03-10 RX ORDER — FUROSEMIDE 10 MG/ML
40 INJECTION INTRAMUSCULAR; INTRAVENOUS ONCE
Status: COMPLETED | OUTPATIENT
Start: 2025-03-10 | End: 2025-03-10

## 2025-03-10 RX ORDER — AMIODARONE HYDROCHLORIDE 200 MG/1
200 TABLET ORAL DAILY
Status: DISCONTINUED | OUTPATIENT
Start: 2025-03-17 | End: 2025-03-13 | Stop reason: HOSPADM

## 2025-03-10 RX ORDER — AMIODARONE HYDROCHLORIDE 200 MG/1
400 TABLET ORAL 2 TIMES DAILY
Status: DISCONTINUED | OUTPATIENT
Start: 2025-03-10 | End: 2025-03-13 | Stop reason: HOSPADM

## 2025-03-10 RX ADMIN — POLYSACCHARIDE-IRON COMPLEX 150 MG: 150 CAPSULE ORAL at 08:16

## 2025-03-10 RX ADMIN — ACETAMINOPHEN 650 MG: 325 TABLET ORAL at 00:31

## 2025-03-10 RX ADMIN — METOPROLOL TARTRATE 12.5 MG: 25 TABLET, FILM COATED ORAL at 08:16

## 2025-03-10 RX ADMIN — FUROSEMIDE 40 MG: 10 INJECTION INTRAMUSCULAR; INTRAVENOUS at 16:02

## 2025-03-10 RX ADMIN — Medication 1 TABLET: at 08:16

## 2025-03-10 RX ADMIN — ACETAMINOPHEN 650 MG: 325 TABLET ORAL at 11:25

## 2025-03-10 RX ADMIN — ACETAMINOPHEN 650 MG: 325 TABLET ORAL at 05:53

## 2025-03-10 RX ADMIN — ATORVASTATIN CALCIUM 40 MG: 40 TABLET, FILM COATED ORAL at 20:24

## 2025-03-10 RX ADMIN — Medication 2 L/MIN: at 08:17

## 2025-03-10 RX ADMIN — LIDOCAINE 1 PATCH: 4 PATCH TOPICAL at 08:16

## 2025-03-10 RX ADMIN — METOPROLOL TARTRATE 12.5 MG: 25 TABLET, FILM COATED ORAL at 20:24

## 2025-03-10 RX ADMIN — AMIODARONE HYDROCHLORIDE 400 MG: 200 TABLET ORAL at 20:24

## 2025-03-10 RX ADMIN — POTASSIUM CHLORIDE 20 MEQ: 1500 TABLET, EXTENDED RELEASE ORAL at 11:53

## 2025-03-10 RX ADMIN — AMIODARONE HYDROCHLORIDE 0.5 MG/MIN: 1.8 INJECTION, SOLUTION INTRAVENOUS at 11:25

## 2025-03-10 RX ADMIN — ASPIRIN 81 MG: 81 TABLET, COATED ORAL at 08:16

## 2025-03-10 ASSESSMENT — COGNITIVE AND FUNCTIONAL STATUS - GENERAL
DAILY ACTIVITIY SCORE: 24
DAILY ACTIVITIY SCORE: 24
MOBILITY SCORE: 24
MOBILITY SCORE: 24

## 2025-03-10 ASSESSMENT — PAIN - FUNCTIONAL ASSESSMENT
PAIN_FUNCTIONAL_ASSESSMENT: 0-10

## 2025-03-10 ASSESSMENT — PAIN SCALES - GENERAL
PAINLEVEL_OUTOF10: 1
PAINLEVEL_OUTOF10: 0 - NO PAIN

## 2025-03-10 NOTE — PROGRESS NOTES
"CARDIAC SURGERY DAILY PROGRESS NOTE    Arvind \"Geremias\" Rick is a 64 yoM w/ PMHx HTN, prostate CA w/ spinal mets, ascending aortic aneurysm who presents to the CTICU s/p ascending aortic repair with Dr. Flores 3/7.    3/7/2025 OPERATION with Daysi Flores  1. Median Sternotomy  2. Central Cannulation  3. ASCENDING AORTA REPAIR WITH 30 MM GELWEAVE GRAFT  4. [place] Sternal Plates x three    Echo Pre/Post: Normal BiV, mild MR, mild [place]  Chest Tubes/Drains: 2x meds   Temporary wires location/setting: VVI @ 50     CTICU course: uneventful    Transferred to the floor 3/8    Interval History:   3/9 0248 AF RVR -> metoprolol 5 mg IV x2 with minimal response with subsequent administration of amiodarone bolus and drip -> SR at around 10AM today.   3/10 No acute events reported overnight    SUBJECTIVE:  Patient seen and examined. Tele shows SR 70's. On RA. Denies any complaints.      Objective   /68 (BP Location: Right arm, Patient Position: Lying)   Pulse 69   Temp 36.7 °C (98.1 °F) (Temporal)   Resp 18   Ht 1.829 m (6')   Wt 103 kg (227 lb 8 oz)   SpO2 94%   BMI 30.85 kg/m²   0-10 (Numeric) Pain Score: 0 - No pain   3 Day Weight Change: -0.302 kg (-10.7 oz) per day    Intake and Output    Intake/Output Summary (Last 24 hours) at 3/10/2025 1539  Last data filed at 3/10/2025 1316  Gross per 24 hour   Intake 650.91 ml   Output 1000 ml   Net -349.09 ml       Physical Exam  Physical Exam  Vitals and nursing note reviewed.   HENT:      Head: Normocephalic and atraumatic.      Nose: Nose normal.      Mouth/Throat:      Mouth: Mucous membranes are moist.   Eyes:      Conjunctiva/sclera: Conjunctivae normal.   Cardiovascular:      Rate and Rhythm: Normal rate and regular rhythm.      Pulses: Normal pulses.      Heart sounds: Normal heart sounds.      Comments: Tele: SR 70's  V wires connected to temp pacer set to VVI 50  Pulmonary:      Effort: Pulmonary effort is normal.      Comments: 2L to RA   Posterior " diminished lungs sounds at bases bilateral   Abdominal:      General: Bowel sounds are normal.      Palpations: Abdomen is soft.   Musculoskeletal:      Cervical back: Neck supple.      Comments: STORY   Skin:     General: Skin is warm and dry.      Capillary Refill: Capillary refill takes less than 2 seconds.      Comments: Mid sternum incision well approximated, no erythema, no drainage.    Neurological:      General: No focal deficit present.      Mental Status: He is alert and oriented to person, place, and time. Mental status is at baseline.   Psychiatric:         Mood and Affect: Mood normal.         Behavior: Behavior normal.         Medications  Scheduled medications  acetaminophen, 650 mg, oral, q6h  [START ON 3/17/2025] amiodarone, 200 mg, oral, Daily  amiodarone, 400 mg, oral, BID  aspirin, 81 mg, oral, Daily  atorvastatin, 40 mg, oral, Nightly  insulin lispro, 0-10 Units, subcutaneous, TID AC  iron polysaccharides, 150 mg, oral, Daily  lidocaine, 1 patch, transdermal, Daily  metoprolol tartrate, 12.5 mg, oral, BID  multivitamin with minerals, 1 tablet, oral, Daily  oxygen, , inhalation, Continuous - 02/gases  polyethylene glycol, 17 g, oral, BID  sennosides-docusate sodium, 2 tablet, oral, BID    Continuous medications     PRN medications  PRN medications: bisacodyl, dextrose **OR** glucagon, hydrALAZINE, HYDROmorphone, naloxone, ondansetron **OR** ondansetron, oxyCODONE, oxyCODONE    Labs  Results for orders placed or performed during the hospital encounter of 03/07/25 (from the past 24 hours)   POCT GLUCOSE   Result Value Ref Range    POCT Glucose 129 (H) 74 - 99 mg/dL   POCT GLUCOSE   Result Value Ref Range    POCT Glucose 107 (H) 74 - 99 mg/dL   POCT GLUCOSE   Result Value Ref Range    POCT Glucose 111 (H) 74 - 99 mg/dL   Magnesium   Result Value Ref Range    Magnesium 2.24 1.60 - 2.40 mg/dL   CBC   Result Value Ref Range    WBC 10.7 4.4 - 11.3 x10*3/uL    nRBC 0.0 0.0 - 0.0 /100 WBCs    RBC 4.00 (L)  4.50 - 5.90 x10*6/uL    Hemoglobin 12.2 (L) 13.5 - 17.5 g/dL    Hematocrit 36.1 (L) 41.0 - 52.0 %    MCV 90 80 - 100 fL    MCH 30.5 26.0 - 34.0 pg    MCHC 33.8 32.0 - 36.0 g/dL    RDW 12.8 11.5 - 14.5 %    Platelets 185 150 - 450 x10*3/uL   Renal Function Panel   Result Value Ref Range    Glucose 126 (H) 74 - 99 mg/dL    Sodium 136 136 - 145 mmol/L    Potassium 3.8 3.5 - 5.3 mmol/L    Chloride 99 98 - 107 mmol/L    Bicarbonate 28 21 - 32 mmol/L    Anion Gap 13 10 - 20 mmol/L    Urea Nitrogen 20 6 - 23 mg/dL    Creatinine 0.83 0.50 - 1.30 mg/dL    eGFR >90 >60 mL/min/1.73m*2    Calcium 8.3 (L) 8.6 - 10.6 mg/dL    Phosphorus 2.3 (L) 2.5 - 4.9 mg/dL    Albumin 3.5 3.4 - 5.0 g/dL   POCT GLUCOSE   Result Value Ref Range    POCT Glucose 109 (H) 74 - 99 mg/dL     *Note: Due to a large number of results and/or encounters for the requested time period, some results have not been displayed. A complete set of results can be found in Results Review.       IMAGING/ DIAGNOSTIC TESTING:  I have personally reviewed the following test result(s):      XR chest 1 view 03/10/2025  Impression  1. No evidence of pneumothorax development following chest tube  removal.  2. Bronchovascular crowding and bibasilar atelectasis likely postop  in nature. Stable small bilateral pleural effusions.    XR chest 1 view 03/09/2025 (Wet Read, post chest tube removal)  Impression  1.  No significant interval change and bibasilar consolidations and  small pleural effusions.  2. Postsurgical changes as detailed above.    IMPRESSION & PLAN:  POD # 3 s/p Ascending aortic repair, median sternotomy, central cannulation, Michael sternal plates x 3   - Increase activity/ ambulation; PT/OT  - Encourage IS, C/DB; respiratory therapy; wean O2 as artie   - Cardiac rehab referral   - Continue cardiac meds: ASA, BB, statin   - Pain and anticonstipation meds  - 3/9 Removed mediastinal chest tubes   - 2v CXR ordered for 3/10  - Will need to check with Dr. Flores if a CTA  chest is needed  - Postop echo not indicated   - Cut epicardial wires prior to discharge   - Tele until discharge  - Optimize nutrition and electrolytes    Rhythm  - 3/9 0248 AF -> SR 70's at approximately 10 AM   - 3/9 Cont amiodarone drip x 24 hours -> then transition to amio 400 mg bid x 6 days to complete a 5g load then start amio 200 every day for maintainance   - 3/9 Start metoprolol 12.5 mg bid   - 3/10 Will transition IV amiodarone to amiodarone 400 mg bid x 6 days for a total of a 5g load starting tonight, then on 3/17 start amiodarone 200 mg every day for maintenance   - Continue BB  - Adjust medications as tolerated    Acute Blood Loss Anemia - stable  Recent Labs     03/10/25  0814 25  0809 25  0020 25  1247 25  0841 25  0705 24  1032   HGB 12.2* 13.1* 13.5 13.8 15.1 15.7 15.4   HCT 36.1* 40.5* 38.4* 38.4* 45.2 46.7 46.7   - MV, PO Iron x1mo  - Daily labs, transfuse as indicated    Thrombocytopenia  Recent Labs     03/10/25  0814 25  0809 25  0020 25  1247 25  0841 25  0705 24  1032    185 224 191 352 332 351   - Etiology likely postop/CPB related  - Continue to trend with daily CBCs    Volume/Electrolyte Status: Preop wt Weight: 104 kg (229 lb 8 oz)   Vitals:    03/10/25 0552   Weight: 103 kg (227 lb 8 oz)     - Weight: Pre-op 104 kg, 3/10 103 kg  - Adjust diuresis as needed for postop cardiac surgery hypervolemia  - Replete electrolytes for hypokalemia/hypomagnesemia/hypophosphatemia as needed; 3/10 replaced k  - 3/9 Lasix 40 mg IV x1  - 3/10 Lasix Lasix 40 mg IV x1  - Daily weights and strict I&Os  - Daily RFP while admitted    Leukocytosis - Resolved  Recent Labs     03/10/25  0814 25  0809 25  0020 25  1247 25  0841 25  0705 24  1032   WBC 10.7 15.8* 11.6* 14.3* 4.4 5.0 5.7     Temp (36hrs), Av.7 °C (98.1 °F), Min:36.3 °C (97.3 °F), Max:37.2 °C (99 °F)  - Likely reactive in nature    - aggressive pulmonary hygiene  - monitor for s/s infection  - likely atelectasis/ postoperative in etiology  - daily CBC to follow    Hypertension: home meds: amlodipine 5 mg every day, losartan 50 mg every day   Systolic (24hrs), Av , Min:99 , Max:124   - continue to hold home antihypertensive for now, will resume as clinically indicated   - 3/9 start metoprolol 12.5 mg bid   - additional antihypertensives as needed     Hx of Prostate cancer   - s/p prostatectomy and radiation therapy in     Hx of back pain  - Spondylosis without myelopathy    VTE Prophylaxis: SCDs/TEDs, ambulation, SQ heparin  Code Status: Full Code    Dispo  - PT/OT recs: 3/8 low intensity with walker   - Would benefit from homecare RN for cardiac surgery carepath  - Anticipate discharge 2-4 days, pending post op testing (2V CXR, CTA chest), stable rhythm, optimal fluid status, clinical progressions    - Will continue to assess discharge needs      ONEIL Patton-CNP  Cardiac Surgery [facility]  [facility]  Team Phone [phone]

## 2025-03-10 NOTE — RESEARCH NOTES
-TITAN Adverse Event-    SUBJECT NUMBER :       DATE OF EVENT: (DD/MM/YYYY)  07 MAR-2025     DATE STUDY STAFF AWARE OF EVENT: (DD/MM/YYYY)  10 MAR 2025     [] Death from Cardiovascular Cause  [] Acute aortic event  [] Aneurysm-related death  [] Elective ascending aortic aneurysm repair   [] Vicky-Operative Myocardial Infarction  [] Non-perioperative Myocardial Infarction   [] Cerebrovascular Accident  [] Re-Opening for bleeding   [x] Other    EVENT:   hypertension     DESCRIPTION OF EVENT:  andres developed high BP after surgery that required nItroglycerine drip , than weaned off on MARCH 7, 2025     INTERVENTIONS/TREATMENTS:  NITROGLYCERINE DRIP     EVENT OUTCOME:  [x] Resolved []Resolved with sequelea []Death []Ongoing    If Resolved, Date Resolved:  7 MAR 2025     IS/WAS THE EVENT SERIOUS? []No [x]Yes    IS/WAS THE EVENT UNEXPECTED? [x]No []Yes    RELATIONSHIP TO STUDY INTERVENTION  [] Unlikely Related - no temporal association or the cause of the event has been identified, or the procedure cannot be implicated  [] Possibly Related - temporal association, but other etiologies are likely to be the cause, however involvement of the procedure cannot be excluded  [] Probably Related - temporal association and other etiologies are possible but unlikely  [x] Related - definite association to procedure is certain

## 2025-03-10 NOTE — CARE PLAN
The patient's goals for the shift include  achieving adequate rest.     The clinical goals for the shift include  patient will remain HDS throughout shift.     Problem: Fall/Injury  Goal: Not fall by end of shift  Outcome: Progressing  Goal: Be free from injury by end of the shift  Outcome: Progressing  Goal: Verbalize understanding of personal risk factors for fall in the hospital  Outcome: Progressing  Goal: Verbalize understanding of risk factor reduction measures to prevent injury from fall in the home  Outcome: Progressing  Goal: Use assistive devices by end of the shift  Outcome: Progressing  Goal: Pace activities to prevent fatigue by end of the shift  Outcome: Progressing     Problem: Safety - Adult  Goal: Free from fall injury  Outcome: Progressing     Problem: Discharge Planning  Goal: Discharge to home or other facility with appropriate resources  Outcome: Progressing     Problem: Chronic Conditions and Co-morbidities  Goal: Patient's chronic conditions and co-morbidity symptoms are monitored and maintained or improved  Outcome: Progressing     Problem: Nutrition  Goal: Nutrient intake appropriate for maintaining nutritional needs  Outcome: Progressing     Problem: Pain  Goal: Takes deep breaths with improved pain control throughout the shift  Outcome: Progressing  Goal: Turns in bed with improved pain control throughout the shift  Outcome: Progressing  Goal: Walks with improved pain control throughout the shift  Outcome: Progressing  Goal: Performs ADL's with improved pain control throughout shift  Outcome: Progressing     Problem: Respiratory  Goal: Clear secretions with interventions this shift  Outcome: Progressing  Goal: Minimal/no exertional discomfort or dyspnea this shift  Outcome: Progressing  Goal: No signs of respiratory distress (eg. Use of accessory muscles. Peds grunting)  Outcome: Progressing

## 2025-03-11 ENCOUNTER — APPOINTMENT (OUTPATIENT)
Dept: RADIOLOGY | Facility: HOSPITAL | Age: 65
DRG: 220 | End: 2025-03-11
Payer: COMMERCIAL

## 2025-03-11 LAB
ALBUMIN SERPL BCP-MCNC: 3.6 G/DL (ref 3.4–5)
ANION GAP SERPL CALC-SCNC: 12 MMOL/L (ref 10–20)
ATRIAL RATE: 68 BPM
BUN SERPL-MCNC: 18 MG/DL (ref 6–23)
CALCIUM SERPL-MCNC: 8.4 MG/DL (ref 8.6–10.6)
CHLORIDE SERPL-SCNC: 100 MMOL/L (ref 98–107)
CO2 SERPL-SCNC: 29 MMOL/L (ref 21–32)
CREAT SERPL-MCNC: 0.84 MG/DL (ref 0.5–1.3)
EGFRCR SERPLBLD CKD-EPI 2021: >90 ML/MIN/1.73M*2
ERYTHROCYTE [DISTWIDTH] IN BLOOD BY AUTOMATED COUNT: 12.7 % (ref 11.5–14.5)
GLUCOSE BLD MANUAL STRIP-MCNC: 106 MG/DL (ref 74–99)
GLUCOSE BLD MANUAL STRIP-MCNC: 93 MG/DL (ref 74–99)
GLUCOSE BLD MANUAL STRIP-MCNC: 98 MG/DL (ref 74–99)
GLUCOSE SERPL-MCNC: 94 MG/DL (ref 74–99)
HCT VFR BLD AUTO: 37.5 % (ref 41–52)
HGB BLD-MCNC: 12.3 G/DL (ref 13.5–17.5)
MAGNESIUM SERPL-MCNC: 2.16 MG/DL (ref 1.6–2.4)
MCH RBC QN AUTO: 30.1 PG (ref 26–34)
MCHC RBC AUTO-ENTMCNC: 32.8 G/DL (ref 32–36)
MCV RBC AUTO: 92 FL (ref 80–100)
NRBC BLD-RTO: 0 /100 WBCS (ref 0–0)
P AXIS: 79 DEGREES
P OFFSET: 211 MS
P ONSET: 148 MS
PHOSPHATE SERPL-MCNC: 3 MG/DL (ref 2.5–4.9)
PLATELET # BLD AUTO: 237 X10*3/UL (ref 150–450)
POTASSIUM SERPL-SCNC: 3.5 MMOL/L (ref 3.5–5.3)
PR INTERVAL: 156 MS
Q ONSET: 226 MS
QRS COUNT: 11 BEATS
QRS DURATION: 86 MS
QT INTERVAL: 438 MS
QTC CALCULATION(BAZETT): 465 MS
QTC FREDERICIA: 456 MS
R AXIS: -12 DEGREES
RBC # BLD AUTO: 4.09 X10*6/UL (ref 4.5–5.9)
SODIUM SERPL-SCNC: 137 MMOL/L (ref 136–145)
T AXIS: 36 DEGREES
T OFFSET: 445 MS
VENTRICULAR RATE: 68 BPM
WBC # BLD AUTO: 10.4 X10*3/UL (ref 4.4–11.3)

## 2025-03-11 PROCEDURE — 2500000001 HC RX 250 WO HCPCS SELF ADMINISTERED DRUGS (ALT 637 FOR MEDICARE OP): Performed by: NURSE PRACTITIONER

## 2025-03-11 PROCEDURE — 2500000005 HC RX 250 GENERAL PHARMACY W/O HCPCS: Performed by: NURSE PRACTITIONER

## 2025-03-11 PROCEDURE — 71046 X-RAY EXAM CHEST 2 VIEWS: CPT | Performed by: RADIOLOGY

## 2025-03-11 PROCEDURE — 85027 COMPLETE CBC AUTOMATED: CPT | Performed by: NURSE PRACTITIONER

## 2025-03-11 PROCEDURE — 1200000002 HC GENERAL ROOM WITH TELEMETRY DAILY

## 2025-03-11 PROCEDURE — 97165 OT EVAL LOW COMPLEX 30 MIN: CPT | Mod: GO

## 2025-03-11 PROCEDURE — 2500000002 HC RX 250 W HCPCS SELF ADMINISTERED DRUGS (ALT 637 FOR MEDICARE OP, ALT 636 FOR OP/ED)

## 2025-03-11 PROCEDURE — 83735 ASSAY OF MAGNESIUM: CPT | Performed by: NURSE PRACTITIONER

## 2025-03-11 PROCEDURE — 2500000004 HC RX 250 GENERAL PHARMACY W/ HCPCS (ALT 636 FOR OP/ED)

## 2025-03-11 PROCEDURE — 97530 THERAPEUTIC ACTIVITIES: CPT | Mod: GO

## 2025-03-11 PROCEDURE — 82947 ASSAY GLUCOSE BLOOD QUANT: CPT

## 2025-03-11 PROCEDURE — 99232 SBSQ HOSP IP/OBS MODERATE 35: CPT

## 2025-03-11 PROCEDURE — 80069 RENAL FUNCTION PANEL: CPT | Performed by: NURSE PRACTITIONER

## 2025-03-11 PROCEDURE — 2500000001 HC RX 250 WO HCPCS SELF ADMINISTERED DRUGS (ALT 637 FOR MEDICARE OP)

## 2025-03-11 PROCEDURE — 94640 AIRWAY INHALATION TREATMENT: CPT

## 2025-03-11 PROCEDURE — 2500000004 HC RX 250 GENERAL PHARMACY W/ HCPCS (ALT 636 FOR OP/ED): Performed by: NURSE PRACTITIONER

## 2025-03-11 PROCEDURE — 71046 X-RAY EXAM CHEST 2 VIEWS: CPT

## 2025-03-11 PROCEDURE — 36415 COLL VENOUS BLD VENIPUNCTURE: CPT | Performed by: NURSE PRACTITIONER

## 2025-03-11 RX ORDER — METOPROLOL TARTRATE 25 MG/1
25 TABLET, FILM COATED ORAL 2 TIMES DAILY
Status: DISCONTINUED | OUTPATIENT
Start: 2025-03-11 | End: 2025-03-12

## 2025-03-11 RX ORDER — FUROSEMIDE 10 MG/ML
20 INJECTION INTRAMUSCULAR; INTRAVENOUS ONCE
Status: COMPLETED | OUTPATIENT
Start: 2025-03-11 | End: 2025-03-11

## 2025-03-11 RX ORDER — POTASSIUM CHLORIDE 20 MEQ/1
40 TABLET, EXTENDED RELEASE ORAL ONCE
Status: COMPLETED | OUTPATIENT
Start: 2025-03-11 | End: 2025-03-11

## 2025-03-11 RX ADMIN — ASPIRIN 81 MG: 81 TABLET, COATED ORAL at 10:00

## 2025-03-11 RX ADMIN — ATORVASTATIN CALCIUM 40 MG: 40 TABLET, FILM COATED ORAL at 20:27

## 2025-03-11 RX ADMIN — POLYETHYLENE GLYCOL 3350 17 G: 17 POWDER, FOR SOLUTION ORAL at 20:28

## 2025-03-11 RX ADMIN — AMIODARONE HYDROCHLORIDE 400 MG: 200 TABLET ORAL at 20:27

## 2025-03-11 RX ADMIN — ACETAMINOPHEN 650 MG: 325 TABLET ORAL at 20:27

## 2025-03-11 RX ADMIN — Medication 0.5 L/MIN: at 08:00

## 2025-03-11 RX ADMIN — ACETAMINOPHEN 650 MG: 325 TABLET ORAL at 09:59

## 2025-03-11 RX ADMIN — ACETAMINOPHEN 650 MG: 325 TABLET ORAL at 14:41

## 2025-03-11 RX ADMIN — POLYSACCHARIDE-IRON COMPLEX 150 MG: 150 CAPSULE ORAL at 10:00

## 2025-03-11 RX ADMIN — LIDOCAINE 1 PATCH: 4 PATCH TOPICAL at 10:00

## 2025-03-11 RX ADMIN — ACETAMINOPHEN 650 MG: 325 TABLET ORAL at 01:49

## 2025-03-11 RX ADMIN — METOPROLOL TARTRATE 12.5 MG: 25 TABLET, FILM COATED ORAL at 10:00

## 2025-03-11 RX ADMIN — FUROSEMIDE 20 MG: 10 INJECTION, SOLUTION INTRAVENOUS at 12:06

## 2025-03-11 RX ADMIN — Medication 1 TABLET: at 10:00

## 2025-03-11 RX ADMIN — AMIODARONE HYDROCHLORIDE 400 MG: 200 TABLET ORAL at 10:00

## 2025-03-11 RX ADMIN — METOPROLOL TARTRATE 25 MG: 25 TABLET, FILM COATED ORAL at 20:27

## 2025-03-11 RX ADMIN — POTASSIUM CHLORIDE 40 MEQ: 1500 TABLET, EXTENDED RELEASE ORAL at 12:06

## 2025-03-11 ASSESSMENT — COGNITIVE AND FUNCTIONAL STATUS - GENERAL
HELP NEEDED FOR BATHING: A LITTLE
MOBILITY SCORE: 24
DAILY ACTIVITIY SCORE: 20
DAILY ACTIVITIY SCORE: 24
DRESSING REGULAR UPPER BODY CLOTHING: A LITTLE
DRESSING REGULAR LOWER BODY CLOTHING: A LITTLE
TOILETING: A LITTLE

## 2025-03-11 ASSESSMENT — PAIN SCALES - GENERAL
PAINLEVEL_OUTOF10: 0 - NO PAIN
PAINLEVEL_OUTOF10: 0 - NO PAIN
PAINLEVEL_OUTOF10: 4
PAINLEVEL_OUTOF10: 0 - NO PAIN

## 2025-03-11 ASSESSMENT — PAIN - FUNCTIONAL ASSESSMENT
PAIN_FUNCTIONAL_ASSESSMENT: 0-10

## 2025-03-11 ASSESSMENT — PAIN DESCRIPTION - DESCRIPTORS: DESCRIPTORS: ACHING;SORE

## 2025-03-11 ASSESSMENT — ACTIVITIES OF DAILY LIVING (ADL): ADL_ASSISTANCE: INDEPENDENT

## 2025-03-11 NOTE — PROGRESS NOTES
"Occupational Therapy    Evaluation/Treatment    Patient Name: Arvind Cabrera \"Geremias\"  MRN: [id number]  Today's Date: 3/11/2025  Time Calculation  Start Time: 1047  Stop Time: 1110  Time Calculation (min): 23 min    Assessment  IP OT Assessment  OT Assessment: Anticipate min assist with lower body ADLs, pt required CGA with functional transfers and ambulation using a wheeled walker.  Prognosis: Good  Barriers to Discharge Home: No anticipated barriers  Evaluation/Treatment Tolerance: Patient tolerated treatment well  Medical Staff Made Aware: Yes  End of Session Communication: Bedside nurse  End of Session Patient Position: Up in chair, Alarm off, not on at start of session  Plan:  Treatment Interventions: ADL retraining, Functional transfer training, Endurance training  OT Frequency: 3 times per week  OT Discharge Recommendations: Low intensity level of continued care  OT Recommended Transfer Status:  (CGA)  OT - OK to Discharge: Yes    Subjective     Current Problem:  1. Aneurysm of ascending aorta without rupture (CMS-HCC)  Anesthesia Intraoperative Transesophageal Echocardiogram    Anesthesia Intraoperative Transesophageal Echocardiogram    Surgical Pathology Exam    Surgical Pathology Exam          General:  Reason for Referral: s/p ascending aortic repair with Dr. Flores 3/7.  Past Medical History Relevant to Rehab: PMHx HTN, prostate CA w/ spinal mets, ascending aortic aneurysm  Prior to Session Communication: Bedside nurse  Patient Position Received: Up in chair, Alarm off, not on at start of session  Family/Caregiver Present: Yes  Caregiver Feedback: pt's wife was present     Precautions:  Medical Precautions: Cardiac precautions, Fall precautions  Post-Surgical Precautions: Move in the Tube    Pain:  Pain Assessment  Pain Assessment: 0-10  0-10 (Numeric) Pain Score: 0 - No pain      Objective   Cognition:  Overall Cognitive Status: Within Functional Limits  Orientation Level: Oriented X4             Home " Living:  Type of Home: House  Lives With: Spouse  Home Adaptive Equipment: None  Home Layout: Two level, Bed/bath upstairs, Full bath main level  Home Access: Stairs to enter with rails  Bathroom Shower/Tub: Tub/shower unit  Bathroom Equipment: None     Prior Function:  Level of [place]: Independent with ADLs and functional transfers  Receives Help From: Family  ADL Assistance: Independent  Homemaking Assistance: Independent  Ambulatory Assistance: Independent  Vocational: Full time employment  Prior Function Comments: Drives, no falls    ADL:  Eating Assistance: Independent  Grooming Assistance: Independent  Grooming Deficit:  (anticipate)  LE Dressing Assistance: Minimal  LE Dressing Deficit: Thread RLE into pants, Thread LLE into pants (anticipate)    Activity Tolerance:  Endurance: Endurance does not limit participation in activity    Balance:  Dynamic Standing Balance  Dynamic Standing-Level of Assistance: Contact guard  Dynamic Standing-Balance:  (Pt completed functional ambulation with good dynamic standing balance using a wheeled walker.)    Bed Mobility/Transfers: Bed Mobility  Bed Mobility: No   and Transfers  Transfer: Yes  Transfer 1  Transfer From 1: Chair with arms to  Transfer to 1: Stand  Technique 1: Sit to stand, Stand to sit  Transfer Device 1: Walker  Transfer Level of Assistance 1: Contact guard, Minimal verbal cues    Vision:     and Vision - Complex Assessment  Ocular Range of Motion: Within Functional Limits    Sensation:  Light Touch: No apparent deficits      Perception:  Inattention/Neglect: Appears intact    Coordination:  Movements are Fluid and Coordinated: Yes     Hand Function:  Hand Function  Gross Grasp: Functional  Coordination: Functional    Extremities: RUE   RUE : Within Functional Limits, LUE   LUE: Within Functional Limits,  , and      Outcome Measures: [facility] Daily Activity  Putting on and taking off regular lower body clothing: A little  Bathing (including washing,  rinsing, drying): A little  Putting on and taking off regular upper body clothing: A little  Toileting, which includes using toilet, bedpan or urinal: A little  Taking care of personal grooming such as brushing teeth: None  Eating Meals: None  Daily Activity - Total Score: 20         ,          Education Documentation  Handouts, taught by Yaneth March OT at 3/11/2025  2:48 PM.  Learner: Patient  Readiness: Acceptance  Method: Explanation  Response: Verbalizes Understanding  Comment: MITT precautions, log roll, and exericises to complete per post op protocol.    Body Mechanics, taught by Yaneth March OT at 3/11/2025  2:48 PM.  Learner: Patient  Readiness: Acceptance  Method: Explanation  Response: Verbalizes Understanding  Comment: MITT precautions, log roll, and exericises to complete per post op protocol.    Precautions, taught by Yaneth March OT at 3/11/2025  2:48 PM.  Learner: Patient  Readiness: Acceptance  Method: Explanation  Response: Verbalizes Understanding  Comment: MITT precautions, log roll, and exericises to complete per post op protocol.    Home Exercise Program, taught by Yaneth March OT at 3/11/2025  2:48 PM.  Learner: Patient  Readiness: Acceptance  Method: Explanation  Response: Verbalizes Understanding  Comment: MITT precautions, log roll, and exericises to complete per post op protocol.    ADL Training, taught by Yaneth March OT at 3/11/2025  2:48 PM.  Learner: Patient  Readiness: Acceptance  Method: Explanation  Response: Verbalizes Understanding  Comment: MITT precautions, log roll, and exericises to complete per post op protocol.    Education Comments  No comments found.        Goals:   Encounter Problems       Encounter Problems (Active)       ADLs       Patient will perform UB and LB bathing  with modified independent level of assistance and grab bars. (Progressing)       Start:  03/11/25    Expected End:  04/01/25            Patient with complete lower body dressing with  modified independent level of assistance donning and doffing all LE clothes  with PRN adaptive equipment while edge of bed  (Progressing)       Start:  03/11/25    Expected End:  04/01/25            Patient will complete toileting including hygiene clothing management/hygiene with modified independent level of assistance and grab bars. (Progressing)       Start:  03/11/25    Expected End:  04/01/25               BALANCE       Pt will maintain dynamic standing balance during ADL task with modified independent level of assistance in order to demonstrate decreased risk of falling and improved postural control. (Progressing)       Start:  03/11/25    Expected End:  04/01/25               EXERCISE/STRENGTHENING       Patient will complete BUE exercises for 5 reps in order to improve strength and activity for ADL performance.  (Progressing)       Start:  03/11/25    Expected End:  04/01/25               MOBILITY       Patient will perform Functional mobility min Household distances/Community Distances with modified independent level of assistance and least restrictive device in order to improve safety and functional mobility. (Progressing)       Start:  03/11/25    Expected End:  04/01/25               TRANSFERS       Patient will perform bed mobility modified independent level of assistance and bed rails in order to improve safety and independence with mobility (Progressing)       Start:  03/11/25    Expected End:  04/01/25            Patient will complete sit to stand transfer with modified independent level of assistance and least restrictive device in order to improve safety and prepare for out of bed mobility. (Progressing)       Start:  03/11/25    Expected End:  04/01/25                   Treatment Completed on Evaluation    Therapy/Activity:      Pt completed functional ambulation with CGA using a wheeled walker, pt presented with good dynamic standing balance. Pt required one seated rest break while completing  the activity.           03/11/25 at 2:49 PM   Yaneth ANTONIO/L, OTD  Rehab Office: [phone]

## 2025-03-11 NOTE — SIGNIFICANT EVENT
Rapid Response Nurse Note: RADAR alert: 6    Pager time: 550  Arrival time: 551  Event end time: 555  Location: T3-72  [] Triage by phone or secure messaging    Rapid response initiated by:  [] Rapid response RN [] Family [] Nursing Supervisor [] Physician   [x] RADAR auto page [] Sepsis auto-page [] RN [] RT   [] NP/PA [] Other:     Primary reason for call:   [] BAT [] New CPAP/BiPAP [] Bleeding [] Change in mental status   [] Chest pain [] Code blue [] FiO2 >/= 50% [] HR </= 40 bpm   [] HR >/= 130 bpm [] Hyperglycemia [] Hypoglycemia [x] RADAR    [] RR </= 8 bpm [] RR >/= 30 bpm [] SBP </= 90 mmHg [] SpO2 < 90%   [] Seizure [] Sepsis [] Shortness of breath  [] Staff concern: see comments     Initial VS and/or RADAR VS: T 36.2 °C; HR 66; RR 17; /63; SPO2 93%.    Providers present at bedside (if applicable): bedside RN     Name of ICU Provider contacted (if applicable): NA    Interventions:  [x] None [] ABG/VBG [] Assist w/ICU transfer [] BAT paged    [] Bag mask [] Blood [] Cardioversion [] Code Blue   [] Code blue for intubation [] Code status changed [] Chest x-ray [] EKG   [] IV fluid/bolus [] KUB x-ray [] Labs/cultures [] Medication   [] Nebulizer treatment [] NIPPV (CPAP/BiPAP) [] Oxygen [] Oral airway   [] Peripheral IV [] Palliative care consult [] CT/MRI [] Sepsis protocol    [] Suctioned [] Other:     Outcome:  [] Coded and  [] Code blue for intubation [] Coded and transferred to ICU []  on division   [x] Remained on division (no change) [] Remained on division + additional monitoring [] Remained in ED [] Transferred to ED   [] Transferred to ICU [] Transferred to inpatient status [] Transferred for interventions (procedure) [] Transferred to ICU stepdown    [] Transferred to surgery [] Transferred to telemetry [] Sepsis protocol [] STEMI protocol   [] Stroke protocol [x] Bedside nurse instructed to page rapid response for any concerns or acute change in condition/VS     Additional  Comments: Radar=6 paged; spoke with bedside RN & reviewed chart; VS's @ pt's baseline & RN without acute concerns @ this time

## 2025-03-11 NOTE — PROGRESS NOTES
"CARDIAC SURGERY DAILY PROGRESS NOTE    Arvind \"Geremias\" Rick is a 64 yoM w/ PMHx HTN, prostate CA w/ spinal mets, ascending aortic aneurysm who presents to the CTICU s/p ascending aortic repair with Dr. Flores 3/7.    3/7/2025 OPERATION with Daysi Flores  1. Median Sternotomy  2. Central Cannulation  3. ASCENDING AORTA REPAIR WITH 30 MM GELWEAVE GRAFT  4. [place] Sternal Plates x three    Echo Pre/Post: Normal BiV, mild MR, mild WI  Chest Tubes/Drains: 2x meds   Temporary wires location/setting: VVI @ 50     CTICU course: uneventful    Transferred to the floor 3/8    Interval History:   3/9 0248 AF RVR -> metoprolol 5 mg IV x2 with minimal response with subsequent administration of amiodarone bolus and drip -> SR at around 10AM today.   3/10 No acute events reported overnight  3/11 No acute events reported    SUBJECTIVE:  Patient seen and examined today. Patient is currently SR 80-90, had one episode of -160's with subsequent conversion to SR without intervention. On RA. Patient denies any complaints.    Objective   /68 (BP Location: Right arm, Patient Position: Sitting)   Pulse 91   Temp 36.6 °C (97.9 °F) (Temporal)   Resp 18   Ht 1.829 m (6')   Wt 102 kg (224 lb 14.4 oz)   SpO2 97%   BMI 30.50 kg/m²   0-10 (Numeric) Pain Score: 0 - No pain   3 Day Weight Change: Unable to Calculate    Intake and Output    Intake/Output Summary (Last 24 hours) at 3/11/2025 1738  Last data filed at 3/11/2025 1603  Gross per 24 hour   Intake 360 ml   Output 2100 ml   Net -1740 ml       Physical Exam  Physical Exam  Vitals and nursing note reviewed.   HENT:      Head: Normocephalic and atraumatic.      Nose: Nose normal.      Mouth/Throat:      Mouth: Mucous membranes are moist.   Eyes:      Conjunctiva/sclera: Conjunctivae normal.   Cardiovascular:      Rate and Rhythm: Normal rate and regular rhythm.      Pulses: Normal pulses.      Heart sounds: Normal heart sounds.      Comments: Tele: SR 80-90's  V " wires connected to temp pacer set to VVI 50  Pulmonary:      Effort: Pulmonary effort is normal.      Comments: On RA   Posterior diminished lungs sounds at bases bilateral   Abdominal:      General: Bowel sounds are normal.      Palpations: Abdomen is soft.   Musculoskeletal:      Cervical back: Neck supple.      Comments: STORY   Skin:     General: Skin is warm and dry.      Capillary Refill: Capillary refill takes less than 2 seconds.      Comments: Mid sternum incision well approximated, no erythema, no drainage.    Neurological:      General: No focal deficit present.      Mental Status: He is alert and oriented to person, place, and time. Mental status is at baseline.   Psychiatric:         Mood and Affect: Mood normal.         Behavior: Behavior normal.         Medications  Scheduled medications  acetaminophen, 650 mg, oral, q6h  [START ON 3/17/2025] amiodarone, 200 mg, oral, Daily  amiodarone, 400 mg, oral, BID  aspirin, 81 mg, oral, Daily  atorvastatin, 40 mg, oral, Nightly  iron polysaccharides, 150 mg, oral, Daily  lidocaine, 1 patch, transdermal, Daily  metoprolol tartrate, 25 mg, oral, BID  multivitamin with minerals, 1 tablet, oral, Daily  oxygen, , inhalation, Continuous - 02/gases  polyethylene glycol, 17 g, oral, BID  sennosides-docusate sodium, 2 tablet, oral, BID    Continuous medications     PRN medications  PRN medications: bisacodyl, dextrose **OR** glucagon, hydrALAZINE, HYDROmorphone, naloxone, ondansetron **OR** ondansetron, oxyCODONE, oxyCODONE    Labs  Results for orders placed or performed during the hospital encounter of 03/07/25 (from the past 24 hours)   POCT GLUCOSE   Result Value Ref Range    POCT Glucose 111 (H) 74 - 99 mg/dL   POCT GLUCOSE   Result Value Ref Range    POCT Glucose 98 74 - 99 mg/dL   Magnesium   Result Value Ref Range    Magnesium 2.16 1.60 - 2.40 mg/dL   CBC   Result Value Ref Range    WBC 10.4 4.4 - 11.3 x10*3/uL    nRBC 0.0 0.0 - 0.0 /100 WBCs    RBC 4.09 (L) 4.50 -  5.90 x10*6/uL    Hemoglobin 12.3 (L) 13.5 - 17.5 g/dL    Hematocrit 37.5 (L) 41.0 - 52.0 %    MCV 92 80 - 100 fL    MCH 30.1 26.0 - 34.0 pg    MCHC 32.8 32.0 - 36.0 g/dL    RDW 12.7 11.5 - 14.5 %    Platelets 237 150 - 450 x10*3/uL   Renal Function Panel   Result Value Ref Range    Glucose 94 74 - 99 mg/dL    Sodium 137 136 - 145 mmol/L    Potassium 3.5 3.5 - 5.3 mmol/L    Chloride 100 98 - 107 mmol/L    Bicarbonate 29 21 - 32 mmol/L    Anion Gap 12 10 - 20 mmol/L    Urea Nitrogen 18 6 - 23 mg/dL    Creatinine 0.84 0.50 - 1.30 mg/dL    eGFR >90 >60 mL/min/1.73m*2    Calcium 8.4 (L) 8.6 - 10.6 mg/dL    Phosphorus 3.0 2.5 - 4.9 mg/dL    Albumin 3.6 3.4 - 5.0 g/dL   POCT GLUCOSE   Result Value Ref Range    POCT Glucose 93 74 - 99 mg/dL   POCT GLUCOSE   Result Value Ref Range    POCT Glucose 106 (H) 74 - 99 mg/dL       IMAGING/ DIAGNOSTIC TESTING:  I have personally reviewed the following test result(s):      XR chest 2 views 03/11/2025  Impression  1.  Slight progression of bibasal atelectasis. Small pleural fluid  collections are stable.    XR chest 1 view 03/10/2025  Impression  1. No evidence of pneumothorax development following chest tube  removal.  2. Bronchovascular crowding and bibasilar atelectasis likely postop  in nature. Stable small bilateral pleural effusions.    XR chest 1 view 03/09/2025 (Wet Read, post chest tube removal)  Impression  1.  No significant interval change and bibasilar consolidations and  small pleural effusions.  2. Postsurgical changes as detailed above.    IMPRESSION & PLAN:  POD # 4 s/p Ascending aortic repair, median sternotomy, central cannulation, [place] sternal plates x 3   - Increase activity/ ambulation; PT/OT  - Encourage IS, C/DB; respiratory therapy; wean O2 as artie   - Cardiac rehab referral   - Continue cardiac meds: ASA, BB, statin   - Pain and anticonstipation meds  - 3/9 Removed mediastinal chest tubes   - 2v CXR ordered on 3/11  - Per Dr. Flores CTA chest in 3-4 weeks  -  Postop echo not indicated   - Cut epicardial wires prior to discharge   - Tele until discharge  - Optimize nutrition and electrolytes    Rhythm  - Tele: Currently SR 80-90's  - 3/9 0248 AF -> SR 70's at approximately 10 AM   - 3/9 Cont amiodarone drip x 24 hours -> then transition to amio 400 mg bid x 6 days to complete a 5g load then start amio 200 every day for maintainance   - 3/9 Start metoprolol 12.5 mg bid   - 3/10 Will transition IV amiodarone to amiodarone 400 mg bid x 6 days for a total of a 5g load starting tonight, then on 3/17 start amiodarone 200 mg every day for   maintenance   - 3/11 Had one episode of -160's (regular-regular narrow complex tachycardia) self converted to SR 90's. Increased metoprolol from 12.5 mg bid to 25 mg bid starting tonight  - Continue BB  - Adjust medications as tolerated    Acute Blood Loss Anemia - stable  Recent Labs     03/11/25  0757 03/10/25  0814 03/09/25  0809 03/08/25  0020 03/07/25  1247 02/28/25  0841 01/24/25  0705   HGB 12.3* 12.2* 13.1* 13.5 13.8 15.1 15.7   HCT 37.5* 36.1* 40.5* 38.4* 38.4* 45.2 46.7   - MV, PO Iron x1mo  - Daily labs, transfuse as indicated    Thrombocytopenia  Recent Labs     03/11/25  0757 03/10/25  0814 03/09/25  0809 03/08/25  0020 03/07/25  1247 02/28/25  0841 01/24/25  0705    185 185 224 191 352 332   - Etiology likely postop/CPB related  - Continue to trend with daily CBCs    Volume/Electrolyte Status: Preop wt Weight: 104 kg (229 lb 8 oz)   Vitals:    03/11/25 0434   Weight: 102 kg (224 lb 14.4 oz)     - Weight: Pre-op 104 kg, 3/11 102 kg  - Adjust diuresis as needed for postop cardiac surgery hypervolemia  - Replete electrolytes for hypokalemia/hypomagnesemia/hypophosphatemia as needed; 3/10 replaced k, 3/11 replaced K  - 3/9 Lasix 40 mg IV x1  - 3/10 Lasix Lasix 40 mg IV x1  - 3/11 Lasix 20 mg IV x1  - Daily weights and strict I&Os  - Daily RFP while admitted    Leukocytosis - Resolved  Recent Labs     03/11/25  0756  03/10/25  0814 25  0809 25  0020 25  1247 25  0841 25  0705   WBC 10.4 10.7 15.8* 11.6* 14.3* 4.4 5.0     Temp (36hrs), Av.6 °C (97.9 °F), Min:36.2 °C (97.2 °F), Max:37.2 °C (99 °F)  - Likely reactive in nature   - aggressive pulmonary hygiene  - monitor for s/s infection  - likely atelectasis/ postoperative in etiology  - daily CBC to follow    Hypertension: home meds: amlodipine 5 mg every day, losartan 50 mg every day   Systolic (24hrs), Av , Min:99 , Max:115   - continue to hold home antihypertensive for now, will resume as clinically indicated   - 3/9 start metoprolol 12.5 mg bid   - 3/11 Increased metoprolol from 12.5 mg bid to 25 mg bid  - additional antihypertensives as needed     Hx of Prostate cancer   - s/p prostatectomy and radiation therapy in     Hx of back pain  - Spondylosis without myelopathy    VTE Prophylaxis: SCDs/TEDs, ambulation, SQ heparin  Code Status: Full Code    Dispo  - PT/OT recs: 3/8 low intensity with walker   - Would benefit from homecare RN for cardiac surgery carepath  - Anticipate discharge 1-2 days, pending stable rhythm, optimal fluid status and clinical progression   - Will continue to assess discharge needs      ONEIL Patton-CNP  Cardiac Surgery [facility]  [facility]  Team Phone [phone]

## 2025-03-11 NOTE — CARE PLAN
Problem: Fall/Injury  Goal: Not fall by end of shift  Outcome: Progressing  Goal: Be free from injury by end of the shift  Outcome: Progressing  Goal: Verbalize understanding of personal risk factors for fall in the hospital  Outcome: Progressing  Goal: Verbalize understanding of risk factor reduction measures to prevent injury from fall in the home  Outcome: Progressing  Goal: Use assistive devices by end of the shift  Outcome: Progressing  Goal: Pace activities to prevent fatigue by end of the shift  Outcome: Progressing     Problem: Safety - Adult  Goal: Free from fall injury  Outcome: Progressing     Problem: Discharge Planning  Goal: Discharge to home or other facility with appropriate resources  Outcome: Progressing     Problem: Chronic Conditions and Co-morbidities  Goal: Patient's chronic conditions and co-morbidity symptoms are monitored and maintained or improved  Outcome: Progressing     Problem: Nutrition  Goal: Nutrient intake appropriate for maintaining nutritional needs  Outcome: Progressing     Problem: Pain  Goal: Takes deep breaths with improved pain control throughout the shift  Outcome: Progressing  Goal: Turns in bed with improved pain control throughout the shift  Outcome: Progressing  Goal: Walks with improved pain control throughout the shift  Outcome: Progressing  Goal: Performs ADL's with improved pain control throughout shift  Outcome: Progressing  Goal: Participates in PT with improved pain control throughout the shift  Outcome: Progressing  Goal: Free from opioid side effects throughout the shift  Outcome: Progressing  Goal: Free from acute confusion related to pain meds throughout the shift  Outcome: Progressing   The patient's goals for the shift include      The clinical goals for the shift include patient will remain HDS throughout shift.

## 2025-03-12 LAB
ALBUMIN SERPL BCP-MCNC: 3.4 G/DL (ref 3.4–5)
ANION GAP SERPL CALC-SCNC: 14 MMOL/L (ref 10–20)
BUN SERPL-MCNC: 20 MG/DL (ref 6–23)
CALCIUM SERPL-MCNC: 8.2 MG/DL (ref 8.6–10.6)
CHLORIDE SERPL-SCNC: 101 MMOL/L (ref 98–107)
CO2 SERPL-SCNC: 25 MMOL/L (ref 21–32)
CREAT SERPL-MCNC: 0.87 MG/DL (ref 0.5–1.3)
EGFRCR SERPLBLD CKD-EPI 2021: >90 ML/MIN/1.73M*2
ERYTHROCYTE [DISTWIDTH] IN BLOOD BY AUTOMATED COUNT: 12.7 % (ref 11.5–14.5)
GLUCOSE SERPL-MCNC: 103 MG/DL (ref 74–99)
HCT VFR BLD AUTO: 36.3 % (ref 41–52)
HGB BLD-MCNC: 12.1 G/DL (ref 13.5–17.5)
MAGNESIUM SERPL-MCNC: 2.19 MG/DL (ref 1.6–2.4)
MCH RBC QN AUTO: 29.4 PG (ref 26–34)
MCHC RBC AUTO-ENTMCNC: 33.3 G/DL (ref 32–36)
MCV RBC AUTO: 88 FL (ref 80–100)
NRBC BLD-RTO: 0 /100 WBCS (ref 0–0)
PHOSPHATE SERPL-MCNC: 3.2 MG/DL (ref 2.5–4.9)
PLATELET # BLD AUTO: 244 X10*3/UL (ref 150–450)
POTASSIUM SERPL-SCNC: 3.7 MMOL/L (ref 3.5–5.3)
RBC # BLD AUTO: 4.12 X10*6/UL (ref 4.5–5.9)
SODIUM SERPL-SCNC: 136 MMOL/L (ref 136–145)
WBC # BLD AUTO: 10.1 X10*3/UL (ref 4.4–11.3)

## 2025-03-12 PROCEDURE — 1100000001 HC PRIVATE ROOM DAILY

## 2025-03-12 PROCEDURE — 2500000001 HC RX 250 WO HCPCS SELF ADMINISTERED DRUGS (ALT 637 FOR MEDICARE OP): Performed by: NURSE PRACTITIONER

## 2025-03-12 PROCEDURE — 2500000005 HC RX 250 GENERAL PHARMACY W/O HCPCS: Performed by: NURSE PRACTITIONER

## 2025-03-12 PROCEDURE — 97116 GAIT TRAINING THERAPY: CPT | Mod: GP,CQ

## 2025-03-12 PROCEDURE — 36415 COLL VENOUS BLD VENIPUNCTURE: CPT | Performed by: NURSE PRACTITIONER

## 2025-03-12 PROCEDURE — 2500000004 HC RX 250 GENERAL PHARMACY W/ HCPCS (ALT 636 FOR OP/ED): Performed by: NURSE PRACTITIONER

## 2025-03-12 PROCEDURE — 99232 SBSQ HOSP IP/OBS MODERATE 35: CPT | Performed by: NURSE PRACTITIONER

## 2025-03-12 PROCEDURE — 83735 ASSAY OF MAGNESIUM: CPT | Performed by: NURSE PRACTITIONER

## 2025-03-12 PROCEDURE — 85027 COMPLETE CBC AUTOMATED: CPT | Performed by: NURSE PRACTITIONER

## 2025-03-12 PROCEDURE — 94660 CPAP INITIATION&MGMT: CPT

## 2025-03-12 PROCEDURE — 2500000001 HC RX 250 WO HCPCS SELF ADMINISTERED DRUGS (ALT 637 FOR MEDICARE OP)

## 2025-03-12 PROCEDURE — 2500000002 HC RX 250 W HCPCS SELF ADMINISTERED DRUGS (ALT 637 FOR MEDICARE OP, ALT 636 FOR OP/ED): Performed by: NURSE PRACTITIONER

## 2025-03-12 PROCEDURE — 2500000002 HC RX 250 W HCPCS SELF ADMINISTERED DRUGS (ALT 637 FOR MEDICARE OP, ALT 636 FOR OP/ED)

## 2025-03-12 PROCEDURE — 80069 RENAL FUNCTION PANEL: CPT | Performed by: NURSE PRACTITIONER

## 2025-03-12 RX ORDER — METOPROLOL TARTRATE 25 MG/1
25 TABLET, FILM COATED ORAL ONCE
Status: COMPLETED | OUTPATIENT
Start: 2025-03-12 | End: 2025-03-12

## 2025-03-12 RX ORDER — FUROSEMIDE 10 MG/ML
40 INJECTION INTRAMUSCULAR; INTRAVENOUS ONCE
Status: COMPLETED | OUTPATIENT
Start: 2025-03-12 | End: 2025-03-12

## 2025-03-12 RX ORDER — CEPHALEXIN 500 MG/1
500 CAPSULE ORAL EVERY 6 HOURS SCHEDULED
Status: DISCONTINUED | OUTPATIENT
Start: 2025-03-12 | End: 2025-03-13 | Stop reason: HOSPADM

## 2025-03-12 RX ORDER — HYDROMORPHONE HYDROCHLORIDE 0.2 MG/ML
0.2 INJECTION INTRAMUSCULAR; INTRAVENOUS; SUBCUTANEOUS EVERY 2 HOUR PRN
Status: DISCONTINUED | OUTPATIENT
Start: 2025-03-12 | End: 2025-03-13 | Stop reason: HOSPADM

## 2025-03-12 RX ORDER — METHOCARBAMOL 500 MG/1
500 TABLET, FILM COATED ORAL ONCE
Status: COMPLETED | OUTPATIENT
Start: 2025-03-12 | End: 2025-03-12

## 2025-03-12 RX ORDER — ACETAMINOPHEN 500 MG
10 TABLET ORAL NIGHTLY PRN
Status: DISCONTINUED | OUTPATIENT
Start: 2025-03-12 | End: 2025-03-13 | Stop reason: HOSPADM

## 2025-03-12 RX ORDER — MAGNESIUM SULFATE HEPTAHYDRATE 40 MG/ML
2 INJECTION, SOLUTION INTRAVENOUS ONCE
Status: COMPLETED | OUTPATIENT
Start: 2025-03-12 | End: 2025-03-12

## 2025-03-12 RX ORDER — METOPROLOL TARTRATE 25 MG/1
37.5 TABLET, FILM COATED ORAL 2 TIMES DAILY
Status: DISCONTINUED | OUTPATIENT
Start: 2025-03-12 | End: 2025-03-12

## 2025-03-12 RX ORDER — POTASSIUM CHLORIDE 20 MEQ/1
40 TABLET, EXTENDED RELEASE ORAL ONCE
Status: COMPLETED | OUTPATIENT
Start: 2025-03-12 | End: 2025-03-12

## 2025-03-12 RX ORDER — METOPROLOL TARTRATE 25 MG/1
25 TABLET, FILM COATED ORAL 2 TIMES DAILY
Status: DISCONTINUED | OUTPATIENT
Start: 2025-03-12 | End: 2025-03-13 | Stop reason: HOSPADM

## 2025-03-12 RX ORDER — METOPROLOL TARTRATE 1 MG/ML
5 INJECTION, SOLUTION INTRAVENOUS ONCE
Status: DISCONTINUED | OUTPATIENT
Start: 2025-03-12 | End: 2025-03-12

## 2025-03-12 RX ADMIN — ACETAMINOPHEN 650 MG: 325 TABLET ORAL at 01:29

## 2025-03-12 RX ADMIN — AMIODARONE HYDROCHLORIDE 400 MG: 200 TABLET ORAL at 09:00

## 2025-03-12 RX ADMIN — ASPIRIN 81 MG: 81 TABLET, COATED ORAL at 09:00

## 2025-03-12 RX ADMIN — Medication 1 TABLET: at 09:00

## 2025-03-12 RX ADMIN — FUROSEMIDE 40 MG: 10 INJECTION, SOLUTION INTRAVENOUS at 12:03

## 2025-03-12 RX ADMIN — POLYSACCHARIDE-IRON COMPLEX 150 MG: 150 CAPSULE ORAL at 09:00

## 2025-03-12 RX ADMIN — ACETAMINOPHEN 650 MG: 325 TABLET ORAL at 14:51

## 2025-03-12 RX ADMIN — CEPHALEXIN 500 MG: 500 CAPSULE ORAL at 21:10

## 2025-03-12 RX ADMIN — METOPROLOL TARTRATE 25 MG: 25 TABLET, FILM COATED ORAL at 20:48

## 2025-03-12 RX ADMIN — AMIODARONE HYDROCHLORIDE 400 MG: 200 TABLET ORAL at 20:43

## 2025-03-12 RX ADMIN — MAGNESIUM SULFATE HEPTAHYDRATE 2 G: 40 INJECTION, SOLUTION INTRAVENOUS at 20:51

## 2025-03-12 RX ADMIN — METHOCARBAMOL 500 MG: 500 TABLET ORAL at 21:10

## 2025-03-12 RX ADMIN — METOPROLOL TARTRATE 25 MG: 25 TABLET, FILM COATED ORAL at 09:00

## 2025-03-12 RX ADMIN — ATORVASTATIN CALCIUM 40 MG: 40 TABLET, FILM COATED ORAL at 20:42

## 2025-03-12 RX ADMIN — ACETAMINOPHEN 650 MG: 325 TABLET ORAL at 20:42

## 2025-03-12 RX ADMIN — Medication 0.5 L/MIN: at 09:07

## 2025-03-12 RX ADMIN — ACETAMINOPHEN 650 MG: 325 TABLET ORAL at 09:00

## 2025-03-12 RX ADMIN — Medication 10 MG: at 20:51

## 2025-03-12 RX ADMIN — POTASSIUM CHLORIDE 40 MEQ: 1500 TABLET, EXTENDED RELEASE ORAL at 12:02

## 2025-03-12 ASSESSMENT — PAIN DESCRIPTION - DESCRIPTORS
DESCRIPTORS: SORE;ACHING
DESCRIPTORS: SORE;ACHING

## 2025-03-12 ASSESSMENT — COGNITIVE AND FUNCTIONAL STATUS - GENERAL
MOBILITY SCORE: 24
MOBILITY SCORE: 18
WALKING IN HOSPITAL ROOM: A LITTLE
DAILY ACTIVITIY SCORE: 24
CLIMB 3 TO 5 STEPS WITH RAILING: A LITTLE
TURNING FROM BACK TO SIDE WHILE IN FLAT BAD: A LITTLE
WALKING IN HOSPITAL ROOM: A LITTLE
CLIMB 3 TO 5 STEPS WITH RAILING: A LITTLE
DAILY ACTIVITIY SCORE: 24
STANDING UP FROM CHAIR USING ARMS: A LITTLE
MOBILITY SCORE: 22
MOVING FROM LYING ON BACK TO SITTING ON SIDE OF FLAT BED WITH BEDRAILS: A LITTLE
MOVING TO AND FROM BED TO CHAIR: A LITTLE

## 2025-03-12 ASSESSMENT — PAIN - FUNCTIONAL ASSESSMENT
PAIN_FUNCTIONAL_ASSESSMENT: 0-10

## 2025-03-12 ASSESSMENT — PAIN SCALES - GENERAL
PAINLEVEL_OUTOF10: 0 - NO PAIN
PAINLEVEL_OUTOF10: 4
PAINLEVEL_OUTOF10: 0 - NO PAIN
PAINLEVEL_OUTOF10: 3
PAINLEVEL_OUTOF10: 0 - NO PAIN
PAINLEVEL_OUTOF10: 0 - NO PAIN

## 2025-03-12 NOTE — PROGRESS NOTES
"CARDIAC SURGERY DAILY PROGRESS NOTE    Arvind \"Geremias\" Rick is a 64 yoM w/ PMHx HTN, prostate CA w/ spinal mets, ascending aortic aneurysm who presents to the CTICU s/p ascending aortic repair with Dr. Flores 3/7.    3/7/2025 OPERATION with Daysi Flores  1. Median Sternotomy  2. Central Cannulation  3. ASCENDING AORTA REPAIR WITH 30 MM GELWEAVE GRAFT  4. [place] Sternal Plates x three    Echo Pre/Post: Normal BiV, mild MR, mild RI  Chest Tubes/Drains: 2x meds   Temporary wires location/setting: VVI @ 50     CTICU course: uneventful    Transferred to the floor 3/8    Interval History:   3/9 0248 AF RVR -> metoprolol 5 mg IV x2 with minimal response with subsequent administration of amiodarone bolus and drip -> SR at around 10AM today.   3/10 No acute events reported overnight  3/11 No acute events reported  3/12  over night     SUBJECTIVE:  Tele SR 70s,  had one episode of -160's with subsequent conversion to SR without intervention. On RA. Patient denies any complaints.    Objective   BP 96/59 (BP Location: Right arm, Patient Position: Sitting)   Pulse 88   Temp 36.7 °C (98.1 °F) (Temporal)   Resp 17   Ht 1.829 m (6')   Wt 102 kg (224 lb 14.4 oz)   SpO2 95%   BMI 30.50 kg/m²   0-10 (Numeric) Pain Score: 0 - No pain   3 Day Weight Change: Unable to Calculate    Intake and Output    Intake/Output Summary (Last 24 hours) at 3/12/2025 1706  Last data filed at 3/12/2025 1400  Gross per 24 hour   Intake 1100 ml   Output 2150 ml   Net -1050 ml       Physical Exam  Physical Exam  Vitals and nursing note reviewed.   HENT:      Head: Normocephalic and atraumatic.      Nose: Nose normal.      Mouth/Throat:      Mouth: Mucous membranes are moist.   Eyes:      Conjunctiva/sclera: Conjunctivae normal.   Cardiovascular:      Rate and Rhythm: Normal rate and regular rhythm.      Pulses: Normal pulses.      Heart sounds: Normal heart sounds.      Comments: Tele: SR 80-90's  V wires connected to temp " pacer set to VVI 50  Pulmonary:      Effort: Pulmonary effort is normal.      Breath sounds: Rales present.      Comments: On RA  Some posterior crackles  Abdominal:      General: Bowel sounds are normal.      Palpations: Abdomen is soft.      Comments: BM 3/11   Genitourinary:     Comments: voiding  Musculoskeletal:      Cervical back: Neck supple.      Comments: STORY   Skin:     General: Skin is warm and dry.      Capillary Refill: Capillary refill takes less than 2 seconds.      Comments: Mid sternum incision well approximated, no erythema, no drainage.    Neurological:      General: No focal deficit present.      Mental Status: He is alert and oriented to person, place, and time. Mental status is at baseline.   Psychiatric:         Mood and Affect: Mood normal.         Behavior: Behavior normal.       Medications  Scheduled medications  acetaminophen, 650 mg, oral, q6h  [START ON 3/17/2025] amiodarone, 200 mg, oral, Daily  amiodarone, 400 mg, oral, BID  aspirin, 81 mg, oral, Daily  atorvastatin, 40 mg, oral, Nightly  iron polysaccharides, 150 mg, oral, Daily  lidocaine, 1 patch, transdermal, Daily  metoprolol tartrate, 37.5 mg, oral, BID  multivitamin with minerals, 1 tablet, oral, Daily  oxygen, , inhalation, Continuous - 02/gases  polyethylene glycol, 17 g, oral, BID  sennosides-docusate sodium, 2 tablet, oral, BID    Continuous medications     PRN medications  PRN medications: bisacodyl, dextrose **OR** glucagon, hydrALAZINE, HYDROmorphone, naloxone, ondansetron **OR** ondansetron, oxyCODONE, oxyCODONE    Labs  Results for orders placed or performed during the hospital encounter of 03/07/25 (from the past 24 hours)   POCT GLUCOSE   Result Value Ref Range    POCT Glucose 106 (H) 74 - 99 mg/dL   Magnesium   Result Value Ref Range    Magnesium 2.19 1.60 - 2.40 mg/dL   CBC   Result Value Ref Range    WBC 10.1 4.4 - 11.3 x10*3/uL    nRBC 0.0 0.0 - 0.0 /100 WBCs    RBC 4.12 (L) 4.50 - 5.90 x10*6/uL    Hemoglobin  12.1 (L) 13.5 - 17.5 g/dL    Hematocrit 36.3 (L) 41.0 - 52.0 %    MCV 88 80 - 100 fL    MCH 29.4 26.0 - 34.0 pg    MCHC 33.3 32.0 - 36.0 g/dL    RDW 12.7 11.5 - 14.5 %    Platelets 244 150 - 450 x10*3/uL   Renal Function Panel   Result Value Ref Range    Glucose 103 (H) 74 - 99 mg/dL    Sodium 136 136 - 145 mmol/L    Potassium 3.7 3.5 - 5.3 mmol/L    Chloride 101 98 - 107 mmol/L    Bicarbonate 25 21 - 32 mmol/L    Anion Gap 14 10 - 20 mmol/L    Urea Nitrogen 20 6 - 23 mg/dL    Creatinine 0.87 0.50 - 1.30 mg/dL    eGFR >90 >60 mL/min/1.73m*2    Calcium 8.2 (L) 8.6 - 10.6 mg/dL    Phosphorus 3.2 2.5 - 4.9 mg/dL    Albumin 3.4 3.4 - 5.0 g/dL     *Note: Due to a large number of results and/or encounters for the requested time period, some results have not been displayed. A complete set of results can be found in Results Review.       IMAGING/ DIAGNOSTIC TESTING:  I have personally reviewed the following test result(s):      XR chest 2 views 03/11/2025  Impression  1.  Slight progression of bibasal atelectasis. Small pleural fluid  collections are stable.    XR chest 1 view 03/10/2025  Impression  1. No evidence of pneumothorax development following chest tube  removal.  2. Bronchovascular crowding and bibasilar atelectasis likely postop  in nature. Stable small bilateral pleural effusions.    XR chest 1 view 03/09/2025 (Wet Read, post chest tube removal)  Impression  1.  No significant interval change and bibasilar consolidations and  small pleural effusions.  2. Postsurgical changes as detailed above.    IMPRESSION & PLAN:  POD # 5 s/p Ascending aortic repair, median sternotomy, central cannulation, [place] sternal plates x 3   - Increase activity/ ambulation; PT/OT  - Encourage IS, C/DB; respiratory therapy; wean O2 as artie   - Cardiac rehab referral   - Continue cardiac meds: ASA, BB, statin   - Pain and anticonstipation meds  - 3/9 Removed mediastinal chest tubes   - 2v CXR done 3/11  - Per Dr. Flores CTA chest in 3-4  weeks  - Postop echo not indicated   - Cut epicardial wires prior to discharge   - Tele until discharge  - Optimize nutrition and electrolytes    Rhythm  - Tele: Currently SR 70's  - 3/9 0248 AF -> SR 70's at approximately 10 AM   - 3/9 Cont amiodarone drip x 24 hours -> then transition to amio 400 mg bid x 6 days to complete a 5g load then start amio 200 every day for maintainance   - 3/9 Start metoprolol 12.5 mg bid   - 3/10 Will transition IV amiodarone to amiodarone 400 mg bid x 6 days for a total of a 5g load starting tonight, then on 3/17 start amiodarone 200 mg every day for   maintenance   - 3/11 Had one episode of -160's (regular-regular narrow complex tachycardia) self converted to SR 90's. Increased metoprolol from 12.5 mg bid to 25 mg bid starting tonight  3/12 another episode of SVT 140s, Metoprolol increased to 37.5 mg BID  - Continue adjusting BB  - Adjust medications as tolerated    Acute Blood Loss Anemia - stable  Recent Labs     03/12/25  0735 03/11/25  0757 03/10/25  0814 03/09/25  0809 03/08/25  0020 03/07/25  1247 02/28/25  0841   HGB 12.1* 12.3* 12.2* 13.1* 13.5 13.8 15.1   HCT 36.3* 37.5* 36.1* 40.5* 38.4* 38.4* 45.2   - MV, PO Iron x1mo  - Daily labs, transfuse as indicated    Thrombocytopenia  Recent Labs     03/12/25  0735 03/11/25  0757 03/10/25  0814 03/09/25  0809 03/08/25  0020 03/07/25  1247 02/28/25  0841    237 185 185 224 191 352   - Etiology likely postop/CPB related  - Continue to trend with daily CBCs    Volume/Electrolyte Status: Preop wt Weight: 104 kg (229 lb 8 oz)   Vitals:    03/11/25 0434   Weight: 102 kg (224 lb 14.4 oz)     - Weight: Pre-op 104 kg, 3/11 102 kg  - Adjust diuresis as needed for postop cardiac surgery hypervolemia  - Replete electrolytes as needed  - 3/9 Lasix 40 mg IV x1  - 3/10 Lasix Lasix 40 mg IV x1  - 3/11 Lasix 20 mg IV x1  3/12 40 IV Lasix   - Daily weights and strict I&Os  - Daily RFP while admitted    Leukocytosis - Resolved  Recent  Labs     25  0735 25  0757 03/10/25  0814 25  0809 25  0020 25  1247 25  0841   WBC 10.1 10.4 10.7 15.8* 11.6* 14.3* 4.4     Temp (36hrs), Av.5 °C (97.7 °F), Min:35.9 °C (96.6 °F), Max:37.1 °C (98.8 °F)  - Likely reactive in nature   - aggressive pulmonary hygiene  - monitor for s/s infection  - likely atelectasis/ postoperative in etiology  - daily CBC to follow    Hypertension: home meds: amlodipine 5 mg every day, losartan 50 mg every day   Systolic (24hrs), Av , Min:96 , Max:123   - continue to hold home antihypertensive for now, will resume as clinically indicated   - 3/9 start metoprolol 12.5 mg bid   - 3/11 Increased metoprolol from 12.5 mg bid to 25 mg bid  3/12 up to 37.5 mg BID  - additional antihypertensives as needed     Hx of Prostate cancer   - s/p prostatectomy and radiation therapy in     Hx of back pain  - Spondylosis without myelopathy    VTE Prophylaxis: SCDs/TEDs, ambulation, SQ heparin  Code Status: Full Code    Dispo  - PT/OT recs: 3/8 low intensity with walker   - Would benefit from homecare RN for cardiac surgery carepath  - Anticipate discharge 1-2 days, pending stable rhythm, optimal fluid status and clinical progression   - Will continue to assess discharge needs      ONEIL Davenport-CNP  Cardiac Surgery [facility]  [facility]  Team Phone [phone]

## 2025-03-12 NOTE — DISCHARGE INSTRUCTIONS
Don't forget to “Keep Your Move in the Tube!!”     Please refer to the “Move in the Tube” handout.  -- Load bearing activities can be completed if you are “staying in the tube.” If you are attempting load bearing activities, let pain be your guide with when trying an activity “out of the tube”. If an activity hurts or is uncomfortable go back to doing it while you stay “in the tube”. There is no time limit to “stay in the tube”.     -- Non-load bearing activities, which are your activities of daily living, can be completed with your arms “out of the tube” as long as you remain pain free. Some activities of daily living examples are dressing, personal care, showering, washing hair, and toilet hygiene.     Don't forget to KEEP YOUR MOVE IN THE TUBE and think of a KIERRA Lane dinosaur!                                       **IMPORTANT**  Prevention of Infective Endocarditis (Heart Infections) After Cardiac Surgery:    Infective endocarditis occurs when bacteria enters the bloodstream and then attaches to the heart. Patients with a new heart valve, repaired heart valve, or graft used to repair/replace their aorta are at higher risk for developing this because of the prosthetic (man-made) medical material in their heart. Endocarditis is rare but when you undergo certain medical or dental procedures, as listed below, it can give bacteria an opportunity to enter the blood and cause this type of infection. To prevent this, preventative antibiotics taken before these procedures are required.     Antibiotics are always required PRIOR to the following:  - Dental work with gingival, periapical, or other gingival perforation (such as tooth extractions, dental abscess drainage, and dental cleanings)  - Respiratory procedures with incisions or biopsies   - Cardiac or vascular procedures to place or replace prosthetic material (such as heart valves, stents, etc.)    Antibiotics are required in the following situations ONLY if an  infection is already present:  - Skin or soft tissue procedures with infected tissue  - Gastrointestinal procedures with GI infections  - Urinary or genital procedure with acute genitourinary infections    Antibiotic examples you should expect to be prescribed:  - Amoxicillin or Ampicillin are usually the  first choice  - Cephalexin, Clindamycin, or Vancomycin may be used if you are unable to tolerate/allergic to Amoxicillin or Ampicillin  - Amoxicillin or Ampicillin (if allergic, use Vancomycin) will cover for enterococcus if you have a known acute biliary tract infection   - Specific antibiotics for a known organism should be used when treating an active infection    Please notify your dentist/primary physician/cardiologist PRIOR to these types of procedures to obtain the proper antibiotics and prevent a serious infection in your heart. When in doubt, always ask!   Additionally, good oral hygiene (routine brushing, flossing, and dental care) and prompt treatment of other infections (such as UTIs and skin infections) are equally as important to prevent endocarditis!!          Additional Post-Operative Instructions:  - Remember to use your Incentive spirometer 10x/hr while awake. Remember to cough and deep breath.  - No NSAIDs (common over-the-counter NSAIDs are ibuprofen/Motrin/Advil, naproxen/Naprosyn/Aleve) for 3 months after cardiac surgery; if NSAIDs needed after 3 months, clear use with cardiologist before starting.       Your home medications may have changed after surgery. Carefully compare this list with your prescription bottles at home and set aside any medications you are told to not take so you do not confuse them. Do not dispose of any medications until your follow-up, since your doctors may restart some at your follow-up appointments. It is important to bring a complete, current list of your medications to any medical appointments or hospitalizations.    For any questions about your discharge, please  call the cardiac surgery office at [phone]

## 2025-03-12 NOTE — PROGRESS NOTES
"Physical Therapy    Physical Therapy Treatment    Patient Name: Arvind Cabrera \"Geremias\"  MRN: [id number]  Department: [place] [id number]  Room: [address] [place] [address]  Today's Date: 3/12/2025  Time Calculation  Start Time: 1502  Stop Time: 1519  Time Calculation (min): 17 min         Assessment/Plan   PT Assessment  PT Assessment Results: Decreased strength, Decreased endurance, Impaired balance  Rehab Prognosis: Excellent  Barriers to Discharge Home: No anticipated barriers  Evaluation/Treatment Tolerance: Patient tolerated treatment well  Medical Staff Made Aware: Yes  End of Session Communication: Bedside nurse  Assessment Comment: Pt demos good balance, safe gait without AD, and safe negotiation of flight of stairs with VSS. Remains appropriate for low intensity therapy  End of Session Patient Position: Up in chair, Alarm off, not on at start of session     PT Plan  Treatment/Interventions: Bed mobility, Transfer training, Gait training, Stair training, Balance training, Strengthening, Endurance training, Therapeutic exercise, Therapeutic activity, Home exercise program, Positioning  PT Plan: Ongoing PT  PT Frequency: 3 times per week  PT Discharge Recommendations: Low intensity level of continued care  Equipment Recommended upon Discharge: Wheeled walker  PT Recommended Transfer Status: Assist x1  PT - OK to Discharge: Yes      General Visit Information:   PT  Visit  PT Received On: 03/12/25  Response to Previous Treatment: Patient with no complaints from previous session.  General  Family/Caregiver Present: Yes  Caregiver Feedback: wife present and supportive  Prior to Session Communication: Bedside nurse  Patient Position Received: Up in chair, Alarm off, not on at start of session  General Comment: Pt sitting up in chair, pleasant and agreeable to therapy  Per handoff with RN, pt is appropriate for therapy, vitals are stable and pain is controlled. Other concerns prior to tx are: none    Subjective "   Precautions:  Precautions  Medical Precautions: Cardiac precautions  Post-Surgical Precautions: Move in the Tube  Precautions Comment: SBP<120; VVI@50; SpO2>92%     Date/Time Vitals Session Patient Position Pulse Resp SpO2 BP MAP (mmHg)    03/12/25 1453 --  --  --  --  95 %  --  --     03/12/25 1502 During PT  --  88  --  --  --  --      Objective   Pain:  Pain Assessment  Pain Assessment: 0-10  0-10 (Numeric) Pain Score: 0 - No pain  Cognition:  Cognition  Overall Cognitive Status: Within Functional Limits  Orientation Level: Oriented X4    Treatments:    Bed Mobility  Bed Mobility: No  Bed Mobility 1  Bed Mobility 1:  (pt up in chair at beginning and end of session)    Ambulation/Gait Training  Ambulation/Gait Training Performed: Yes  Ambulation/Gait Training 1  Surface 1: Level tile  Device 1: No device  Assistance 1: Close supervision  Comments/Distance (ft) 1: 300'x1  Transfers  Transfer: Yes  Transfer 1  Transfer From 1: Sit to, Stand to  Transfer to 1: Sit  Technique 1: Sit to stand, Stand to sit  Transfer Level of Assistance 1: Close supervision    Stairs  Stairs: Yes  Stairs  Rails 1: None (Comment)  Device 1: No device  Assistance 1: Close supervision  Comment/Number of Steps 1: 12 steps, non-recip sequence.    Outcome Measures:     [facility] Basic Mobility  Turning from your back to your side while in a flat bed without using bedrails: A little  Moving from lying on your back to sitting on the side of a flat bed without using bedrails: A little  Moving to and from bed to chair (including a wheelchair): A little  Standing up from a chair using your arms (e.g. wheelchair or bedside chair): A little  To walk in hospital room: A little  Climbing 3-5 steps with railing: A little  Basic Mobility - Total Score: 18    Education Documentation  Precautions, taught by Beti Frank PTA at 3/12/2025  3:58 PM.  Learner: Patient  Readiness: Acceptance  Method: Explanation, Demonstration  Response: Verbalizes  Understanding, Demonstrated Understanding  Comment: precautions, safe mobility    Body Mechanics, taught by Beti Frank PTA at 3/12/2025  3:58 PM.  Learner: Patient  Readiness: Acceptance  Method: Explanation, Demonstration  Response: Verbalizes Understanding, Demonstrated Understanding  Comment: precautions, safe mobility    Mobility Training, taught by Beti Frank PTA at 3/12/2025  3:58 PM.  Learner: Patient  Readiness: Acceptance  Method: Explanation, Demonstration  Response: Verbalizes Understanding, Demonstrated Understanding  Comment: precautions, safe mobility    Education Comments  No comments found.        OP EDUCATION:       Encounter Problems       Encounter Problems (Active)       Balance       Pt will demonstrate improved sitting/standing static/dynamic balance activities without LOB via score of 24/28 on the [place]etti for increase in safety prior to DC.  (Progressing)       Start:  03/08/25    Expected End:  03/22/25               Mobility       Pt will tolerate >15 minutes of upright standing activity without seated rest breaks with no changes in vital signs for improved functional mobility.  (Progressing)       Start:  03/08/25    Expected End:  03/22/25            Pt will ambulate >300ft with appropriate form, SBA or less, LRAD, and no LOB for safe DC.  (Progressing)       Start:  03/08/25    Expected End:  03/22/25            Pt will ambulate up/down 3 stairs with hand rail with CGA or less to safely access their home upon DC.   (Progressing)       Start:  03/08/25    Expected End:  03/22/25               PT Transfers       Pt will perform bed mobility and sit<>stand transfers with SBA or less and use of LRAD to safely DC.  (Progressing)       Start:  03/08/25    Expected End:  03/22/25                 UTE Spann

## 2025-03-12 NOTE — SIGNIFICANT EVENT
Rapid Response Nurse Note: RADAR alert: 6    Pager time: [phone]  Arrival time: 422  Event end time: 425  Location: [place] [address]  [] Triage by phone or secure messaging    Rapid response initiated by:  [] Rapid response RN [] Family [] Nursing Supervisor [] Physician   [x] RADAR auto page [] Sepsis auto-page [] RN [] RT   [] NP/PA [] Other:     Primary reason for call:   [] BAT [] New CPAP/BiPAP [] Bleeding [] Change in mental status   [] Chest pain [] Code blue [] FiO2 >/= 50% [] HR </= 40 bpm   [] HR >/= 130 bpm [] Hyperglycemia [] Hypoglycemia [x] RADAR    [] RR </= 8 bpm [] RR >/= 30 bpm [] SBP </= 90 mmHg [] SpO2 < 90%   [] Seizure [] Sepsis [] Shortness of breath  [] Staff concern: see comments     Initial VS and/or RADAR VS: ; RR 17; /80; SPO2 91% on room air.    Providers present at bedside (if applicable): Dr. Arabella Vega (primary Cardiac Surgery)    Interventions:  [] None [] ABG/VBG [] Assist w/ICU transfer [] BAT paged    [] Bag mask [] Blood [] Cardioversion [] Code Blue   [] Code blue for intubation [] Code status changed [] Chest x-ray [] EKG   [] IV fluid/bolus [] KUB x-ray [] Labs/cultures [] Medication   [] Nebulizer treatment [] NIPPV (CPAP/BiPAP) [] Oxygen [] Oral airway   [] Peripheral IV [] Palliative care consult [] CT/MRI [] Sepsis protocol    [] Suctioned [x] Other: see comment below     Outcome:  [] Coded and  [] Code blue for intubation [] Coded and transferred to ICU []  on division   [x] Remained on division (no change) [] Remained on division + additional monitoring [] Remained in ED [] Transferred to ED   [] Transferred to ICU [] Transferred to inpatient status [] Transferred for interventions (procedure) [] Transferred to ICU stepdown    [] Transferred to surgery [] Transferred to telemetry [] Sepsis protocol [] STEMI protocol   [] Stroke protocol [x] Bedside nurse instructed to page rapid response for any concerns or acute change in condition/VS  "    Additional Comments: Reviewed above RADAR VS with bedside RN who had already updated primary Cardiac Surgery service.  Patient endorsed slight \"fluttering.\"  Otherwise, patient without symptoms.  Plan IV Metoprolol per Dr. Vega  Staff to page rapid response for any concerns or acute change in condition/VS.  "

## 2025-03-13 ENCOUNTER — DOCUMENTATION (OUTPATIENT)
Dept: HOME HEALTH SERVICES | Facility: HOME HEALTH | Age: 65
End: 2025-03-13
Payer: COMMERCIAL

## 2025-03-13 ENCOUNTER — HOME HEALTH ADMISSION (OUTPATIENT)
Dept: HOME HEALTH SERVICES | Facility: HOME HEALTH | Age: 65
End: 2025-03-13
Payer: COMMERCIAL

## 2025-03-13 ENCOUNTER — PHARMACY VISIT (OUTPATIENT)
Dept: PHARMACY | Facility: CLINIC | Age: 65
End: 2025-03-13
Payer: COMMERCIAL

## 2025-03-13 VITALS
HEIGHT: 72 IN | DIASTOLIC BLOOD PRESSURE: 80 MMHG | TEMPERATURE: 97.2 F | RESPIRATION RATE: 18 BRPM | OXYGEN SATURATION: 94 % | SYSTOLIC BLOOD PRESSURE: 118 MMHG | BODY MASS INDEX: 30.48 KG/M2 | WEIGHT: 225 LBS | HEART RATE: 70 BPM

## 2025-03-13 LAB
ALBUMIN SERPL BCP-MCNC: 3.3 G/DL (ref 3.4–5)
ANION GAP SERPL CALC-SCNC: 13 MMOL/L (ref 10–20)
ATRIAL RATE: 157 BPM
BUN SERPL-MCNC: 22 MG/DL (ref 6–23)
CALCIUM SERPL-MCNC: 8.5 MG/DL (ref 8.6–10.6)
CHLORIDE SERPL-SCNC: 101 MMOL/L (ref 98–107)
CO2 SERPL-SCNC: 27 MMOL/L (ref 21–32)
CREAT SERPL-MCNC: 0.85 MG/DL (ref 0.5–1.3)
EGFRCR SERPLBLD CKD-EPI 2021: >90 ML/MIN/1.73M*2
ERYTHROCYTE [DISTWIDTH] IN BLOOD BY AUTOMATED COUNT: 12.7 % (ref 11.5–14.5)
GLUCOSE SERPL-MCNC: 102 MG/DL (ref 74–99)
HCT VFR BLD AUTO: 36.8 % (ref 41–52)
HGB BLD-MCNC: 12 G/DL (ref 13.5–17.5)
MAGNESIUM SERPL-MCNC: 2.47 MG/DL (ref 1.6–2.4)
MCH RBC QN AUTO: 29.6 PG (ref 26–34)
MCHC RBC AUTO-ENTMCNC: 32.6 G/DL (ref 32–36)
MCV RBC AUTO: 91 FL (ref 80–100)
NRBC BLD-RTO: 0 /100 WBCS (ref 0–0)
PHOSPHATE SERPL-MCNC: 3.6 MG/DL (ref 2.5–4.9)
PLATELET # BLD AUTO: 299 X10*3/UL (ref 150–450)
POTASSIUM SERPL-SCNC: 3.8 MMOL/L (ref 3.5–5.3)
Q ONSET: 216 MS
QRS COUNT: 26 BEATS
QRS DURATION: 80 MS
QT INTERVAL: 316 MS
QTC CALCULATION(BAZETT): 510 MS
QTC FREDERICIA: 435 MS
R AXIS: -13 DEGREES
RBC # BLD AUTO: 4.05 X10*6/UL (ref 4.5–5.9)
SODIUM SERPL-SCNC: 137 MMOL/L (ref 136–145)
T AXIS: 16 DEGREES
T OFFSET: 374 MS
VENTRICULAR RATE: 157 BPM
WBC # BLD AUTO: 10.7 X10*3/UL (ref 4.4–11.3)

## 2025-03-13 PROCEDURE — 2500000002 HC RX 250 W HCPCS SELF ADMINISTERED DRUGS (ALT 637 FOR MEDICARE OP, ALT 636 FOR OP/ED)

## 2025-03-13 PROCEDURE — RXMED WILLOW AMBULATORY MEDICATION CHARGE

## 2025-03-13 PROCEDURE — 2500000005 HC RX 250 GENERAL PHARMACY W/O HCPCS: Performed by: NURSE PRACTITIONER

## 2025-03-13 PROCEDURE — 2500000001 HC RX 250 WO HCPCS SELF ADMINISTERED DRUGS (ALT 637 FOR MEDICARE OP): Performed by: NURSE PRACTITIONER

## 2025-03-13 PROCEDURE — 83735 ASSAY OF MAGNESIUM: CPT | Performed by: NURSE PRACTITIONER

## 2025-03-13 PROCEDURE — 84100 ASSAY OF PHOSPHORUS: CPT | Performed by: NURSE PRACTITIONER

## 2025-03-13 PROCEDURE — 94660 CPAP INITIATION&MGMT: CPT

## 2025-03-13 PROCEDURE — 36415 COLL VENOUS BLD VENIPUNCTURE: CPT | Performed by: NURSE PRACTITIONER

## 2025-03-13 PROCEDURE — 85027 COMPLETE CBC AUTOMATED: CPT | Performed by: NURSE PRACTITIONER

## 2025-03-13 RX ORDER — ASPIRIN 81 MG/1
81 TABLET ORAL DAILY
COMMUNITY
Start: 2025-03-14

## 2025-03-13 RX ORDER — MULTIVIT-MIN/IRON FUM/FOLIC AC 7.5 MG-4
1 TABLET ORAL DAILY
COMMUNITY
Start: 2025-03-14

## 2025-03-13 RX ORDER — POLYETHYLENE GLYCOL 3350 17 G/17G
17 POWDER, FOR SOLUTION ORAL DAILY PRN
COMMUNITY
Start: 2025-03-13

## 2025-03-13 RX ORDER — FUROSEMIDE 20 MG/1
20 TABLET ORAL DAILY
Status: DISCONTINUED | OUTPATIENT
Start: 2025-03-13 | End: 2025-03-13 | Stop reason: HOSPADM

## 2025-03-13 RX ORDER — AMOXICILLIN 250 MG
2 CAPSULE ORAL 2 TIMES DAILY
Qty: 30 TABLET | Refills: 0 | Status: SHIPPED | OUTPATIENT
Start: 2025-03-13

## 2025-03-13 RX ORDER — CEPHALEXIN 500 MG/1
500 CAPSULE ORAL EVERY 6 HOURS SCHEDULED
Qty: 18 CAPSULE | Refills: 0 | Status: SHIPPED | OUTPATIENT
Start: 2025-03-13 | End: 2025-03-18

## 2025-03-13 RX ORDER — IRON POLYSACCHARIDE COMPLEX 150 MG
150 CAPSULE ORAL DAILY
Qty: 30 CAPSULE | Refills: 0 | Status: SHIPPED | OUTPATIENT
Start: 2025-03-14

## 2025-03-13 RX ORDER — AMIODARONE HYDROCHLORIDE 200 MG/1
400 TABLET ORAL 2 TIMES DAILY
Qty: 39 TABLET | Refills: 0 | Status: SHIPPED | OUTPATIENT
Start: 2025-03-13 | End: 2025-03-23

## 2025-03-13 RX ORDER — ACETAMINOPHEN, DIPHENHYDRAMINE HCL, PHENYLEPHRINE HCL 325; 25; 5 MG/1; MG/1; MG/1
10 TABLET ORAL NIGHTLY PRN
COMMUNITY
Start: 2025-03-13

## 2025-03-13 RX ORDER — METOPROLOL TARTRATE 25 MG/1
25 TABLET, FILM COATED ORAL 2 TIMES DAILY
Qty: 60 TABLET | Refills: 0 | Status: SHIPPED | OUTPATIENT
Start: 2025-03-13

## 2025-03-13 RX ORDER — FUROSEMIDE 20 MG/1
20 TABLET ORAL DAILY
Qty: 7 TABLET | Refills: 0 | Status: SHIPPED | OUTPATIENT
Start: 2025-03-13 | End: 2025-03-20

## 2025-03-13 RX ORDER — ATORVASTATIN CALCIUM 40 MG/1
40 TABLET, FILM COATED ORAL NIGHTLY
Qty: 30 TABLET | Refills: 0 | Status: SHIPPED | OUTPATIENT
Start: 2025-03-13

## 2025-03-13 RX ORDER — ACETAMINOPHEN 325 MG/1
650 TABLET ORAL EVERY 6 HOURS
COMMUNITY
Start: 2025-03-13

## 2025-03-13 RX ORDER — AMIODARONE HYDROCHLORIDE 200 MG/1
200 TABLET ORAL DAILY
Qty: 30 TABLET | Refills: 0 | Status: SHIPPED | OUTPATIENT
Start: 2025-03-17 | End: 2025-03-13

## 2025-03-13 RX ADMIN — Medication 0.5 L/MIN: at 08:00

## 2025-03-13 RX ADMIN — CEPHALEXIN 500 MG: 500 CAPSULE ORAL at 08:15

## 2025-03-13 RX ADMIN — POLYSACCHARIDE-IRON COMPLEX 150 MG: 150 CAPSULE ORAL at 08:15

## 2025-03-13 RX ADMIN — ACETAMINOPHEN 650 MG: 325 TABLET ORAL at 01:37

## 2025-03-13 RX ADMIN — ACETAMINOPHEN 650 MG: 325 TABLET ORAL at 08:15

## 2025-03-13 RX ADMIN — CEPHALEXIN 500 MG: 500 CAPSULE ORAL at 13:25

## 2025-03-13 RX ADMIN — ACETAMINOPHEN 650 MG: 325 TABLET ORAL at 13:25

## 2025-03-13 RX ADMIN — ASPIRIN 81 MG: 81 TABLET, COATED ORAL at 08:15

## 2025-03-13 RX ADMIN — METOPROLOL TARTRATE 25 MG: 25 TABLET, FILM COATED ORAL at 08:15

## 2025-03-13 RX ADMIN — FUROSEMIDE 20 MG: 20 TABLET ORAL at 11:46

## 2025-03-13 RX ADMIN — AMIODARONE HYDROCHLORIDE 400 MG: 200 TABLET ORAL at 08:15

## 2025-03-13 RX ADMIN — Medication 1 TABLET: at 08:15

## 2025-03-13 ASSESSMENT — PAIN DESCRIPTION - ORIENTATION: ORIENTATION: RIGHT

## 2025-03-13 ASSESSMENT — COGNITIVE AND FUNCTIONAL STATUS - GENERAL
DAILY ACTIVITIY SCORE: 24
CLIMB 3 TO 5 STEPS WITH RAILING: A LITTLE
MOBILITY SCORE: 23

## 2025-03-13 ASSESSMENT — PAIN SCALES - GENERAL
PAINLEVEL_OUTOF10: 0 - NO PAIN
PAINLEVEL_OUTOF10: 3
PAINLEVEL_OUTOF10: 0 - NO PAIN

## 2025-03-13 ASSESSMENT — PAIN DESCRIPTION - LOCATION: LOCATION: SHOULDER

## 2025-03-13 ASSESSMENT — PAIN - FUNCTIONAL ASSESSMENT
PAIN_FUNCTIONAL_ASSESSMENT: 0-10
PAIN_FUNCTIONAL_ASSESSMENT: 0-10

## 2025-03-13 NOTE — PROGRESS NOTES
ventricular wires cut at skin level due to surgeon preference.  Patient instructed to notify radiology of retained epicardial wires prior to any MRI procedure, and to notify Dr Flores of any visible wires or s/s infection.

## 2025-03-13 NOTE — PROGRESS NOTES
[facility] was notified of patient's discharge home today. They confirmed start of care in 24-48 hours.  Jordana Norwood RN

## 2025-03-13 NOTE — PROGRESS NOTES
"CARDIAC SURGERY DAILY PROGRESS NOTE    Arvind \"Geremias\" Rick is a 64 yoM w/ PMHx HTN, prostate CA w/ spinal mets, ascending aortic aneurysm who presents to the CTICU s/p ascending aortic repair with Dr. Flores 3/7.    3/7/2025 OPERATION with Daysi Flores  1. Median Sternotomy  2. Central Cannulation  3. ASCENDING AORTA REPAIR WITH 30 MM GELWEAVE GRAFT  4. [place] Sternal Plates x three    Echo Pre/Post: Normal BiV, mild MR, mild IL  Chest Tubes/Drains: 2x meds   Temporary wires location/setting: VVI @ 50     CTICU course: uneventful    Transferred to the floor 3/8    Interval History:   No acute events over night     SUBJECTIVE:  Tele SR 70s,    Was started on Keflex over night for R hand cellulitis at old IV site.    Objective   /80 (BP Location: Right arm, Patient Position: Lying)   Pulse 70   Temp 36.2 °C (97.2 °F) (Temporal)   Resp 18   Ht 1.829 m (6')   Wt 102 kg (225 lb)   SpO2 94%   BMI 30.52 kg/m²   0-10 (Numeric) Pain Score: 3   3 Day Weight Change: -0.378 kg (-13.3 oz) per day    Intake and Output    Intake/Output Summary (Last 24 hours) at 3/13/2025 0834  Last data filed at 3/12/2025 2152  Gross per 24 hour   Intake 980 ml   Output 2175 ml   Net -1195 ml       Physical Exam  Physical Exam  Vitals and nursing note reviewed.   HENT:      Head: Normocephalic and atraumatic.      Nose: Nose normal.      Mouth/Throat:      Mouth: Mucous membranes are moist.   Eyes:      Conjunctiva/sclera: Conjunctivae normal.   Cardiovascular:      Rate and Rhythm: Normal rate and regular rhythm.      Pulses: Normal pulses.      Heart sounds: Normal heart sounds.      Comments: Tele: SR 80-90's  Wires capped  Pulmonary:      Effort: Pulmonary effort is normal.      Breath sounds: Rales present.      Comments: On RA    Abdominal:      General: Bowel sounds are normal.      Palpations: Abdomen is soft.      Comments: BM 3/12   Genitourinary:     Comments: voiding  Musculoskeletal:      Cervical back: Neck " supple.      Comments: STORY   Skin:     General: Skin is warm and dry.      Capillary Refill: Capillary refill takes less than 2 seconds.      Comments: Mid sternum incision well approximated, no erythema, no drainage.    Neurological:      General: No focal deficit present.      Mental Status: He is alert and oriented to person, place, and time. Mental status is at baseline.   Psychiatric:         Mood and Affect: Mood normal.         Behavior: Behavior normal.         Medications  Scheduled medications  acetaminophen, 650 mg, oral, q6h  [START ON 3/17/2025] amiodarone, 200 mg, oral, Daily  amiodarone, 400 mg, oral, BID  aspirin, 81 mg, oral, Daily  atorvastatin, 40 mg, oral, Nightly  cephalexin, 500 mg, oral, q6h MATTHEW  iron polysaccharides, 150 mg, oral, Daily  lidocaine, 1 patch, transdermal, Daily  metoprolol tartrate, 25 mg, oral, BID  multivitamin with minerals, 1 tablet, oral, Daily  oxygen, , inhalation, Continuous - 02/gases  polyethylene glycol, 17 g, oral, BID  sennosides-docusate sodium, 2 tablet, oral, BID    Continuous medications     PRN medications  PRN medications: bisacodyl, dextrose **OR** glucagon, hydrALAZINE, HYDROmorphone, melatonin, naloxone, ondansetron **OR** ondansetron, oxyCODONE, oxyCODONE    Labs  No results found. However, due to the size of the patient record, not all encounters were searched. Please check Results Review for a complete set of results.      IMAGING/ DIAGNOSTIC TESTING:  I have personally reviewed the following test result(s):      XR chest 2 views 03/11/2025  Impression  1.  Slight progression of bibasal atelectasis. Small pleural fluid  collections are stable.    XR chest 1 view 03/10/2025  Impression  1. No evidence of pneumothorax development following chest tube  removal.  2. Bronchovascular crowding and bibasilar atelectasis likely postop  in nature. Stable small bilateral pleural effusions.    XR chest 1 view 03/09/2025 (Wet Read, post chest tube  removal)  Impression  1.  No significant interval change and bibasilar consolidations and  small pleural effusions.  2. Postsurgical changes as detailed above.    IMPRESSION & PLAN:  POD # 6 s/p Ascending aortic repair, median sternotomy, central cannulation, [place] sternal plates x 3   - Increase activity/ ambulation; PT/OT  - Encourage IS, C/DB; respiratory therapy; wean O2 as artie   - Cardiac rehab referral   - Continue cardiac meds: ASA, BB, statin   - Pain and anticonstipation meds  - 3/9 Removed mediastinal chest tubes   - 2v CXR done 3/11  - Per Dr. Flores CTA chest in 3-4 weeks  - Postop echo not indicated   - Cut epicardial wires prior to discharge   - Tele until discharge  - Optimize nutrition and electrolytes    Rhythm  - Tele: Currently SR 70's  - 3/9 0248 AF -> SR 70's at approximately 10 AM   - 3/9 Cont amiodarone drip x 24 hours -> then transition to amio 400 mg bid x 6 days to complete a 5g load then start amio 200 every day for maintainance   - 3/9 Start metoprolol 12.5 mg bid   - 3/10 Will transition IV amiodarone to amiodarone 400 mg bid x 6 days for a total of a 5g load starting tonight, then on 3/17 start amiodarone 200 mg every day for maintenance   - 3/11 Had one episode of -160's (regular-regular narrow complex tachycardia) self converted to SR 90's. Increased metoprolol from 12.5 mg bid to 25 mg bid starting tonight  3/12 another episode of SVT 140s, Metoprolol increased to 37.5 mg BID  - Continue adjusting BB  - Adjust medications as tolerated    Acute Blood Loss Anemia - stable  Recent Labs     03/12/25  0735 03/11/25  0757 03/10/25  0814 03/09/25  0809 03/08/25  0020 03/07/25  1247 02/28/25  0841   HGB 12.1* 12.3* 12.2* 13.1* 13.5 13.8 15.1   HCT 36.3* 37.5* 36.1* 40.5* 38.4* 38.4* 45.2   - MV, PO Iron x1mo  - Daily labs, transfuse as indicated    Thrombocytopenia  Recent Labs     03/12/25  0735 03/11/25  0757 03/10/25  0814 03/09/25  0809 03/08/25  0020 03/07/25  1247  25  0841    237 185 185 224 191 352   - Etiology likely postop/CPB related  - Continue to trend with daily CBCs    Volume/Electrolyte Status: Preop wt Weight: 104 kg (229 lb 8 oz)   Vitals:    25 0606   Weight: 102 kg (225 lb)     - Weight: Pre-op 104 kg, 3/11 102 kg  - Adjust diuresis as needed for postop cardiac surgery hypervolemia  - Replete electrolytes as needed  - 3/9 Lasix 40 mg IV x1  - 3/10 Lasix Lasix 40 mg IV x1  - 3/11 Lasix 20 mg IV x1  3/12 40 IV Lasix   - Daily weights and strict I&Os  - Daily RFP while admitted    Leukocytosis - Resolved  Recent Labs     25  0735 25  0757 03/10/25  0814 25  0809 25  0020 25  1247 25  0841   WBC 10.1 10.4 10.7 15.8* 11.6* 14.3* 4.4     Temp (36hrs), Av.4 °C (97.6 °F), Min:35.9 °C (96.6 °F), Max:37.1 °C (98.8 °F)  - Likely reactive in nature   - aggressive pulmonary hygiene  - monitor for s/s infection  - likely atelectasis/ postoperative in etiology  - daily CBC to follow    Hypertension: home meds: amlodipine 5 mg every day, losartan 50 mg every day   Systolic (24hrs), Av , Min:92 , Max:123   - continue to hold home antihypertensive for now, will resume as clinically indicated   - 3/9 start metoprolol 12.5 mg bid   - 3/11 Increased metoprolol from 12.5 mg bid to 25 mg bid  3/12 up to 37.5 mg BID  - additional antihypertensives as needed     Hx of Prostate cancer   - s/p prostatectomy and radiation therapy in     Hx of back pain  - Spondylosis without myelopathy    VTE Prophylaxis: SCDs/TEDs, ambulation, SQ heparin  Code Status: Full Code    Dispo  - PT/OT recs: 3/8 low intensity with walker   - Would benefit from homecare RN for cardiac surgery carepath  - stable to discharge today       ONEIL Davenport-CNP  Cardiac Surgery [facility]  [facility]  Team Phone [phone]

## 2025-03-13 NOTE — SIGNIFICANT EVENT
Rapid Response Nurse Note: RADAR alert: 8    Pager time:   Arrival time:   Event end time:   Location: [facility]  [] Triage by phone or secure messaging    Rapid response initiated by:  [] Rapid response RN [] Family [] Nursing Supervisor [] Physician   [x] RADAR auto page [] Sepsis auto-page [] RN [] RT   [] NP/PA [] Other:     Primary reason for call:   [] BAT [] New CPAP/BiPAP [] Bleeding [] Change in mental status   [] Chest pain [] Code blue [] FiO2 >/= 50% [] HR </= 40 bpm   [] HR >/= 130 bpm [] Hyperglycemia [] Hypoglycemia [x] RADAR    [] RR </= 8 bpm [] RR >/= 30 bpm [] SBP </= 90 mmHg [] SpO2 < 90%   [] Seizure [] Sepsis [] Shortness of breath  [] Staff concern: see comments     Initial VS and/or RADAR VS: T 36 °C; HR 73; RR 25; BP 92/56; SPO2 93%.      Interventions:  [x] None [] ABG/VBG [] Assist w/ICU transfer [] BAT paged    [] Bag mask [] Blood [] Cardioversion [] Code Blue   [] Code blue for intubation [] Code status changed [] Chest x-ray [] EKG   [] IV fluid/bolus [] KUB x-ray [] Labs/cultures [] Medication   [] Nebulizer treatment [] NIPPV (CPAP/BiPAP) [] Oxygen [] Oral airway   [] Peripheral IV [] Palliative care consult [] CT/MRI [] Sepsis protocol    [] Suctioned [] Other:     Outcome:  [] Coded and  [] Code blue for intubation [] Coded and transferred to ICU []  on division   [x] Remained on division (no change) [] Remained on division + additional monitoring [] Remained in ED [] Transferred to ED   [] Transferred to ICU [] Transferred to inpatient status [] Transferred for interventions (procedure) [] Transferred to ICU stepdown    [] Transferred to surgery [] Transferred to telemetry [] Sepsis protocol [] STEMI protocol   [] Stroke protocol [x] Bedside nurse instructed to page rapid response for any concerns or acute change in condition/VS     Additional Comments: Reviewed above RADAR VS with bedside RN via phone.  Vital signs within patient's current trends.  No acute  change in condition.  No interventions by rapid response team indicated at this time.  Staff to page rapid response for any concerns or acute change in condition or VS.

## 2025-03-13 NOTE — DISCHARGE SUMMARY
"Discharge Diagnosis  Aneurysm of ascending aorta without rupture (CMS-[facility])    Issues Requiring Follow-Up  Cardiology, cardiac surgery, PCP    Test Results Pending At Discharge  Pending Labs       Order Current Status    Surgical Pathology Exam In process            Hospital Course  Arvind \"Geremias\" Rick is a 64 yoM w/ PMHx HTN, prostate CA w/ spinal mets, ascending aortic aneurysm who presents to the CTICU s/p ascending aortic repair with Dr. Flores 3/7.    3/7/2025 OPERATION: by Daysi Flores  1. Median Sternotomy  2. Central Cannulation  3. ASCENDING AORTA REPAIR WITH 30 MM GELWEAVE GRAFT  4. [place] Sternal Plates x three    Echo Pre/Post: Normal BiV, mild MR, mild UT  Chest Tubes/Drains: 2x meds   Temporary wires location/setting: VVI @ 50     CTICU course: uneventful    Transferred to the floor 3/8    ========================================    Floor Course:  - Patient was diuresed for fluid volume overload post cardiac surgery; Preop weight: 104 kg, discharge wt: 102 kg  - On ASA, statin, BB, amiodarone by discharge  - Epicardial wires CUT on 3/13/25  - Telemetry at discharge NSR 60-70s  - 2v CXR done 3/11/25  - Postop echo not indicated   - Postop CTA to be done in 3-4 weeks  Per Dr. Flores   - Cardiac rehab referral was placed  - PT recs low intensity therapy  - Anticipate discharge to home with homecare    Discharged on 3/13/25      On day of discharge, vital signs were stable and no acute distress was noted. All questions were answered. After VS and labs were reviewed it was determined the patient was stable for discharge.   Hospital day of discharge management- spent >30 minutes coordinating the discharge and counseling/educating patient and family regarding discharge instructions.     ========================================    HISTORY:  Past Medical History:  Diagnosis Date  · Adverse effect of anesthesia      Reports significant hypotension following OR during prostate surgery, states during PACU " and was told it was during extubation of anesthesia.  · Anesthesia of skin      Numbness and tingling  · Arthritis      hips  · Carpal tunnel syndrome 11/19/2024  · Chronic bronchitis (Multi)    · High prostate specific antigen (PSA) 02/17/2023  · History of prostate cancer 03/17/2023  · Hypertension    · Numbness and tingling sensation of skin 11/19/2024  · Pain in left shoulder 10/13/2020    Shoulder pain, left  · Peripheral neuropathy    · Personal history of other specified conditions 11/22/2021    History of elevated prostate specific antigen (PSA)  · Postoperative complication 11/19/2024  · Prostate cancer (Multi)      2021 s/p prostatectomy and Radiation therapy  · Spondylosis without myelopathy or radiculopathy, lumbar region 08/26/2022  · Thoracic ascending aortic aneurysm (CMS-HCC)      CT Chest scan on 2/7/25-measuring 5.0 cm       Surgical History  Past Surgical History:  Procedure Laterality Date  · CARDIAC CATHETERIZATION N/A 01/24/2025    Procedure: Left & Right Heart Cath w Angiography & LV;  Surgeon: Santi Monroe MD;  Location: [place] [id number] Cardiac Cath Lab;  Service: Cardiovascular;  Laterality: N/A;  request 1/24 or 1/31  · OTHER SURGICAL HISTORY   03/23/2020    Carpal tunnel surgery  · OTHER SURGICAL HISTORY   12/22/2021    Robotic-assisted laparoscopic radical prostatectomy       Prescriptions Prior to Admission  Medications Prior to Admission  Medication Sig Dispense Refill Last Dose/Taking  · amLODIPine (Norvasc) 5 mg tablet TAKE 2 TABLETS DAILY. (Patient taking differently: Take 2 tablets (10 mg) by mouth once daily. Takes #1 tablet twice daily) 180 tablet 3 3/6/2025  · chlorhexidine (Hibiclens) 4 % external liquid Use for 5 days prior to surgery as body wash, do not use on face or genital region 473 mL 0 3/7/2025 Morning  · chlorhexidine (Peridex) 0.12 % solution Use 15 mL in the mouth or throat if needed for wound care for up to 2 days. Use as mouth wash for night before and morning of  surgery 473 mL 0 3/7/2025 Morning  · losartan (Cozaar) 50 mg tablet TAKE 1 TABLET BY MOUTH 2 TIMES A  tablet 3 3/6/2025       Patient has no known allergies.  Social History  Social History       Tobacco Use  · Smoking status: Never      Passive exposure: Never  · Smokeless tobacco: Never  Substance Use Topics  · Alcohol use: Yes      Comment: once every other week  · Drug use: Never  ========================================    Pertinent Physical Exam At Time of Discharge  See today's progress note  Home Medications     Medication List      START taking these medications     acetaminophen 325 mg tablet; Commonly known as: Tylenol; Take 2 tablets   (650 mg) by mouth every 6 hours.   amiodarone 200 mg tablet; Commonly known as: Pacerone; Take 2 tablets   (400 mg) by mouth 2 times a day for 6 doses, then take 1 tablet daily   aspirin 81 mg EC tablet; Take 1 tablet (81 mg) by mouth once daily.;   Start taking on: March 14, 2025   atorvastatin 40 mg tablet; Commonly known as: Lipitor; Take 1 tablet (40   mg) by mouth once daily at bedtime.   cephalexin 500 mg capsule; Commonly known as: Keflex; Take 1 capsule   (500 mg) by mouth every 6 hours for 18 doses.   furosemide 20 mg tablet; Commonly known as: Lasix; Take 1 tablet (20 mg)   by mouth once daily for 7 doses.   iron polysaccharides 150 mg iron capsule; Commonly known as:   Nu-Iron,Niferex; Take 1 capsule (150 mg) by mouth once daily.; Start   taking on: March 14, 2025   melatonin 10 mg tablet; Take 1 tablet (10 mg) by mouth as needed at   bedtime for sleep.   metoprolol tartrate 25 mg tablet; Commonly known as: Lopressor; Take 1   tablet (25 mg) by mouth 2 times a day.   multivitamin with minerals tablet; Take 1 tablet by mouth once daily.;   Start taking on: March 14, 2025   polyethylene glycol 17 gram packet; Commonly known as: Glycolax,   Miralax; Take 17 g by mouth once daily as needed (constipation).   sennosides-docusate sodium 8.6-50 mg tablet;  Commonly known as:   Vicky-Colace; Take 2 tablets by mouth 2 times a day.     STOP taking these medications     amLODIPine 5 mg tablet; Commonly known as: Norvasc   chlorhexidine 0.12 % solution; Commonly known as: Peridex   Geovanna-Hex 4 % external liquid; Generic drug: chlorhexidine   losartan 50 mg tablet; Commonly known as: Cozaar       Outpatient Follow-Up  Future Appointments   Date Time Provider Department Center   3/14/2025 To Be Determined Niya Weeks RN [facility]   5/21/2025  2:00 PM ONEIL Gill-CNP [id number]       ONEIL Davenport-CNP

## 2025-03-13 NOTE — RESEARCH NOTES
-TITAN Adverse Event-    SUBJECT NUMBER :       DATE OF EVENT: (DD/MM/YYYY)  13 Mar 2025     DATE STUDY STAFF AWARE OF EVENT: (DD/MM/YYYY)  13 Mar 2025     [] Death from Cardiovascular Cause  [] Acute aortic event  [] Aneurysm-related death  [] Elective ascending aortic aneurysm repair   [] Vicky-Operative Myocardial Infarction  [] Non-perioperative Myocardial Infarction   [] Cerebrovascular Accident  [] Re-Opening for bleeding   [x] Other    EVENT:   R hand cellulitis     DESCRIPTION OF EVENT:  Was started on Keflex  on Marc 13, 2025 due o for R hand cellulitis at old IV site     INTERVENTIONS/TREATMENTS:  Antibiotics po     EVENT OUTCOME:  [] Resolved []Resolved with sequelea []Death [x]Ongoing    If Resolved, Date Resolved:       IS/WAS THE EVENT SERIOUS? [x]No []Yes    IS/WAS THE EVENT UNEXPECTED? [x]No []Yes    RELATIONSHIP TO STUDY INTERVENTION  [x] Unlikely Related - no temporal association or the cause of the event has been identified, or the procedure cannot be implicated  [] Possibly Related - temporal association, but other etiologies are likely to be the cause, however involvement of the procedure cannot be excluded  [] Probably Related - temporal association and other etiologies are possible but unlikely  [] Related - definite association to procedure is certain

## 2025-03-13 NOTE — HH CARE COORDINATION
Home Care received a Referral for Nursing. We have processed the referral for a Start of Care on 24-48 HOURS .     If you have any questions or concerns, please feel free to contact us at [phone]. Follow the prompts, enter your five digit zip code, and you will be directed to your care team on WE[facility] 1.

## 2025-03-14 ENCOUNTER — PATIENT OUTREACH (OUTPATIENT)
Dept: CARE COORDINATION | Facility: CLINIC | Age: 65
End: 2025-03-14
Payer: COMMERCIAL

## 2025-03-14 ENCOUNTER — HOME CARE VISIT (OUTPATIENT)
Dept: HOME HEALTH SERVICES | Facility: HOME HEALTH | Age: 65
End: 2025-03-14

## 2025-03-14 SDOH — ECONOMIC STABILITY: FOOD INSECURITY
ARE ANY OF YOUR NEEDS URGENT? FOR EXAMPLE, UNCERTAINTY OF WHERE YOU WILL GET YOUR NEXT MEAL OR NOT HAVING THE MEDICATIONS YOU NEED TO TAKE TOMORROW.: NO

## 2025-03-14 SDOH — ECONOMIC STABILITY: GENERAL: WOULD YOU LIKE HELP WITH ANY OF THE FOLLOWING NEEDS?: I DONT NEED HELP WITH ANY OF THESE

## 2025-03-14 NOTE — PROGRESS NOTES
"Reviewed chart prior to patient outreach. Outreach call to patient to support a smooth transition of care from recent admission.    Discharge facility: [facility]   Discharge diagnosis: Aneurysm of ascending aorta without rupture   Admission date: 3/7/25  Discharge date:  3/13/25    Specialist Appointment Date: 3/20/25 cardiac surgeon     Spoke with patient, reviewed discharge medications, discharge instructions, assessed social needs, and provided education on importance of follow-up appointment with provider.     See  Hospital Encounter and Summary, and Discharge assessment below for further details. Information obtained from discharge summary from EMR.    Hospital Course  Arvind \"Geremias\" Rick is a 64 yoM w/ PMHx HTN, prostate CA w/ spinal mets, ascending aortic aneurysm who presents to the CTICU s/p ascending aortic repair with Dr. Flores 3/7.     3/7/2025 OPERATION: by Daysi Flores  1.Median Sternotomy  2.Central Cannulation  3.ASCENDING AORTA REPAIR WITH 30 MM GELWEAVE GRAFT  4.[place] Sternal Plates x three     Echo Pre/Post: Normal BiV, mild MR, mild MN  Chest Tubes/Drains: 2x meds   Temporary wires location/setting: VVI @ 50      CTICU course: uneventful     Transferred to the floor 3/8     ========================================     Floor Course:  - Patient was diuresed for fluid volume overload post cardiac surgery; Preop weight: 104 kg, discharge wt: 102 kg  - On ASA, statin, BB, amiodarone by discharge  - Epicardial wires CUT on 3/13/25  - Telemetry at discharge NSR 60-70s  - 2v CXR done 3/11/25  - Postop echo not indicated   - Postop CTA to be done in 3-4 weeks  Per Dr. Flores   - Cardiac rehab referral was placed  - PT recs low intensity therapy  - Anticipate discharge to home with homecare     Discharged on 3/13/25        On day of discharge, vital signs were stable and no acute distress was noted. All questions were answered. After VS and labs were reviewed it was determined the patient " was stable for discharge.      outreach:  Wrap Up  Wrap Up Additional Comments: Patient reports he is doing well. He is keeping a daily weight log, currently at his baseline. Maintaining a low sodium low fat diet. He has his wife check his incisions. (3/14/2025 11:04 AM)    Medications  Medications reviewed with patient/caregiver?: Yes (3/14/2025 11:04 AM)  Is the patient having any side effects they believe may be caused by any medication additions or changes?: No (3/14/2025 11:04 AM)  Does the patient have all medications ordered at discharge?: Yes (3/14/2025 11:04 AM)  Care Management Interventions: No intervention needed (3/14/2025 11:04 AM)  Is the patient taking all medications as directed (includes completed medication regime)?: Yes (3/14/2025 11:04 AM)    Appointments  Does the patient have a primary care provider?: Yes (3/14/2025 11:04 AM)  Care Management Interventions: Verified appointment date/time/provider (3/14/2025 11:04 AM)  Has the patient kept scheduled appointments due by today?: Yes (3/14/2025 11:04 AM)    Self Management  What is the home health agency?:  Home care (3/14/2025 11:04 AM)  Has home health visited the patient within 72 hours of discharge?: Call prior to 72 hours (Scheduled for visit today) (3/14/2025 11:04 AM)    Patient Teaching  Does the patient have access to their discharge instructions?: Yes (3/14/2025 11:04 AM)  Care Management Interventions: Reviewed instructions with patient (3/14/2025 11:04 AM)  What is the patient's perception of their health status since discharge?: Improving (3/14/2025 11:04 AM)  Is the patient/caregiver able to teach back the hierarchy of who to call/visit for symptoms/problems? PCP, Specialist, Home Health nurse, Urgent Care, ED, 911: Yes (3/14/2025 11:04 AM)    Will continue to monitor through transition period. Provided patient with my contact information for potential needs.    Aleida Hurd RN BSN  ACO Care Manager  [phone]

## 2025-03-17 LAB
LABORATORY COMMENT REPORT: NORMAL
PATH REPORT.FINAL DX SPEC: NORMAL
PATH REPORT.GROSS SPEC: NORMAL
PATH REPORT.RELEVANT HX SPEC: NORMAL
PATH REPORT.TOTAL CANCER: NORMAL

## 2025-03-18 DIAGNOSIS — Z98.890 STATUS POST CARDIAC SURGERY: ICD-10-CM

## 2025-03-18 DIAGNOSIS — R26.9 IMPAIRED GAIT: ICD-10-CM

## 2025-03-18 DIAGNOSIS — I71.21 ANEURYSM OF ASCENDING AORTA WITHOUT RUPTURE: ICD-10-CM

## 2025-03-20 ENCOUNTER — TELEMEDICINE CLINICAL SUPPORT (OUTPATIENT)
Dept: CARDIAC SURGERY | Facility: CLINIC | Age: 65
End: 2025-03-20
Payer: COMMERCIAL

## 2025-03-24 NOTE — PROGRESS NOTES
Virtual visit with Arvind Cabrera this morning who states he's progressing well. He denies chest discomfort, dyspnea, palpitations, orthopnea.  Patient is endorsing that though she is recovering well in the immediate post-op period, she is with generalized fatigue.   S/p ASCENDING AORTA REPAIR 3/7 with Dr. Mark RICHARDSON:     Kristie: On interview, patient is alert, conversant, in no acute distress, appears to be in good spirits.   Respiratory: mild sob with ambulation, however, resolves with rest.   Surgical incision: BRENNAN, well approximated, C/D/I. Sternal stability, denies pain, MITT.   Medications: taking all his medications as directed.   Homecare: weekly visits.   Physical activity: slowly increasing his activity as tolerated.   On average: 120/65, 73hr, wt, denies wt gain. : His edema has resolved with furosemide.  Questions: Discussed cardiac rehab, driving privileges and dietary guidelines.     Patient's main complaint is that: pt denies any concerns at this time.      Patient encouraged to call me with any questions/concerns or if needs arise. Patient verbalized understanding.         Sandra Huber RN

## 2025-03-27 ENCOUNTER — TELEMEDICINE CLINICAL SUPPORT (OUTPATIENT)
Dept: CARDIAC SURGERY | Facility: CLINIC | Age: 65
End: 2025-03-27
Payer: COMMERCIAL

## 2025-03-28 ENCOUNTER — TELEPHONE (OUTPATIENT)
Dept: CARDIAC SURGERY | Facility: CLINIC | Age: 65
End: 2025-03-28
Payer: COMMERCIAL

## 2025-03-28 NOTE — TELEPHONE ENCOUNTER
Pt states that he has been having elevated BP since yesterday evening despite taking home medications. Pt states that he consumed a high sodium dinner. He denies wt gain.   /90, 160/75 HR 88, 85. Per discharge summary,  amlodipine was discontinued after surgery.     Dr. Corona was contacted. Awaiting orders.

## 2025-03-28 NOTE — TELEPHONE ENCOUNTER
Per Dr. Corona, pt is to resume Amlodipine 5mg BID. Advised to continue monitoring BP and call back if it remains high.     Pt verbalized understanding.

## 2025-03-31 NOTE — PROGRESS NOTES
Virtual visit with Arvind Cabrera this morning who states he continues to recover at home. This visit is a f/up from last week.  He denies chest discomfort, dyspnea, palpitations, orthopnea.      S/p ASCENDING AORTA REPAIR 3/7 with Dr. Mark RICHARDSON:     Kristie: On interview, patient is alert, conversant, in no acute distress, appears to be in good spirits.   Respiratory: c/o cough and upper airway congestion. Denies sob. Denies being around sick people.   Surgical incision: BRENNAN, well approximated, C/D/I. Sternal stability, denies pain, MITT.   Medications: taking all medication as directed. CCB and ARB on hold.   Homecare: weekly visits thus far.   Physical activity: slowly increasing his walking as tolerated.   On average: 125/65, HR88, His edema has resolved with furosemide.  Questions: Discussed cardiac rehab, driving privileges and dietary guidelines.     Patient's main complaint is that: no c/o at this time.     Patient encouraged to call me with any questions/concerns or if needs arise. Patient verbalized understanding.         Sandra Huber RN

## 2025-04-02 ENCOUNTER — HOSPITAL ENCOUNTER (OUTPATIENT)
Dept: RADIOLOGY | Facility: HOSPITAL | Age: 65
Discharge: HOME | End: 2025-04-02
Payer: COMMERCIAL

## 2025-04-02 ENCOUNTER — APPOINTMENT (OUTPATIENT)
Dept: PRIMARY CARE | Facility: CLINIC | Age: 65
End: 2025-04-02
Payer: COMMERCIAL

## 2025-04-02 ENCOUNTER — LAB (OUTPATIENT)
Dept: LAB | Facility: HOSPITAL | Age: 65
End: 2025-04-02
Payer: COMMERCIAL

## 2025-04-02 ENCOUNTER — LAB REQUISITION (OUTPATIENT)
Dept: LAB | Facility: HOSPITAL | Age: 65
End: 2025-04-02

## 2025-04-02 VITALS
HEIGHT: 71 IN | OXYGEN SATURATION: 95 % | HEART RATE: 72 BPM | BODY MASS INDEX: 32.2 KG/M2 | WEIGHT: 230 LBS | DIASTOLIC BLOOD PRESSURE: 66 MMHG | SYSTOLIC BLOOD PRESSURE: 118 MMHG

## 2025-04-02 DIAGNOSIS — I71.21 ASCENDING AORTIC ANEURYSM, UNSPECIFIED WHETHER RUPTURED: ICD-10-CM

## 2025-04-02 DIAGNOSIS — R05.1 ACUTE COUGH: Primary | ICD-10-CM

## 2025-04-02 DIAGNOSIS — I71.21 ANEURYSM OF ASCENDING AORTA WITHOUT RUPTURE: ICD-10-CM

## 2025-04-02 DIAGNOSIS — I71.21 ANEURYSM OF ASCENDING AORTA WITHOUT RUPTURE: Primary | ICD-10-CM

## 2025-04-02 DIAGNOSIS — I71.10: ICD-10-CM

## 2025-04-02 LAB
ABO GROUP (TYPE) IN BLOOD: NORMAL
ANION GAP SERPL CALC-SCNC: 14 MMOL/L (ref 10–20)
ANTIBODY SCREEN: NORMAL
BUN SERPL-MCNC: 20 MG/DL (ref 6–23)
CALCIUM SERPL-MCNC: 9.3 MG/DL (ref 8.6–10.3)
CHLORIDE SERPL-SCNC: 103 MMOL/L (ref 98–107)
CO2 SERPL-SCNC: 26 MMOL/L (ref 21–32)
CREAT SERPL-MCNC: 1.04 MG/DL (ref 0.5–1.3)
EGFRCR SERPLBLD CKD-EPI 2021: 81 ML/MIN/1.73M*2
ERYTHROCYTE [DISTWIDTH] IN BLOOD BY AUTOMATED COUNT: 13.2 % (ref 11.5–14.5)
GLUCOSE SERPL-MCNC: 84 MG/DL (ref 74–99)
HCT VFR BLD AUTO: 43.9 % (ref 41–52)
HGB BLD-MCNC: 14 G/DL (ref 13.5–17.5)
MCH RBC QN AUTO: 28.6 PG (ref 26–34)
MCHC RBC AUTO-ENTMCNC: 31.9 G/DL (ref 32–36)
MCV RBC AUTO: 90 FL (ref 80–100)
NRBC BLD-RTO: 0 /100 WBCS (ref 0–0)
PLATELET # BLD AUTO: 434 X10*3/UL (ref 150–450)
POTASSIUM SERPL-SCNC: 4.5 MMOL/L (ref 3.5–5.3)
RBC # BLD AUTO: 4.9 X10*6/UL (ref 4.5–5.9)
RH FACTOR (ANTIGEN D): NORMAL
SODIUM SERPL-SCNC: 138 MMOL/L (ref 136–145)
WBC # BLD AUTO: 8.5 X10*3/UL (ref 4.4–11.3)

## 2025-04-02 PROCEDURE — 3074F SYST BP LT 130 MM HG: CPT

## 2025-04-02 PROCEDURE — 99214 OFFICE O/P EST MOD 30 MIN: CPT

## 2025-04-02 PROCEDURE — 71046 X-RAY EXAM CHEST 2 VIEWS: CPT | Performed by: RADIOLOGY

## 2025-04-02 PROCEDURE — 80048 BASIC METABOLIC PNL TOTAL CA: CPT

## 2025-04-02 PROCEDURE — 71275 CT ANGIOGRAPHY CHEST: CPT | Performed by: RADIOLOGY

## 2025-04-02 PROCEDURE — 3008F BODY MASS INDEX DOCD: CPT

## 2025-04-02 PROCEDURE — 86901 BLOOD TYPING SEROLOGIC RH(D): CPT

## 2025-04-02 PROCEDURE — RXMED WILLOW AMBULATORY MEDICATION CHARGE

## 2025-04-02 PROCEDURE — 71275 CT ANGIOGRAPHY CHEST: CPT

## 2025-04-02 PROCEDURE — 71046 X-RAY EXAM CHEST 2 VIEWS: CPT

## 2025-04-02 PROCEDURE — 1036F TOBACCO NON-USER: CPT

## 2025-04-02 PROCEDURE — 2550000001 HC RX 255 CONTRASTS: Performed by: NURSE PRACTITIONER

## 2025-04-02 PROCEDURE — 86900 BLOOD TYPING SEROLOGIC ABO: CPT

## 2025-04-02 PROCEDURE — 85027 COMPLETE CBC AUTOMATED: CPT

## 2025-04-02 PROCEDURE — 3078F DIAST BP <80 MM HG: CPT

## 2025-04-02 PROCEDURE — 86850 RBC ANTIBODY SCREEN: CPT

## 2025-04-02 RX ORDER — LOSARTAN POTASSIUM 50 MG/1
50 TABLET ORAL 2 TIMES DAILY
COMMUNITY

## 2025-04-02 RX ORDER — ALBUTEROL SULFATE 90 UG/1
2 INHALANT RESPIRATORY (INHALATION) EVERY 4 HOURS PRN
Qty: 6.7 G | Refills: 5 | Status: SHIPPED | OUTPATIENT
Start: 2025-04-02 | End: 2026-04-02

## 2025-04-02 RX ADMIN — IOHEXOL 70 ML: 350 INJECTION, SOLUTION INTRAVENOUS at 13:33

## 2025-04-02 ASSESSMENT — PATIENT HEALTH QUESTIONNAIRE - PHQ9
1. LITTLE INTEREST OR PLEASURE IN DOING THINGS: NOT AT ALL
SUM OF ALL RESPONSES TO PHQ9 QUESTIONS 1 AND 2: 0
2. FEELING DOWN, DEPRESSED OR HOPELESS: NOT AT ALL

## 2025-04-02 NOTE — PROGRESS NOTES
"Subjective   Patient ID: Geremias Cabrera is a 64 y.o. male who presents for Follow-up (Hospital discharge for open heart surgery, has cough).    Patient presents to establish primary care.  He is here post AAA repair.  Has just had diagnostic labs performed at the hospital and is scheduled follow-up with cardiology.      Preventive health: CACS done, AAA repaired.  Colonoscopy 2023, no annual vaccinations.    Acute cough: Patient has had acute cough x 4 weeks, unlikely to be postsurgical in nature, just had CT of chest for PE which was negative does have some scattered rhonchi in the left lung which clear with cough, chest x-ray results still in process.  He states he feels like he is getting better he is speaking in full sentences and is fully active at home.  Albuterol inhaler prescribed to help with clearing secretions he is already taking Mucinex.           Review of Systems   Constitutional:  Negative for chills, diaphoresis, fatigue and unexpected weight change.   HENT:  Negative for dental problem, tinnitus and trouble swallowing.    Eyes:  Negative for visual disturbance.   Respiratory:  Positive for cough. Negative for chest tightness and shortness of breath.    Cardiovascular:  Negative for chest pain, palpitations and leg swelling.   Gastrointestinal:  Negative for abdominal pain, constipation, diarrhea, nausea and rectal pain.   Endocrine: Negative for polydipsia, polyphagia and polyuria.   Genitourinary:  Negative for difficulty urinating, frequency and urgency.   Musculoskeletal:  Negative for arthralgias and myalgias.   Skin:  Negative for pallor and wound.   Neurological:  Negative for syncope, weakness, numbness and headaches.   Psychiatric/Behavioral:  Negative for suicidal ideas. The patient is not nervous/anxious.        Objective   /66 (BP Location: Left arm, Patient Position: Sitting)   Pulse 72   Ht 1.803 m (5' 11\")   Wt 104 kg (230 lb)   SpO2 95%   BMI 32.08 kg/m²     Physical " Exam  Vitals and nursing note reviewed.   Constitutional:       General: He is not in acute distress.     Appearance: Normal appearance.   HENT:      Head: Normocephalic.      Nose: Nose normal.      Mouth/Throat:      Mouth: Mucous membranes are moist.      Pharynx: Oropharynx is clear.   Eyes:      General: No scleral icterus.     Pupils: Pupils are equal, round, and reactive to light.   Neck:      Vascular: No carotid bruit.   Cardiovascular:      Rate and Rhythm: Normal rate and regular rhythm.      Pulses: Normal pulses.      Heart sounds: Normal heart sounds. No murmur heard.  Pulmonary:      Effort: Pulmonary effort is normal. No respiratory distress.      Breath sounds: Normal breath sounds. No stridor. No wheezing, rhonchi or rales.   Abdominal:      General: Bowel sounds are normal. There is no distension.      Palpations: Abdomen is soft.      Tenderness: There is no abdominal tenderness. There is no right CVA tenderness or left CVA tenderness.   Musculoskeletal:         General: No swelling. Normal range of motion.      Cervical back: Normal range of motion.      Right lower leg: No edema.      Left lower leg: No edema.   Skin:     General: Skin is warm and dry.      Capillary Refill: Capillary refill takes less than 2 seconds.   Neurological:      General: No focal deficit present.      Mental Status: He is alert and oriented to person, place, and time. Mental status is at baseline.   Psychiatric:         Mood and Affect: Mood normal.         Behavior: Behavior normal.         Thought Content: Thought content normal.         Judgment: Judgment normal.         Assessment/Plan   Diagnoses and all orders for this visit:  Acute cough  -     albuterol (Ventolin HFA) 90 mcg/actuation inhaler; Inhale 2 puffs every 4 hours if needed for wheezing or shortness of breath.

## 2025-04-03 ENCOUNTER — PHARMACY VISIT (OUTPATIENT)
Dept: PHARMACY | Facility: CLINIC | Age: 65
End: 2025-04-03
Payer: COMMERCIAL

## 2025-04-03 LAB
ANION GAP SERPL CALCULATED.4IONS-SCNC: 13 MMOL/L (CALC) (ref 7–17)
BUN SERPL-MCNC: 21 MG/DL (ref 7–25)
BUN/CREAT SERPL: NORMAL (CALC) (ref 6–22)
CALCIUM SERPL-MCNC: 9.4 MG/DL (ref 8.6–10.3)
CHLORIDE SERPL-SCNC: 102 MMOL/L (ref 98–110)
CO2 SERPL-SCNC: 24 MMOL/L (ref 20–32)
CREAT SERPL-MCNC: 0.99 MG/DL (ref 0.7–1.35)
EGFRCR SERPLBLD CKD-EPI 2021: 86 ML/MIN/1.73M2
GLUCOSE SERPL-MCNC: 81 MG/DL (ref 65–99)
POTASSIUM SERPL-SCNC: 4.5 MMOL/L (ref 3.5–5.3)
SODIUM SERPL-SCNC: 139 MMOL/L (ref 135–146)

## 2025-04-03 ASSESSMENT — ENCOUNTER SYMPTOMS
NERVOUS/ANXIOUS: 0
CHILLS: 0
POLYPHAGIA: 0
HEADACHES: 0
SHORTNESS OF BREATH: 0
TROUBLE SWALLOWING: 0
MYALGIAS: 0
NUMBNESS: 0
ARTHRALGIAS: 0
UNEXPECTED WEIGHT CHANGE: 0
DIAPHORESIS: 0
FREQUENCY: 0
DIARRHEA: 0
DIFFICULTY URINATING: 0
FATIGUE: 0
WOUND: 0
CONSTIPATION: 0
CHEST TIGHTNESS: 0
WEAKNESS: 0
COUGH: 1
RECTAL PAIN: 0
PALPITATIONS: 0
NAUSEA: 0
ABDOMINAL PAIN: 0
POLYDIPSIA: 0

## 2025-04-07 ENCOUNTER — APPOINTMENT (OUTPATIENT)
Dept: RADIOLOGY | Facility: HOSPITAL | Age: 65
End: 2025-04-07
Payer: COMMERCIAL

## 2025-04-07 ENCOUNTER — OFFICE VISIT (OUTPATIENT)
Dept: CARDIAC SURGERY | Facility: CLINIC | Age: 65
End: 2025-04-07
Payer: COMMERCIAL

## 2025-04-07 VITALS
WEIGHT: 228 LBS | BODY MASS INDEX: 30.88 KG/M2 | HEIGHT: 72 IN | DIASTOLIC BLOOD PRESSURE: 80 MMHG | OXYGEN SATURATION: 96 % | SYSTOLIC BLOOD PRESSURE: 114 MMHG | HEART RATE: 65 BPM | TEMPERATURE: 98.6 F

## 2025-04-07 DIAGNOSIS — Z98.890 S/P AORTIC ANEURYSM REPAIR: ICD-10-CM

## 2025-04-07 DIAGNOSIS — Z86.79 S/P AORTIC ANEURYSM REPAIR: ICD-10-CM

## 2025-04-07 PROCEDURE — 3074F SYST BP LT 130 MM HG: CPT | Performed by: THORACIC SURGERY (CARDIOTHORACIC VASCULAR SURGERY)

## 2025-04-07 PROCEDURE — 3008F BODY MASS INDEX DOCD: CPT | Performed by: THORACIC SURGERY (CARDIOTHORACIC VASCULAR SURGERY)

## 2025-04-07 PROCEDURE — 3079F DIAST BP 80-89 MM HG: CPT | Performed by: THORACIC SURGERY (CARDIOTHORACIC VASCULAR SURGERY)

## 2025-04-07 PROCEDURE — 99211 OFF/OP EST MAY X REQ PHY/QHP: CPT | Performed by: THORACIC SURGERY (CARDIOTHORACIC VASCULAR SURGERY)

## 2025-04-07 RX ORDER — AMLODIPINE BESYLATE 5 MG/1
5 TABLET ORAL 2 TIMES DAILY
COMMUNITY
End: 2025-04-07 | Stop reason: SDUPTHER

## 2025-04-07 SDOH — ECONOMIC STABILITY: FOOD INSECURITY: WITHIN THE PAST 12 MONTHS, YOU WORRIED THAT YOUR FOOD WOULD RUN OUT BEFORE YOU GOT MONEY TO BUY MORE.: NEVER TRUE

## 2025-04-07 SDOH — ECONOMIC STABILITY: FOOD INSECURITY: WITHIN THE PAST 12 MONTHS, THE FOOD YOU BOUGHT JUST DIDN'T LAST AND YOU DIDN'T HAVE MONEY TO GET MORE.: NEVER TRUE

## 2025-04-07 ASSESSMENT — ENCOUNTER SYMPTOMS
OCCASIONAL FEELINGS OF UNSTEADINESS: 0
DEPRESSION: 0
LOSS OF SENSATION IN FEET: 0

## 2025-04-07 ASSESSMENT — LIFESTYLE VARIABLES
AUDIT-C TOTAL SCORE: 2
HOW OFTEN DO YOU HAVE A DRINK CONTAINING ALCOHOL: 2-4 TIMES A MONTH
HOW OFTEN DO YOU HAVE SIX OR MORE DRINKS ON ONE OCCASION: NEVER
HOW MANY STANDARD DRINKS CONTAINING ALCOHOL DO YOU HAVE ON A TYPICAL DAY: 1 OR 2
SKIP TO QUESTIONS 9-10: 1

## 2025-04-07 ASSESSMENT — PAIN SCALES - GENERAL: PAINLEVEL_OUTOF10: 0-NO PAIN

## 2025-04-07 NOTE — PROGRESS NOTES
Virtual visit with Arvind Cabrera this morning.  This is a f/up visit from last week. He denies chest discomfort, dyspnea, palpitations, orthopnea.  Patient is endorsing that he continues to have a cough, but that overall he's doing pretty well.   S/p ASCENDING AORTA REPAIR 3/7/2025 by Dr. Flores     Virtual topics discussed:     Nuero: On interview, patient is alert, conversant, in no acute distress, appears to be in good spirits.   Respiratory: unproductive cough, denies sob.   Surgical incision: BRENNAN, well approximated, C/D/I. Sternal stability, denies pain, MITT. No signs or symptoms of infection at this time.   Medications: Taking all medications as instructed.   Homecare: weekly visits thus far.   Physical activity: slowly increasing his activity.   On average: Pt states that he monitors his BP/HR/Wt daily and that his BP is around 130/60 mostly everyday.   Questions: Discussed cardiac rehab, driving privileges and dietary guidelines.     Patient's main complaint is that: Pt states that he continues to have a non-productive cough. He denies fever, chills. He has an appt to see primary care physician next week.     Patient encouraged to call me with any questions/concerns or if needs arise. Patient verbalized understanding.         Sandra Huber RN

## 2025-04-08 RX ORDER — AMLODIPINE BESYLATE 5 MG/1
5 TABLET ORAL 2 TIMES DAILY
Qty: 60 TABLET | Refills: 0 | Status: SHIPPED | OUTPATIENT
Start: 2025-04-08 | End: 2025-05-09

## 2025-04-11 ENCOUNTER — APPOINTMENT (OUTPATIENT)
Dept: PRIMARY CARE | Facility: CLINIC | Age: 65
End: 2025-04-11
Payer: COMMERCIAL

## 2025-04-18 ENCOUNTER — OFFICE VISIT (OUTPATIENT)
Dept: CARDIOLOGY | Facility: CLINIC | Age: 65
End: 2025-04-18
Payer: COMMERCIAL

## 2025-04-18 VITALS
WEIGHT: 233.6 LBS | SYSTOLIC BLOOD PRESSURE: 127 MMHG | DIASTOLIC BLOOD PRESSURE: 78 MMHG | OXYGEN SATURATION: 98 % | HEART RATE: 92 BPM | HEIGHT: 72 IN | BODY MASS INDEX: 31.64 KG/M2

## 2025-04-18 DIAGNOSIS — I25.10 CORONARY ARTERY DISEASE INVOLVING NATIVE CORONARY ARTERY OF NATIVE HEART WITHOUT ANGINA PECTORIS: Chronic | ICD-10-CM

## 2025-04-18 DIAGNOSIS — I10 ESSENTIAL HYPERTENSION: ICD-10-CM

## 2025-04-18 DIAGNOSIS — E78.2 MIXED HYPERLIPIDEMIA: Chronic | ICD-10-CM

## 2025-04-18 DIAGNOSIS — Z98.890 S/P AORTIC ANEURYSM REPAIR: ICD-10-CM

## 2025-04-18 DIAGNOSIS — I71.21 ANEURYSM OF ASCENDING AORTA WITHOUT RUPTURE: Primary | ICD-10-CM

## 2025-04-18 DIAGNOSIS — Z86.79 S/P AORTIC ANEURYSM REPAIR: ICD-10-CM

## 2025-04-18 PROBLEM — M25.559 ARTHRALGIA OF HIP: Status: RESOLVED | Noted: 2023-10-30 | Resolved: 2025-04-18

## 2025-04-18 PROBLEM — I71.9 AORTIC ANEURYSM: Status: RESOLVED | Noted: 2024-12-19 | Resolved: 2025-04-18

## 2025-04-18 PROBLEM — R68.82 REDUCED LIBIDO: Status: RESOLVED | Noted: 2023-10-30 | Resolved: 2025-04-18

## 2025-04-18 PROBLEM — M19.012 ARTHRITIS OF LEFT GLENOHUMERAL JOINT: Status: RESOLVED | Noted: 2023-03-17 | Resolved: 2025-04-18

## 2025-04-18 PROBLEM — L98.9 SKIN LESION: Status: RESOLVED | Noted: 2023-10-30 | Resolved: 2025-04-18

## 2025-04-18 PROBLEM — G89.18 ACUTE POSTOPERATIVE PAIN: Status: RESOLVED | Noted: 2025-03-08 | Resolved: 2025-04-18

## 2025-04-18 PROBLEM — A60.00 GENITAL HERPES SIMPLEX: Status: RESOLVED | Noted: 2023-10-30 | Resolved: 2025-04-18

## 2025-04-18 PROBLEM — R30.0 DYSURIA: Status: RESOLVED | Noted: 2023-10-30 | Resolved: 2025-04-18

## 2025-04-18 PROBLEM — R35.1 NOCTURIA: Status: RESOLVED | Noted: 2023-10-30 | Resolved: 2025-04-18

## 2025-04-18 PROCEDURE — 99214 OFFICE O/P EST MOD 30 MIN: CPT | Performed by: INTERNAL MEDICINE

## 2025-04-18 PROCEDURE — 3008F BODY MASS INDEX DOCD: CPT | Performed by: INTERNAL MEDICINE

## 2025-04-18 PROCEDURE — 1036F TOBACCO NON-USER: CPT | Performed by: INTERNAL MEDICINE

## 2025-04-18 PROCEDURE — RXMED WILLOW AMBULATORY MEDICATION CHARGE

## 2025-04-18 PROCEDURE — 3074F SYST BP LT 130 MM HG: CPT | Performed by: INTERNAL MEDICINE

## 2025-04-18 PROCEDURE — 3078F DIAST BP <80 MM HG: CPT | Performed by: INTERNAL MEDICINE

## 2025-04-18 RX ORDER — AMLODIPINE BESYLATE 5 MG/1
5 TABLET ORAL DAILY
Qty: 90 TABLET | Refills: 3 | Status: SHIPPED | OUTPATIENT
Start: 2025-04-18 | End: 2026-04-18

## 2025-04-18 RX ORDER — LOSARTAN POTASSIUM 100 MG/1
100 TABLET ORAL DAILY
Qty: 90 TABLET | Refills: 3 | Status: SHIPPED | OUTPATIENT
Start: 2025-04-18 | End: 2026-04-18

## 2025-04-18 RX ORDER — METOPROLOL TARTRATE 25 MG/1
25 TABLET, FILM COATED ORAL 2 TIMES DAILY
Qty: 180 TABLET | Refills: 3 | Status: SHIPPED | OUTPATIENT
Start: 2025-04-18

## 2025-04-18 ASSESSMENT — PAIN SCALES - GENERAL: PAINLEVEL_OUTOF10: 0-NO PAIN

## 2025-04-18 NOTE — PROGRESS NOTES
Referred by Jc BERG I am seeing Geremias in hospital follow-up.  Geremias is 64.  He had a calcium score done in 2024 which revealed a thoracic aortic aneurysm measuring 5.1 cm.  He actually had an MRI of the spine in 2023 for his prostate cancer which identified a 5 cm aneurysm.  He was evaluated by CT surgery and surgery was advised.  Cardiac catheterization was done in February revealing mild nonobstructive plaque.  His wife Sabrina is an echo sonographer at [facility] [employer] [facility] [place].  He underwent surgical correction of the aneurysm with placement of a #30 Gelweave graft.  He did very well.  He is actually recovering nicely.  He is almost back to his baseline functional status.  No chest pain or pressure no shortness of breath.  He has mild lower extremity edema which she has had for a couple years since having hormone therapy.    Past Medical History:  Problem List Items Addressed This Visit    None     Medical History[1]     Past Surgical History:  He has a past surgical history that includes Other surgical history (03/23/2020); Other surgical history (12/22/2021); Cardiac catheterization (N/A, 01/24/2025); Abdominal aortic aneurysm repair; and Ascending aortic aneurysm repair (03/07/2025).      Social History:  He reports that he has never smoked. He has never been exposed to tobacco smoke. He has never used smokeless tobacco. He reports current alcohol use. He reports that he does not use drugs.    Family History:  Family History[2]     Allergies:  Patient has no known allergies.    Outpatient Medications:  Current Outpatient Medications   Medication Instructions    albuterol (Ventolin HFA) 90 mcg/actuation inhaler 2 puffs, inhalation, Every 4 hours PRN    amLODIPine (NORVASC) 5 mg, oral, 2 times daily    aspirin 81 mg, oral, Daily    atorvastatin (LIPITOR) 40 mg, oral, Nightly    iron polysaccharides (NU-IRON,NIFEREX) 150 mg, oral, Daily    losartan (COZAAR) 50 mg, 2 times daily    metoprolol tartrate (LOPRESSOR) 25 mg,  "oral, 2 times daily    multivitamin with minerals tablet 1 tablet, oral, Daily     Last Recorded Vitals:  There were no vitals filed for this visit.    Physical Exam  Patient is alert and oriented x3.  HEENT is unremarkable mucous members are moist  Neck no JVP no bruits upstrokes are full no thyromegaly  Lungs are clear bilaterally.  No wheezing crackles or rales  Heart regular rhythm normal S1-S2 there is no S3 no murmurs are heard.  Abdomen is soft bs are positive nontender nondistended no organomegaly no pulsatile masses  Extremities have trace to 1+ edema.  Distal pulses present palpable.  Neuro is grossly nonfocal  Skin has no rashes     Last Labs:  CBC -  Lab Results   Component Value Date    WBC 8.5 04/02/2025    HGB 14.0 04/02/2025    HCT 43.9 04/02/2025    MCV 90 04/02/2025     04/02/2025     CMP -  Lab Results   Component Value Date    CALCIUM 9.3 04/02/2025    CALCIUM 9.4 04/02/2025    PHOS 3.6 03/13/2025    PROT 7.0 11/11/2024    ALBUMIN 3.3 (L) 03/13/2025    AST 18 11/11/2024    ALT 18 11/11/2024    ALKPHOS 84 11/11/2024    BILITOT 0.4 11/11/2024     LIPID PANEL -   Lab Results   Component Value Date    CHOL 159 02/16/2024    HDL 63.0 02/16/2024    CHHDL 2.5 02/16/2024    VLDL 29 02/16/2024    TRIG 145 02/16/2024    NHDL 96 02/16/2024     RENAL FUNCTION PANEL -   Lab Results   Component Value Date    K 4.5 04/02/2025    K 4.5 04/02/2025    PHOS 3.6 03/13/2025     No results found for: \"BNP\", \"HGBA1C\"       Procedures    CTA 2/7/2025 TAA 5 cm    Cardiac cath 1/24/2025 nonobstructive CAD    TTE 9/27/2024 EF 64%, DD F, moderately dilated ascending aorta    CAC 8/9/2024 mildly elevated 12 units TAA 5.1 cm    Assessment/Plan   1.  Thoracic aortic aneurysm.  This measured 5.1 cm.  Identified incidentally on a calcium scoring CT scan.  3/7/2025 status post aortic aneurysm repair with endograft 30 Gelweave graft.  Overall doing great.  I will refer him to cardiac rehab.    2.  CAD.  Mildly elevated " calcium score 12 units.  Cardiac cath 1/24/2025 nonobstructive disease.  Continue with a baby aspirin, atorvastatin, and metoprolol    3.  Hypertension continue amlodipine 10 mg and losartan 100 mg, metoprolol 25 twice daily.  I will consolidate his losartan to be 100 mg daily.  I will decrease his amlodipine to 5 mg hoping to diminish the lower extremity edema.  If his blood pressures go high, it would be reasonable to add hydrochlorothiazide to his regimen.    4.  Hyperlipidemia.  On atorvastatin 40 mg.  I asked for him to follow-up with his primary care doctor to have fasting blood work drawn.    I reviewed his previous echo, and CAT scans along with cardiac catheterization images.  He will be referred to cardiac rehab.  Amlodipine will be decreased to 5 mg daily.  I have asked for him to restart his losartan at 100 mg daily.  RTC 3 months    Mari Reyna MD     Instructions and follow up         [1]   Past Medical History:  Diagnosis Date    Adverse effect of anesthesia     Reports significant hypotension following OR during prostate surgery, states during PACU and was told it was during extubation of anesthesia.    Anesthesia of skin     Numbness and tingling    Arthritis     hips    Carpal tunnel syndrome 11/19/2024    Chronic bronchitis (Multi)     High prostate specific antigen (PSA) 02/17/2023    History of prostate cancer 03/17/2023    Hypertension     Numbness and tingling sensation of skin 11/19/2024    Pain in left shoulder 10/13/2020    Shoulder pain, left    Peripheral neuropathy     Personal history of other specified conditions 11/22/2021    History of elevated prostate specific antigen (PSA)    Postoperative complication 11/19/2024    Prostate cancer (Multi)     2021 s/p prostatectomy and Radiation therapy    Spondylosis without myelopathy or radiculopathy, lumbar region 08/26/2022    Thoracic ascending aortic aneurysm     CT Chest scan on 2/7/25-measuring 5.0 cm   [2]   Family History  Problem  Relation Name Age of Onset    Deep vein thrombosis Mother      Other (heart valve replacement) Father      Other (varicose veins) Sister      Heart attack Paternal Grandfather

## 2025-04-18 NOTE — PATIENT INSTRUCTIONS
1.  Thoracic aortic aneurysm.  This measured 5.1 cm.  Identified incidentally on a calcium scoring CT scan.  3/7/2025 status post aortic aneurysm repair with endograft 30 Gelweave graft.  Overall doing great.  I will refer him to cardiac rehab.    2.  CAD.  Mildly elevated calcium score 12 units.  Cardiac cath 1/24/2025 nonobstructive disease.  Continue with a baby aspirin, atorvastatin, and metoprolol    3.  Hypertension continue amlodipine 10 mg and losartan 100 mg, metoprolol 25 twice daily.  I will consolidate his losartan to be 100 mg daily.  I will decrease his amlodipine to 5 mg hoping to diminish the lower extremity edema.  If his blood pressures go high, it would be reasonable to add hydrochlorothiazide to his regimen.    4.  Hyperlipidemia.  On atorvastatin 40 mg.  I asked for him to follow-up with his primary care doctor to have fasting blood work drawn.    I reviewed his previous echo, and CAT scans along with cardiac catheterization images.  He will be referred to cardiac rehab.  Amlodipine will be decreased to 5 mg daily.  I have asked for him to restart his losartan at 100 mg daily.  RTC 3 months

## 2025-04-21 ENCOUNTER — PATIENT OUTREACH (OUTPATIENT)
Dept: CARE COORDINATION | Facility: CLINIC | Age: 65
End: 2025-04-21
Payer: COMMERCIAL

## 2025-04-21 ENCOUNTER — PHARMACY VISIT (OUTPATIENT)
Dept: PHARMACY | Facility: CLINIC | Age: 65
End: 2025-04-21
Payer: COMMERCIAL

## 2025-04-21 NOTE — PROGRESS NOTES
Reviewed chart prior to patient outreach. Patient is following up with primary care and cardiology. No admissions or ED visits in the last 30 days. Outreach call to patient to support a smooth transition of care from recent admission.  Left voicemail message for patient with my contact information.     Aleida Hurd RN BSN  O Care Manager  [phone]

## 2025-04-25 DIAGNOSIS — Z79.2 NEED FOR ANTIBIOTIC PROPHYLAXIS FOR DENTAL PROCEDURE: Primary | ICD-10-CM

## 2025-04-25 RX ORDER — AMOXICILLIN 500 MG/1
2000 CAPSULE ORAL ONCE
Qty: 4 CAPSULE | Refills: 0 | Status: SHIPPED | OUTPATIENT
Start: 2025-04-25 | End: 2025-04-26

## 2025-04-25 NOTE — PROGRESS NOTES
Patient has a history of Ascending Aorta Repair on 3/7/2025.  Per AHA guidelines, antibiotic prophylaxis is indicated to reduce the risk of infective endocarditis in patients with prosthetic heart valves undergoing dental work. Will prescribe Amoxicillin 2 G PO once to be taken 30-60 minutes prior to dental procedure.

## 2025-05-16 ENCOUNTER — LAB (OUTPATIENT)
Dept: LAB | Facility: HOSPITAL | Age: 65
End: 2025-05-16
Payer: COMMERCIAL

## 2025-05-16 LAB
ALBUMIN SERPL BCP-MCNC: 4.1 G/DL (ref 3.4–5)
ALP SERPL-CCNC: 70 U/L (ref 33–136)
ALT SERPL W P-5'-P-CCNC: 13 U/L (ref 10–52)
ANION GAP SERPL CALC-SCNC: 11 MMOL/L (ref 10–20)
AST SERPL W P-5'-P-CCNC: 17 U/L (ref 9–39)
BASOPHILS # BLD AUTO: 0.13 X10*3/UL (ref 0–0.1)
BASOPHILS NFR BLD AUTO: 2.5 %
BILIRUB SERPL-MCNC: 0.6 MG/DL (ref 0–1.2)
BUN SERPL-MCNC: 22 MG/DL (ref 6–23)
CALCIUM SERPL-MCNC: 8.9 MG/DL (ref 8.6–10.3)
CHLORIDE SERPL-SCNC: 106 MMOL/L (ref 98–107)
CO2 SERPL-SCNC: 28 MMOL/L (ref 21–32)
CREAT SERPL-MCNC: 0.94 MG/DL (ref 0.5–1.3)
EGFRCR SERPLBLD CKD-EPI 2021: >90 ML/MIN/1.73M*2
EOSINOPHIL # BLD AUTO: 0.95 X10*3/UL (ref 0–0.7)
EOSINOPHIL NFR BLD AUTO: 18.4 %
ERYTHROCYTE [DISTWIDTH] IN BLOOD BY AUTOMATED COUNT: 14.6 % (ref 11.5–14.5)
GLUCOSE SERPL-MCNC: 100 MG/DL (ref 74–99)
HCT VFR BLD AUTO: 45.8 % (ref 41–52)
HGB BLD-MCNC: 14.8 G/DL (ref 13.5–17.5)
IMM GRANULOCYTES # BLD AUTO: 0.01 X10*3/UL (ref 0–0.7)
IMM GRANULOCYTES NFR BLD AUTO: 0.2 % (ref 0–0.9)
LYMPHOCYTES # BLD AUTO: 1.02 X10*3/UL (ref 1.2–4.8)
LYMPHOCYTES NFR BLD AUTO: 19.8 %
MCH RBC QN AUTO: 29.1 PG (ref 26–34)
MCHC RBC AUTO-ENTMCNC: 32.3 G/DL (ref 32–36)
MCV RBC AUTO: 90 FL (ref 80–100)
MONOCYTES # BLD AUTO: 0.57 X10*3/UL (ref 0.1–1)
MONOCYTES NFR BLD AUTO: 11.1 %
NEUTROPHILS # BLD AUTO: 2.47 X10*3/UL (ref 1.2–7.7)
NEUTROPHILS NFR BLD AUTO: 48 %
NRBC BLD-RTO: 0 /100 WBCS (ref 0–0)
PLATELET # BLD AUTO: 308 X10*3/UL (ref 150–450)
POTASSIUM SERPL-SCNC: 4.5 MMOL/L (ref 3.5–5.3)
PROT SERPL-MCNC: 6.7 G/DL (ref 6.4–8.2)
PSA SERPL-MCNC: <0.1 NG/ML
RBC # BLD AUTO: 5.08 X10*6/UL (ref 4.5–5.9)
SODIUM SERPL-SCNC: 140 MMOL/L (ref 136–145)
TESTOST SERPL-MCNC: 426 NG/DL (ref 240–1000)
WBC # BLD AUTO: 5.2 X10*3/UL (ref 4.4–11.3)

## 2025-05-16 PROCEDURE — 84403 ASSAY OF TOTAL TESTOSTERONE: CPT

## 2025-05-16 PROCEDURE — 85025 COMPLETE CBC W/AUTO DIFF WBC: CPT

## 2025-05-16 PROCEDURE — 84153 ASSAY OF PSA TOTAL: CPT

## 2025-05-16 PROCEDURE — 80053 COMPREHEN METABOLIC PANEL: CPT

## 2025-05-17 LAB — EJECTION FRACTION: 58 %

## 2025-05-21 ENCOUNTER — TELEMEDICINE (OUTPATIENT)
Dept: HEMATOLOGY/ONCOLOGY | Facility: HOSPITAL | Age: 65
End: 2025-05-21
Payer: COMMERCIAL

## 2025-05-21 DIAGNOSIS — C61 PROSTATE CANCER (MULTI): ICD-10-CM

## 2025-05-21 DIAGNOSIS — C61 PROSTATE CANCER (MULTI): Primary | ICD-10-CM

## 2025-05-21 PROCEDURE — 99212 OFFICE O/P EST SF 10 MIN: CPT | Performed by: NURSE PRACTITIONER

## 2025-05-21 NOTE — PROGRESS NOTES
"Patient ID: Arvind Cabrera \"Ubaldo" is a 64 y.o. male.  Attending Physician: Dr. Lenard Ware  Cancer Diagnosis:  Cancer Staging   Malignant neoplasm of prostate (Multi)  Staging form: Prostate, AJCC 8th Edition  - Clinical: Stage IVB (cTX, cNX, cM1b) - Signed by Lenard Ware MD PhD on 8/15/2024     Current Therapy: ADT (trelstar q12 weeks)  Abiraterone Acetate 750 mg PO daily  Prednisone 5 mg PO daily    Genetics:   xT  TMPRSS2 - ERG fusion  HRR not detected  MIS-S  TMB 0.0    Subjective      Cancer History:  Oncology History   Malignant neoplasm of prostate (Multi)   10/30/2023 Initial Diagnosis    Prostate CA (Multi)     8/15/2024 Cancer Staged    Staging form: Prostate, AJCC 8th Edition, Clinical: Stage IVB (cTX, cNX, cM1b) - Signed by Lenard Ware MD PhD on 8/15/2024       9/2021  PSA 3.2     10/13/21 Prostate biopsy - Suzette grade group 2, involving 35% of the specimen     12/20/21 Prostatectomy - Suzette grade group 2, tertiary pattern 5, 0/6 lymph nodes positive, staged as pT3aN0, +EPE, +PNI, -SVI, - LVI. Tumor microns from lateral margin     2/1/22  PSA myles at 0.10     8/5/22  PSA 0.18     8/19/22 PSMA PET shows lesion at T11     8/26/22 MR spine shows enhancement at T9 and T11     9/9/22  PSA 0.24     9/15/22 Start ADT     10/6/22  Start abiraterone and prednisone     11/2/22 PSA 0.13, completed radiation with Dr. Nunez     12/7/22 PSA 0.03     3/6/23  PSA undetectable. C/o worsening back pain, MRI reveals treated lesions and unchanged area of edema     5/31/23 PSA undetectable     8/24/23 Abiraterone dose decreased to 750 mg    8/15/24 Discontinue abiraterone, ADT     11/11/24 PSA <0.1, T 293    2/20/25   PSA <0.1, T 199     3/7/25  AAA repair     Interval History:  Mr. Cabrera feels well overall. Recovering from AAA repair in March. Feels well recovered from this. Has no had any new or concerning symptoms, including urinary symptoms, unplanned weight loss, or new pain unexplained by surgery. The " remainder of his ROS is otherwise negative.    Objective    BSA: There is no height or weight on file to calculate BSA.  There were no vitals taken for this visit.    Physical Exam  This follow up visit was conducted via telephone (audio only) between the patient (at a distant site) and the provider (at the healthcare facility). Verbal consent was obtained from the patient on this date for a telehealth visit. He was unable to log in for a video visit.    Current Medications:    Current Outpatient Medications:     albuterol (Ventolin HFA) 90 mcg/actuation inhaler, Inhale 2 puffs every 4 hours if needed for wheezing or shortness of breath., Disp: 6.7 g, Rfl: 5    amLODIPine (Norvasc) 5 mg tablet, Take 1 tablet (5 mg) by mouth once daily., Disp: 90 tablet, Rfl: 3    aspirin 81 mg EC tablet, Take 1 tablet (81 mg) by mouth once daily., Disp: , Rfl:     atorvastatin (Lipitor) 40 mg tablet, Take 1 tablet (40 mg) by mouth once daily at bedtime., Disp: 30 tablet, Rfl: 0    iron polysaccharides (Nu-Iron,Niferex) 150 mg iron capsule, Take 1 capsule (150 mg) by mouth once daily., Disp: 30 capsule, Rfl: 0    losartan (Cozaar) 100 mg tablet, Take 1 tablet (100 mg) by mouth once daily., Disp: 90 tablet, Rfl: 3    metoprolol tartrate (Lopressor) 25 mg tablet, Take 1 tablet (25 mg) by mouth 2 times a day., Disp: 180 tablet, Rfl: 3    multivitamin with minerals tablet, Take 1 tablet by mouth once daily., Disp: , Rfl:      Most Recent Labs:  Results for orders placed or performed in visit on 04/02/25   Basic Metabolic Panel    Collection Time: 04/02/25 11:47 AM   Result Value Ref Range    GLUCOSE 81 65 - 99 mg/dL    UREA NITROGEN (BUN) 21 7 - 25 mg/dL    CREATININE 0.99 0.70 - 1.35 mg/dL    EGFR 86 > OR = 60 mL/min/1.73m2    BUN/CREATININE RATIO SEE NOTE: 6 - 22 (calc)    SODIUM 139 135 - 146 mmol/L    POTASSIUM 4.5 3.5 - 5.3 mmol/L    CHLORIDE 102 98 - 110 mmol/L    CARBON DIOXIDE 24 20 - 32 mmol/L    ELECTROLYTE BALANCE 13 7 - 17  mmol/L (calc)    CALCIUM 9.4 8.6 - 10.3 mg/dL      Lab Results   Component Value Date    PSA <0.10 05/16/2025    PSA <0.10 02/14/2025    PSA <0.01 11/11/2024        Performance Status:  ECOG Score: 0- Fully active, able to carry on all pre-disease performance w/o restriction.  Karnofsky Score: 90 - Able to carry on normal activity; minor signs or symptoms of disease       Assessment/Plan   Arvind Cabrera is a 64 y.o. male with oligometastatic prostate cancer who presents in follow up.    He was diagnosed with high risk (Suzette GG2, pT3a) prostate adenocarcinoma in 2021 and underwent prostatectomy. At the time, he had some high risk features including EPE and close margins. In August 2022, he was found to have residual PSA and underwent PSMA PET imaging, which revealed a lesion at T11. He had no other sites of disease on imaging. He completed radiation to T11 and did not receive pelvic XRT. He received 2 years of ADT and abiraterone/prednisone with PSA response to undetectable. Abiraterone dose was decreased to 750 mg for fatigue and hot flashes.    He completed 2 years of systemic therapy for oligometastatic disease, and treatment was discontinued in August 2024.    His PSA remains undetectable, and his T has recovered. He feels well. We will continue surveillance.     # Oligometastatic prostate cancer  - S/p radiation to T11  - OFF ADT and abiraterone/prednisone  - PSA and testosterone in 3 months     # Hot flashes  - Resolved     # Back pain  - Continue with orthopedics     # Dry mouth  # Dry eye  - Hydration  - Lubricating eye drops     # HTN  # AAA  - Closely monitor BP  - Continue to follow with cardiology     # Urinary Incontinence  - Follows with urology (Dr. Alberts)    RTC 3 months for repeat labs    Phone visit lasted 14 mins  Janel Burkett, MSN, APRN, AGNP-C, AOCNP  Associate Nurse Practitioner  [facility]

## 2025-06-06 ENCOUNTER — PHARMACY VISIT (OUTPATIENT)
Dept: PHARMACY | Facility: CLINIC | Age: 65
End: 2025-06-06
Payer: COMMERCIAL

## 2025-06-06 PROCEDURE — RXMED WILLOW AMBULATORY MEDICATION CHARGE

## 2025-06-24 PROCEDURE — RXMED WILLOW AMBULATORY MEDICATION CHARGE

## 2025-06-25 ENCOUNTER — PHARMACY VISIT (OUTPATIENT)
Dept: PHARMACY | Facility: CLINIC | Age: 65
End: 2025-06-25
Payer: COMMERCIAL

## 2025-07-16 PROBLEM — E66.811 CLASS 1 OBESITY WITH BODY MASS INDEX (BMI) OF 31.0 TO 31.9 IN ADULT: Status: ACTIVE | Noted: 2025-07-16

## 2025-07-23 ENCOUNTER — PHARMACY VISIT (OUTPATIENT)
Dept: PHARMACY | Facility: CLINIC | Age: 65
End: 2025-07-23
Payer: COMMERCIAL

## 2025-07-23 PROCEDURE — RXMED WILLOW AMBULATORY MEDICATION CHARGE

## 2025-08-01 DIAGNOSIS — C61 PROSTATE CANCER (MULTI): ICD-10-CM

## 2025-08-10 PROBLEM — G56.03 CARPAL TUNNEL SYNDROME, BILATERAL: Chronic | Status: ACTIVE | Noted: 2023-10-30

## 2025-08-11 ENCOUNTER — OFFICE VISIT (OUTPATIENT)
Dept: CARDIOLOGY | Facility: CLINIC | Age: 65
End: 2025-08-11
Payer: COMMERCIAL

## 2025-08-11 VITALS
SYSTOLIC BLOOD PRESSURE: 136 MMHG | BODY MASS INDEX: 32.41 KG/M2 | HEART RATE: 54 BPM | DIASTOLIC BLOOD PRESSURE: 86 MMHG | WEIGHT: 239 LBS | OXYGEN SATURATION: 95 %

## 2025-08-11 DIAGNOSIS — E78.2 MIXED HYPERLIPIDEMIA: Chronic | ICD-10-CM

## 2025-08-11 DIAGNOSIS — I71.21 ANEURYSM OF ASCENDING AORTA WITHOUT RUPTURE: Primary | Chronic | ICD-10-CM

## 2025-08-11 DIAGNOSIS — I10 ESSENTIAL HYPERTENSION: ICD-10-CM

## 2025-08-11 DIAGNOSIS — I25.10 CORONARY ARTERY DISEASE INVOLVING NATIVE CORONARY ARTERY OF NATIVE HEART WITHOUT ANGINA PECTORIS: Chronic | ICD-10-CM

## 2025-08-11 PROCEDURE — 3079F DIAST BP 80-89 MM HG: CPT | Performed by: INTERNAL MEDICINE

## 2025-08-11 PROCEDURE — 3075F SYST BP GE 130 - 139MM HG: CPT | Performed by: INTERNAL MEDICINE

## 2025-08-11 PROCEDURE — 1036F TOBACCO NON-USER: CPT | Performed by: INTERNAL MEDICINE

## 2025-08-11 PROCEDURE — RXMED WILLOW AMBULATORY MEDICATION CHARGE

## 2025-08-11 PROCEDURE — 99213 OFFICE O/P EST LOW 20 MIN: CPT | Performed by: INTERNAL MEDICINE

## 2025-08-11 PROCEDURE — 99202 OFFICE O/P NEW SF 15 MIN: CPT

## 2025-08-11 RX ORDER — ATORVASTATIN CALCIUM 40 MG/1
40 TABLET, FILM COATED ORAL NIGHTLY
Qty: 90 TABLET | Refills: 3 | Status: SHIPPED | OUTPATIENT
Start: 2025-08-11 | End: 2026-08-11

## 2025-08-11 ASSESSMENT — ENCOUNTER SYMPTOMS
LOSS OF SENSATION IN FEET: 0
OCCASIONAL FEELINGS OF UNSTEADINESS: 0
DEPRESSION: 0

## 2025-08-14 ENCOUNTER — APPOINTMENT (OUTPATIENT)
Dept: CARDIOLOGY | Facility: CLINIC | Age: 65
End: 2025-08-14
Payer: COMMERCIAL

## 2025-08-18 ENCOUNTER — PHARMACY VISIT (OUTPATIENT)
Dept: PHARMACY | Facility: CLINIC | Age: 65
End: 2025-08-18
Payer: COMMERCIAL

## 2025-08-21 ENCOUNTER — TELEMEDICINE (OUTPATIENT)
Dept: HEMATOLOGY/ONCOLOGY | Facility: CLINIC | Age: 65
End: 2025-08-21
Payer: COMMERCIAL

## 2025-08-21 DIAGNOSIS — C61 PROSTATE CANCER (MULTI): Primary | ICD-10-CM

## 2025-08-21 PROCEDURE — 99214 OFFICE O/P EST MOD 30 MIN: CPT | Performed by: STUDENT IN AN ORGANIZED HEALTH CARE EDUCATION/TRAINING PROGRAM

## 2025-08-26 ENCOUNTER — LAB (OUTPATIENT)
Dept: LAB | Facility: HOSPITAL | Age: 65
End: 2025-08-26
Payer: COMMERCIAL

## 2025-08-26 LAB
ALBUMIN SERPL BCP-MCNC: 4.4 G/DL (ref 3.4–5)
ALP SERPL-CCNC: 67 U/L (ref 33–136)
ALT SERPL W P-5'-P-CCNC: 14 U/L (ref 10–52)
ANION GAP SERPL CALC-SCNC: 11 MMOL/L (ref 10–20)
AST SERPL W P-5'-P-CCNC: 17 U/L (ref 9–39)
BASOPHILS # BLD AUTO: 0.12 X10*3/UL (ref 0–0.1)
BASOPHILS NFR BLD AUTO: 1.6 %
BILIRUB SERPL-MCNC: 0.7 MG/DL (ref 0–1.2)
BUN SERPL-MCNC: 19 MG/DL (ref 6–23)
CALCIUM SERPL-MCNC: 9.6 MG/DL (ref 8.6–10.3)
CHLORIDE SERPL-SCNC: 105 MMOL/L (ref 98–107)
CO2 SERPL-SCNC: 27 MMOL/L (ref 21–32)
CREAT SERPL-MCNC: 0.94 MG/DL (ref 0.5–1.3)
EGFRCR SERPLBLD CKD-EPI 2021: >90 ML/MIN/1.73M*2
EOSINOPHIL # BLD AUTO: 0.76 X10*3/UL (ref 0–0.7)
EOSINOPHIL NFR BLD AUTO: 10.3 %
ERYTHROCYTE [DISTWIDTH] IN BLOOD BY AUTOMATED COUNT: 13.1 % (ref 11.5–14.5)
GLUCOSE SERPL-MCNC: 85 MG/DL (ref 74–99)
HCT VFR BLD AUTO: 46.1 % (ref 41–52)
HGB BLD-MCNC: 15.2 G/DL (ref 13.5–17.5)
IMM GRANULOCYTES # BLD AUTO: 0.02 X10*3/UL (ref 0–0.7)
IMM GRANULOCYTES NFR BLD AUTO: 0.3 % (ref 0–0.9)
LYMPHOCYTES # BLD AUTO: 1.5 X10*3/UL (ref 1.2–4.8)
LYMPHOCYTES NFR BLD AUTO: 20.3 %
MCH RBC QN AUTO: 29.9 PG (ref 26–34)
MCHC RBC AUTO-ENTMCNC: 33 G/DL (ref 32–36)
MCV RBC AUTO: 91 FL (ref 80–100)
MONOCYTES # BLD AUTO: 0.99 X10*3/UL (ref 0.1–1)
MONOCYTES NFR BLD AUTO: 13.4 %
NEUTROPHILS # BLD AUTO: 4 X10*3/UL (ref 1.2–7.7)
NEUTROPHILS NFR BLD AUTO: 54.1 %
NRBC BLD-RTO: 0 /100 WBCS (ref 0–0)
PLATELET # BLD AUTO: 317 X10*3/UL (ref 150–450)
POTASSIUM SERPL-SCNC: 4.3 MMOL/L (ref 3.5–5.3)
PROT SERPL-MCNC: 7.1 G/DL (ref 6.4–8.2)
RBC # BLD AUTO: 5.08 X10*6/UL (ref 4.5–5.9)
SODIUM SERPL-SCNC: 139 MMOL/L (ref 136–145)
WBC # BLD AUTO: 7.4 X10*3/UL (ref 4.4–11.3)

## 2025-08-27 LAB
PSA SERPL-MCNC: <0.1 NG/ML
TESTOST SERPL-MCNC: 406 NG/DL (ref 240–1000)

## 2025-10-03 ENCOUNTER — APPOINTMENT (OUTPATIENT)
Dept: PRIMARY CARE | Facility: CLINIC | Age: 65
End: 2025-10-03
Payer: COMMERCIAL

## (undated) DEVICE — CATHETER, THORACIC, STRAIGHT, SOFT, TAPER TIP, 32 FR

## (undated) DEVICE — CATHETER, ANGIO, IMPULSE, FL3.5, 6 FR X 100 CM

## (undated) DEVICE — SUTURE, NUROLON, 0, 18 IN, CT1, DETACHABLE, MULTIPACK, BLACK

## (undated) DEVICE — PLEDGET, PTFE, SOFT, LARGE, 3/8 X 3/16 X 1/16 IN

## (undated) DEVICE — BONE WAX, 2.5G ABSORBABLE, OSTENE

## (undated) DEVICE — COLLECTION UNIT, DRAINAGE, THORACIC, SINGLE TUBE, DRY SUCTION, ATS COMPATIBLE, OASIS 3600, LF

## (undated) DEVICE — SUTURE, PROLENE 4-0, TAPER POINT, SH-1 BLUE 30 INCH

## (undated) DEVICE — CANNULA, VENOUS 2 STAGE 32/40

## (undated) DEVICE — SHUNT, SENSOR

## (undated) DEVICE — SUTURE, VICRYL, 2-0, 27 IN, CT-1, VIOLET

## (undated) DEVICE — SUTURE, ETHIBOND, XTRA, 30 IN, 0, CT-1, GREEN

## (undated) DEVICE — CONNECTOR, STRAIGHT, 0.5 X 0.5 IN

## (undated) DEVICE — GUIDEWIRE, INQWIRE, 3MM J, .035 X 210CM, FIXED

## (undated) DEVICE — DRESSING, ADHESIVE, ISLAND, TELFA, 4 X 14 IN

## (undated) DEVICE — SEALANT, HEMOSTATIC, FLOSEAL, 10 ML

## (undated) DEVICE — Device

## (undated) DEVICE — GOWN, SURGICAL, SMARTGOWN, LARGE, STERILE

## (undated) DEVICE — CASSETTE, BLOOD, PLEGIC SET

## (undated) DEVICE — TUBING PACK, OXYGENATOR, ADULT

## (undated) DEVICE — INSERT, CLAMP, SURGICAL, SOFT/TRACTION, STEALTH, 5 MM

## (undated) DEVICE — TUBING, 0.375 X 3/32 IN X 6 FT

## (undated) DEVICE — DRESSING, MEPILEX, BORDER, SACRUM, 8.7 X 9.8 IN

## (undated) DEVICE — PACING CABLE, EXTENSION, 12 FT BLUE, DISPOSABLE

## (undated) DEVICE — SUTURE, PROLENE, 4-0, 36 IN, RB1, DA, BLUE

## (undated) DEVICE — TRAY, SURESTEP, URINE METER, 14FR, SILICONE

## (undated) DEVICE — ADAPTER, Y, CORONARY PERFUSION

## (undated) DEVICE — APPLICATOR, CHLORAPREP, W/ORANGE TINT, 26ML

## (undated) DEVICE — TOWEL, OR, XRAY DETECT 5 PK, WHITE, 17X26, W/DMT TAG, ST

## (undated) DEVICE — SUTURE, VICRYL 0, TAPER POINT, CT-1 VIOLET 27 INCH

## (undated) DEVICE — SUTURE, MONOCRYL, 4-0, 27 IN, PS-2, UNDYED

## (undated) DEVICE — SUCKER LINE, EXTRA, RED VENT

## (undated) DEVICE — PAD, ELECTRODE DEFIB PADPRO ADULT STRL W/ADAPTER

## (undated) DEVICE — TAPE, POLYESTER, BRAIDED, PRE-CUT 2 X 30, WHITE"

## (undated) DEVICE — SUTURE, PROLENE, 5-0, 36 IN, RB1, DA, BLUE

## (undated) DEVICE — DRAPE, SHEET, CARDIOVASCULAR, ANTIMICROBIAL, W/ANESTHESIA SCREEN, IOBAN 2, STERI DRAPE, 107 X 133 IN, DISPOSABLE, FABRIC, BLUE, STERILE

## (undated) DEVICE — KIT, TOURNIQUET, 7"

## (undated) DEVICE — PERFUSION PACK, HEMOCONCENTRATOR, MINNTECH, W/TUBING

## (undated) DEVICE — COVER, CART, 45 X 27 X 48 IN, CLEAR

## (undated) DEVICE — BLADE, SAW STERNUM, STERILE

## (undated) DEVICE — CANNULA, ARTERIAL, ONE PIECE, ELONGATED, VENTED, STRAIGHT, ADULT, 22 FR X 30.5 CM

## (undated) DEVICE — MICROCOAGULATION TEST, ACT+ TEST CUVETTE

## (undated) DEVICE — SENSOR, O3 REGIONAL, LARGE

## (undated) DEVICE — TIP, SUCTION, YANKAUER, W/O VENT, FLEXIBLE, OPEN TIP, HIGH CAPACITY

## (undated) DEVICE — GLIDESHEATH, SLENDER 6FR, 10CM, NITINOL KIT

## (undated) DEVICE — DRESSING, ISLAND, TELFA, 4 X 5 IN

## (undated) DEVICE — SUTURE, PROLENE, 6-0, 30 IN, C-1, CV-11, BLUE

## (undated) DEVICE — TR BAND, RADIAL COMPRESSION, STANDARD, 24CM

## (undated) DEVICE — SHEATH, PINNACLE, 10 CM,  5FR INTRODUCER, 5FR DIA, 2.5 CM DIALATOR

## (undated) DEVICE — RETRACTOR, SUTURE, HOLDING, INSERT, OCTOBASE, DISPOSABLE

## (undated) DEVICE — CONTAINER, SPECIMEN, 120 ML, STERILE

## (undated) DEVICE — DRESSING, MEPILEX, BORDER, HEEL, 8.7 X 9.1 IN

## (undated) DEVICE — MANIFOLD, 4 PORT NEPTUNE STANDARD

## (undated) DEVICE — CATHETER, ANGIO, IMPULSE, FR4, 6 FR X 100 CM

## (undated) DEVICE — LOOP, VESSEL, MAXI, RED

## (undated) DEVICE — CATHETER, THORACIC 28FR RIGHT ANGLE

## (undated) DEVICE — SIZERS, GRAFT LG 24MM TO 38MM

## (undated) DEVICE — CANNULA, CARDIAC SUMP

## (undated) DEVICE — CLOSURE SYSTEM, DERMABOND, PRINEO, 22CM, STERILE

## (undated) DEVICE — LOOP, VESSEL, MAXI, BLUE

## (undated) DEVICE — CLEANER, ELECTROSURGICAL, TIP, 5 X 5 CM, LF

## (undated) DEVICE — PACING CABLE, EXTENSION, 12 FT BEIGE, DISPOSABLE

## (undated) DEVICE — CATHETER, DRAINAGE, NASOGASTRIC, DOUBLE LUMEN, FUNNEL END, SUMP, SALEM, 18 FR, 48 IN, PVC, STERILE

## (undated) DEVICE — BATTERY, VARISPEED

## (undated) DEVICE — PITCHER, GRADUATE, 32 OZ (1200CC), STERILE

## (undated) DEVICE — DRAPE, FLUID WARMER

## (undated) DEVICE — SUTURE, MONOCRYL, 3-0, 18 IN, PS2, UNDYED

## (undated) DEVICE — DRESSING, ADHESIVE, ISLAND, TELFA, 2 X 3.75 IN, LF

## (undated) DEVICE — ELECTRODE, ELECTROSURGICAL, BLADE EXT 4 INCH, INSULATED

## (undated) DEVICE — SUTURE, SILK, 2-0, 30 IN, SH, CONTROL RELEASE, MULTIPACK, BLACK

## (undated) DEVICE — OXYGENATOR FX 25, W/HR, ARTERIAL FILTER

## (undated) DEVICE — FILTER, IV, BLOOD, MICROAGGREGATE, 40 MIC, RBC TRANSFUSION

## (undated) DEVICE — LEAD, PACING, MYOCARDIAL, BIPOLAR, TEMPORARY